# Patient Record
Sex: FEMALE | Race: BLACK OR AFRICAN AMERICAN
[De-identification: names, ages, dates, MRNs, and addresses within clinical notes are randomized per-mention and may not be internally consistent; named-entity substitution may affect disease eponyms.]

---

## 2021-02-25 ENCOUNTER — APPOINTMENT (OUTPATIENT)
Dept: NEUROLOGY | Facility: CLINIC | Age: 76
End: 2021-02-25

## 2023-01-11 ENCOUNTER — INPATIENT (INPATIENT)
Facility: HOSPITAL | Age: 78
LOS: 5 days | Discharge: NURSING, REHAB OR LONG-TERM CARE | DRG: 41 | End: 2023-01-17
Attending: PSYCHIATRY & NEUROLOGY | Admitting: PSYCHIATRY & NEUROLOGY
Payer: MEDICARE

## 2023-01-11 VITALS
OXYGEN SATURATION: 96 % | RESPIRATION RATE: 20 BRPM | WEIGHT: 134.04 LBS | SYSTOLIC BLOOD PRESSURE: 146 MMHG | TEMPERATURE: 99 F | HEIGHT: 61 IN | DIASTOLIC BLOOD PRESSURE: 84 MMHG | HEART RATE: 75 BPM

## 2023-01-11 DIAGNOSIS — I63.9 CEREBRAL INFARCTION, UNSPECIFIED: ICD-10-CM

## 2023-01-11 LAB
ALBUMIN SERPL ELPH-MCNC: 4 G/DL — SIGNIFICANT CHANGE UP (ref 3.3–5)
ALP SERPL-CCNC: 83 U/L — SIGNIFICANT CHANGE UP (ref 40–120)
ALT FLD-CCNC: 36 U/L — SIGNIFICANT CHANGE UP (ref 10–45)
ANION GAP SERPL CALC-SCNC: 12 MMOL/L — SIGNIFICANT CHANGE UP (ref 5–17)
APPEARANCE UR: CLEAR — SIGNIFICANT CHANGE UP
AST SERPL-CCNC: 30 U/L — SIGNIFICANT CHANGE UP (ref 10–40)
BASOPHILS # BLD AUTO: 0.03 K/UL — SIGNIFICANT CHANGE UP (ref 0–0.2)
BASOPHILS NFR BLD AUTO: 0.5 % — SIGNIFICANT CHANGE UP (ref 0–2)
BILIRUB SERPL-MCNC: 0.5 MG/DL — SIGNIFICANT CHANGE UP (ref 0.2–1.2)
BILIRUB UR-MCNC: NEGATIVE — SIGNIFICANT CHANGE UP
BUN SERPL-MCNC: 11 MG/DL — SIGNIFICANT CHANGE UP (ref 7–23)
CALCIUM SERPL-MCNC: 10 MG/DL — SIGNIFICANT CHANGE UP (ref 8.4–10.5)
CHLORIDE SERPL-SCNC: 99 MMOL/L — SIGNIFICANT CHANGE UP (ref 96–108)
CO2 SERPL-SCNC: 27 MMOL/L — SIGNIFICANT CHANGE UP (ref 22–31)
COLOR SPEC: SIGNIFICANT CHANGE UP
CREAT SERPL-MCNC: 1.03 MG/DL — SIGNIFICANT CHANGE UP (ref 0.5–1.3)
DIFF PNL FLD: NEGATIVE — SIGNIFICANT CHANGE UP
EGFR: 56 ML/MIN/1.73M2 — LOW
EOSINOPHIL # BLD AUTO: 0.07 K/UL — SIGNIFICANT CHANGE UP (ref 0–0.5)
EOSINOPHIL NFR BLD AUTO: 1.2 % — SIGNIFICANT CHANGE UP (ref 0–6)
FLUAV AG NPH QL: SIGNIFICANT CHANGE UP
FLUBV AG NPH QL: SIGNIFICANT CHANGE UP
GAS PNL BLDV: SIGNIFICANT CHANGE UP
GLUCOSE SERPL-MCNC: 108 MG/DL — HIGH (ref 70–99)
GLUCOSE UR QL: NEGATIVE — SIGNIFICANT CHANGE UP
HCT VFR BLD CALC: 45.6 % — HIGH (ref 34.5–45)
HGB BLD-MCNC: 14.7 G/DL — SIGNIFICANT CHANGE UP (ref 11.5–15.5)
IMM GRANULOCYTES NFR BLD AUTO: 0.2 % — SIGNIFICANT CHANGE UP (ref 0–0.9)
KETONES UR-MCNC: NEGATIVE — SIGNIFICANT CHANGE UP
LEUKOCYTE ESTERASE UR-ACNC: ABNORMAL
LYMPHOCYTES # BLD AUTO: 2.23 K/UL — SIGNIFICANT CHANGE UP (ref 1–3.3)
LYMPHOCYTES # BLD AUTO: 39.3 % — SIGNIFICANT CHANGE UP (ref 13–44)
MCHC RBC-ENTMCNC: 28.8 PG — SIGNIFICANT CHANGE UP (ref 27–34)
MCHC RBC-ENTMCNC: 32.2 GM/DL — SIGNIFICANT CHANGE UP (ref 32–36)
MCV RBC AUTO: 89.4 FL — SIGNIFICANT CHANGE UP (ref 80–100)
MONOCYTES # BLD AUTO: 0.52 K/UL — SIGNIFICANT CHANGE UP (ref 0–0.9)
MONOCYTES NFR BLD AUTO: 9.2 % — SIGNIFICANT CHANGE UP (ref 2–14)
NEUTROPHILS # BLD AUTO: 2.82 K/UL — SIGNIFICANT CHANGE UP (ref 1.8–7.4)
NEUTROPHILS NFR BLD AUTO: 49.6 % — SIGNIFICANT CHANGE UP (ref 43–77)
NITRITE UR-MCNC: NEGATIVE — SIGNIFICANT CHANGE UP
NRBC # BLD: 0 /100 WBCS — SIGNIFICANT CHANGE UP (ref 0–0)
PH UR: 6 — SIGNIFICANT CHANGE UP (ref 5–8)
PLATELET # BLD AUTO: 278 K/UL — SIGNIFICANT CHANGE UP (ref 150–400)
POTASSIUM SERPL-MCNC: 4.1 MMOL/L — SIGNIFICANT CHANGE UP (ref 3.5–5.3)
POTASSIUM SERPL-SCNC: 4.1 MMOL/L — SIGNIFICANT CHANGE UP (ref 3.5–5.3)
PROT SERPL-MCNC: 8.2 G/DL — SIGNIFICANT CHANGE UP (ref 6–8.3)
PROT UR-MCNC: SIGNIFICANT CHANGE UP
RBC # BLD: 5.1 M/UL — SIGNIFICANT CHANGE UP (ref 3.8–5.2)
RBC # FLD: 11.9 % — SIGNIFICANT CHANGE UP (ref 10.3–14.5)
RSV RNA NPH QL NAA+NON-PROBE: SIGNIFICANT CHANGE UP
SARS-COV-2 RNA SPEC QL NAA+PROBE: SIGNIFICANT CHANGE UP
SODIUM SERPL-SCNC: 138 MMOL/L — SIGNIFICANT CHANGE UP (ref 135–145)
SP GR SPEC: >1.05 (ref 1.01–1.02)
TROPONIN T, HIGH SENSITIVITY RESULT: 14 NG/L — SIGNIFICANT CHANGE UP (ref 0–51)
UROBILINOGEN FLD QL: NEGATIVE — SIGNIFICANT CHANGE UP
WBC # BLD: 5.68 K/UL — SIGNIFICANT CHANGE UP (ref 3.8–10.5)
WBC # FLD AUTO: 5.68 K/UL — SIGNIFICANT CHANGE UP (ref 3.8–10.5)

## 2023-01-11 PROCEDURE — 70498 CT ANGIOGRAPHY NECK: CPT | Mod: 26,MA

## 2023-01-11 PROCEDURE — 99285 EMERGENCY DEPT VISIT HI MDM: CPT | Mod: GC

## 2023-01-11 PROCEDURE — 71045 X-RAY EXAM CHEST 1 VIEW: CPT | Mod: 26

## 2023-01-11 PROCEDURE — 70496 CT ANGIOGRAPHY HEAD: CPT | Mod: 26,MA

## 2023-01-11 RX ORDER — ENOXAPARIN SODIUM 100 MG/ML
40 INJECTION SUBCUTANEOUS EVERY 24 HOURS
Refills: 0 | Status: DISCONTINUED | OUTPATIENT
Start: 2023-01-11 | End: 2023-01-15

## 2023-01-11 RX ORDER — CLOPIDOGREL BISULFATE 75 MG/1
75 TABLET, FILM COATED ORAL ONCE
Refills: 0 | Status: COMPLETED | OUTPATIENT
Start: 2023-01-12 | End: 2023-01-12

## 2023-01-11 RX ORDER — ASPIRIN/CALCIUM CARB/MAGNESIUM 324 MG
81 TABLET ORAL DAILY
Refills: 0 | Status: DISCONTINUED | OUTPATIENT
Start: 2023-01-11 | End: 2023-01-17

## 2023-01-11 RX ORDER — CLOPIDOGREL BISULFATE 75 MG/1
300 TABLET, FILM COATED ORAL ONCE
Refills: 0 | Status: COMPLETED | OUTPATIENT
Start: 2023-01-11 | End: 2023-01-11

## 2023-01-11 RX ORDER — ATORVASTATIN CALCIUM 80 MG/1
80 TABLET, FILM COATED ORAL AT BEDTIME
Refills: 0 | Status: DISCONTINUED | OUTPATIENT
Start: 2023-01-11 | End: 2023-01-17

## 2023-01-11 NOTE — ED PROVIDER NOTE - NS ED ROS FT
CONSTITUTIONAL: + generalized weakness  EYES/ENT: No visual changes;  No vertigo or throat pain   NECK: No pain or stiffness  RESPIRATORY: No cough, wheezing, hemoptysis; No shortness of breath  CARDIOVASCULAR: No chest pain or palpitations  GASTROINTESTINAL: No abdominal or epigastric pain. No nausea, vomiting, or hematemesis; No diarrhea or constipation. No melena or hematochezia.  GENITOURINARY: No dysuria, frequency or hematuria  NEUROLOGICAL: + confusion  SKIN: No itching, burning, rashes, or lesions   All other review of systems is negative unless indicated above.    ROS obtained from daughter secondary to patient's clinical status.

## 2023-01-11 NOTE — ED PROVIDER NOTE - PROGRESS NOTE DETAILS
DO Tiana: patient re-assessed and neuro exam remains the same.  results of the CTs discussed with the patient, patient's granddaughter, radiologist, and neurologist.  neurology team to assess the patient at bedside.

## 2023-01-11 NOTE — H&P ADULT - ASSESSMENT
Patient BRANDON BUCK is a 77y (15-Mj-1945) woman with HTN presenting with expressive aphasia since 1/8. Daughter at bedside reports finding the patient in her bedroom. She was trying to tell her daughter that she had trouble speaking. She new what she wanted to say,  but cound't find the words to express herself. The daughter reports she had a similar episode in the past and was found to have a UTI. She was treated with antibiotics and symptoms resolved. She took her to urgent care since she thought this was a UTI and they prescribed her antibiotics. Patient did not improve with antibiotics, which prompted them to come to the ED. Patient is noncompliant with blood pressure medication. Denies weakness, numbness, headache. At baseline, patient occasionally requires assistance to ambulate, but refuses to use walker. She requires some assistance with ADLs. On exam, patient has expressive aphasia, otherwise nonfocal. CTH left parietal brain acute infarct. and CTA stenosis versus occlusion involving a L distal M2.     Impression: Expressive > Receptive aphasia due to L distal M2 occlusion vs stenosis. Mechanism Intracranial ALEX    Plan:    Imaging/Labs  [] MRI brain w/o contrast to look at the extent and distribution of the stroke  [] MR NOVA  [] TTE  [] HbA1C and Lipid Panel.    Meds  Treat Per [facility]  [] Plavix load 300 mg followed by 75 mg for 3 months  [] Aspirin 81MG PO daily (ASA 300MG SD daily if patient fails dysphagia screen)  [] Atorvastatin 80MG QHS, titrate to LDL<70  [] DVT prophylaxis - Lovenox 40MG SC daily    Other  [] Telemonitoring; Neurochecks and vital signs per unit protocol, Q2H  [] Permissive HTN up to 220/120 mmHg for first 24 hours after symptom onset followed by gradual normotension.   [] BG goal <180, avoid hypoglycemia  [] NPO until clears dysphagia screen, otherwise swallow evaluation  [] Fall, aspiration precautions  [] PT/OT eval  [] Stroke education provided     Case discussed with Dr. Ann stroke fellow undersupervision of Dr. Kapoor Attending.

## 2023-01-11 NOTE — ED PROVIDER NOTE - OBJECTIVE STATEMENT
No difficulties Patient is a 77 year old female with past medical history significant for HTN presents to the Emergency Department with chief complaint of confusion.  Daughter states the patient lives with her and is usually AAOx3 and can complete all ADLs without assistance.  Patient developed word-finding difficulty and aphasia 5 days prior to presentation.  Daughter states symptoms developed into a generalized lethargy, which prompted visit to an urgent care 2 days prior and patient was placed on Macrobid for presumed urinary tract infection.  Daughter states symptoms have continued to progress and patient is now AAOx0 and is weak.  Daughter denies fever, vomiting, cough, or other physical complaints.  No sick contacts or recent travel.

## 2023-01-11 NOTE — ED PROVIDER NOTE - RAPID ASSESSMENT
3 days confusion / had no uti but did start abx 2 days    ** due to hospital overcrowding, pt seen in waiting room for MD triage by TUNDE navarro - comprehensive history and physical is not performed by me due to time constraints and patient was fully dressed due to  space constraints - patient to be sent to main ED for full history / physcial and medical evaluation - all orders placed by me to be followed by MD in main ED**

## 2023-01-11 NOTE — ED PROVIDER NOTE - PHYSICAL EXAMINATION
PHYSICAL EXAM:  GENERAL: NAD, lying in bed comfortably  HEAD:  Atraumatic, Normocephalic  EYES: EOMI, PERRLA, conjunctiva and sclera clear  ENT: No erythema/pallor/petechiae/lesions; TMs clear b/l  NECK: Supple, No JVD  LUNG: CTA b/l; no r/r/w  HEART: RRR, +S1/S2; No m/r/g  ABDOMEN: soft, NT/ND; BS audible   EXTREMITIES:  2+ Peripheral Pulses, brisk cap refill. No clubbing, cyanosis, or edema  NERVOUS SYSTEM:  AAOx0, speech mumbled. No sensory/motor deficits; cranial nerves 2-12 grossly intact bilaterally, negative pronator drift, cerebellar signs intact - finger-to-nose, no meningismus    MSK: FROM all 4 extremities, full and equal strength  SKIN: No rashes or lesions PHYSICAL EXAM:  GENERAL: NAD, lying in bed comfortably  HEAD:  Atraumatic, Normocephalic  EYES: EOMI, PERRLA, conjunctiva and sclera clear  ENT: No erythema/pallor/petechiae/lesions; TMs clear b/l  NECK: Supple, No JVD  LUNG: CTA b/l; no r/r/w  HEART: RRR, +S1/S2; No m/r/g  ABDOMEN: soft, NT/ND; BS audible   EXTREMITIES:  2+ Peripheral Pulses, brisk cap refill. No clubbing, cyanosis, or edema  NERVOUS SYSTEM:  AAOx0, speech mumbled; + aphasia; + word finding difficulty; No sensory deficits; gait exam deferred secondary to unsteady gait; cranial nerves 2-12 grossly intact bilaterally, negative pronator drift, cerebellar signs intact - finger-to-nose, no meningismus    MSK: FROM all 4 extremities, full and equal strength  SKIN: No rashes or lesions

## 2023-01-11 NOTE — ED ADULT NURSE NOTE - OBJECTIVE STATEMENT
76 y/o F PMHx HTN c/c AMS that started over the weekend as per daughter and got progressively worse. Pt went to  and was prescribed antibiotics for suspected UTI. Daughter states the patient lives with her and is usually AAOx3 and can complete all ADLs without assistance.  Patient developed word-finding difficulty and aphasia 5 days prior to presentation.  Daughter reported generalized lethargy, and continued to progress and patient is now AAOx0 and is weak. Placed on cardiac monitor.

## 2023-01-11 NOTE — H&P ADULT - HISTORY OF PRESENT ILLNESS
HPI: Patient BRANDON BUCK is a 77y (15-Mj-1945) woman with HTN presenting with       Review of Systems:    All other review of systems is negative unless indicated above.    Allergies:      PMHx/PSHx/Family Hx: As above, otherwise see below       Social Hx:  No current use of tobacco, alcohol, or illicit drugs  Lives with ***    Medications:  MEDICATIONS  (STANDING):    MEDICATIONS  (PRN):      Vitals:  T(C): 36.9 (01-11-23 @ 16:37), Max: 37.2 (01-11-23 @ 14:09)  HR: 82 (01-11-23 @ 16:37) (75 - 86)  BP: 138/72 (01-11-23 @ 16:37) (138/72 - 184/92)  RR: 17 (01-11-23 @ 16:37) (17 - 20)  SpO2: 94% (01-11-23 @ 16:37) (94% - 98%)    Physical Examination: INCOMPLETE  General - NAD  Cardiovascular - Peripheral pulses palpable, no edema  Eyes - Fundoscopy with flat, sharp optic discs and no hemorrhage or exudates; Fundoscopy not well visualized; Fundoscopy not performed due to safety precautions in the setting of the COVID-19 pandemic    Neurologic Exam:  Mental status - Awake, Alert, Oriented to person, place, and time. Speech fluent, repetition and naming intact. Follows simple and complex commands. Attention/concentration, recent and remote memory (including registration and recall), and fund of knowledge intact    Cranial nerves - PERRLA, VFF, EOMI, face sensation (V1-V3) intact b/l, facial strength intact without asymmetry b/l, hearing intact b/l, palate with symmetric elevation, trapezius OR sternocleidomastiod 5/5 strength b/l, tongue midline on protrusion with full lateral movement    Motor - Normal bulk and tone throughout. No pronator drift.  Strength testing            Deltoid      Biceps      Triceps     Wrist Extension    Wrist Flexion     Interossei         R            5                 5               5                     5                              5                        5                 5  L             5                 5               5                     5                              5                        5                 5              Hip Flexion    Hip Extension    Knee Flexion    Knee Extension    Dorsiflexion    Plantar Flexion  R              5                           5                       5                           5                            5                          5  L              5                           5                        5                           5                            5                          5    Sensation - Light touch/temperature OR pain/vibration intact throughout    DTR's -             Biceps      Triceps     Brachioradialis      Patellar    Ankle    Toes/plantar response  R             2+             2+                  2+                       2+            2+                 Down  L              2+             2+                 2+                        2+           2+                 Down    Coordination - Finger to Nose intact b/l. No tremors appreciated    Gait and station - Normal casual gait. Romberg (-)    Labs:                        14.7   5.68  )-----------( 278      ( 11 Jan 2023 14:36 )             45.6     01-11    138  |  99  |  11  ----------------------------<  108<H>  4.1   |  27  |  1.03    Ca    10.0      11 Jan 2023 14:36    TPro  8.2  /  Alb  4.0  /  TBili  0.5  /  DBili  x   /  AST  30  /  ALT  36  /  AlkPhos  83  01-11    CAPILLARY BLOOD GLUCOSE        LIVER FUNCTIONS - ( 11 Jan 2023 14:36 )  Alb: 4.0 g/dL / Pro: 8.2 g/dL / ALK PHOS: 83 U/L / ALT: 36 U/L / AST: 30 U/L / GGT: x               CSF:                  Radiology:  CT Head No Cont:  (11 Jan 2023 16:46)      Patient BRANDON BUCK is a 77y (15-Mj-1945) woman with HTN presenting with expressive aphasia since 1/8. Daughter at bedside reports finding the patient in her bedroom. She was trying to tell her daughter that she had trouble speaking. She new what she wanted to say,  but cound't find the words to express herself. The daughter reports she had a similar episode in the past and was found to have a UTI. She was treated with antibiotics and symptoms resolved. She took her to urgent care since she thought this was a UTI and they prescribed her antibiotics. Patient did not improve with antibiotics, which prompted them to come to the ED. Patient is noncompliant with blood pressure medication. Denies weakness, numbness, headache. At baseline, patient occasionally requires assistance to ambulate, but refuses to use walker. She requires some assistance with ADLs.     NIHSS 4  mRS 2    Review of Systems:    All other review of systems is negative unless indicated above.    Allergies:      PMHx/PSHx/Family Hx: As above, otherwise see below       Social Hx:  No current use of tobacco, alcohol, or illicit drugs      Medications:  MEDICATIONS  (STANDING):    MEDICATIONS  (PRN):      Vitals:  T(C): 36.9 (01-11-23 @ 16:37), Max: 37.2 (01-11-23 @ 14:09)  HR: 82 (01-11-23 @ 16:37) (75 - 86)  BP: 138/72 (01-11-23 @ 16:37) (138/72 - 184/92)  RR: 17 (01-11-23 @ 16:37) (17 - 20)  SpO2: 94% (01-11-23 @ 16:37) (94% - 98%)    Physical Examination:   General - NAD    Neurologic Exam:  Mental status - Awake, Alert, Oriented to person, place, and time. Speech fluent, repetition and naming intact. Follows simple and complex commands. Attention/concentration, recent and remote memory (including registration and recall), and fund of knowledge intact    Cranial nerves - PERRLA, VFF, EOMI, face sensation (V1-V3) intact b/l, facial strength intact without asymmetry b/l, hearing intact b/l, palate with symmetric elevation, trapezius OR sternocleidomastiod 5/5 strength b/l, tongue midline on protrusion with full lateral movement    Motor - Normal bulk and tone throughout. No pronator drift.  Strength testing            Deltoid      Biceps      Triceps     Wrist Extension    Wrist Flexion     Interossei         R            5                 5               5                     5                              5                        5                 5  L             5                 5               5                     5                              5                        5                 5              Hip Flexion    Hip Extension    Knee Flexion    Knee Extension    Dorsiflexion    Plantar Flexion  R              5                           5                       5                           5                            5                          5  L              5                           5                        5                           5                            5                          5    Sensation - Light touch/temperature OR pain/vibration intact throughout    DTR's -             Biceps      Triceps     Brachioradialis      Patellar    Ankle    Toes/plantar response  R             2+             2+                  2+                       2+            2+                 Down  L              2+             2+                 2+                        2+           2+                 Down    Coordination - Finger to Nose intact b/l. No tremors appreciated    Gait and station - Normal casual gait. Romberg (-)    Labs:                        14.7   5.68  )-----------( 278      ( 11 Jan 2023 14:36 )             45.6     01-11    138  |  99  |  11  ----------------------------<  108<H>  4.1   |  27  |  1.03    Ca    10.0      11 Jan 2023 14:36    TPro  8.2  /  Alb  4.0  /  TBili  0.5  /  DBili  x   /  AST  30  /  ALT  36  /  AlkPhos  83  01-11    CAPILLARY BLOOD GLUCOSE        LIVER FUNCTIONS - ( 11 Jan 2023 14:36 )  Alb: 4.0 g/dL / Pro: 8.2 g/dL / ALK PHOS: 83 U/L / ALT: 36 U/L / AST: 30 U/L / GGT: x               CSF:                  Radiology:  CT Head No Cont:  (11 Jan 2023 16:46)      Patient BRANDON BUCK is a 77y (15-Mj-1945) woman with HTN presenting with expressive aphasia since 1/8. Daughter at bedside reports finding the patient in her bedroom. She was trying to tell her daughter that she had trouble speaking. She new what she wanted to say,  but cound't find the words to express herself. The daughter reports she had a similar episode in the past and was found to have a UTI. She was treated with antibiotics and symptoms resolved. She took her to urgent care since she thought this was a UTI and they prescribed her antibiotics. Patient did not improve with antibiotics, which prompted them to come to the ED. Patient is noncompliant with blood pressure medication. Denies weakness, numbness, headache. At baseline, patient occasionally requires assistance to ambulate, but refuses to use walker. She requires some assistance with ADLs.     NIHSS 4  mRS 2

## 2023-01-11 NOTE — ED ADULT NURSE REASSESSMENT NOTE - NS ED NURSE REASSESS COMMENT FT1
Received patient from Vilma JULIAN, patient at AxOx0 mental status, able to make needs known, NAD, VSS, patient agreeable to plan of care, pending dispo. Pt knows first name, not sure of last name. Pt able to follow commands and move all 4 extremities independently. Pt able to ambulate with little assistance.

## 2023-01-11 NOTE — H&P ADULT - NSHPLABSRESULTS_GEN_ALL_CORE
Labs:                        14.7   5.68  )-----------( 278      ( 11 Jan 2023 14:36 )             45.6     01-11    138  |  99  |  11  ----------------------------<  108<H>  4.1   |  27  |  1.03    Ca    10.0      11 Jan 2023 14:36    TPro  8.2  /  Alb  4.0  /  TBili  0.5  /  DBili  x   /  AST  30  /  ALT  36  /  AlkPhos  83  01-11    CAPILLARY BLOOD GLUCOSE        LIVER FUNCTIONS - ( 11 Jan 2023 14:36 )  Alb: 4.0 g/dL / Pro: 8.2 g/dL / ALK PHOS: 83 U/L / ALT: 36 U/L / AST: 30 U/L / GGT: x                 < from: CT Angio Neck w/ IV Cont (01.11.23 @ 16:47) >    CT BRAIN WITHOUT CONTRAST:  1. Subtle decreased attenuation involving the left parietal brain   parenchyma which may represent an acute to subacute ischemic event. No   acute intracranial hemorrhage  2. Wedge-shaped defect in a right frontal location, compatible with an   old infarct.      CTA COW:  1. Hypoplastic right A1 segment. Bilateral A2 segment appear to receive   vascular flow from the left A1 segment.  2. Short segment high-grade stenosis of the distal aspect of the   intracranial right vertebral artery.  3. Short segment stenosis within the midportion of the basilar artery.  4. Fetal origins of the bilateral posterior cerebral arteries.  5. Question stenosis versus occlusion involving a distal M2 branch of the   left middle cerebral artery.    CTA NECK: Patent bilateral common carotid arteries and bilateral interanl   carotid arteries. No hemodynamically significant stenosis at the  ICA   origins based on NASCET criteria.  Bilateral vertebral arteries are patent without flow limiting stenosis.   Mild dominance of the left vertebral artery.

## 2023-01-11 NOTE — ED ADULT TRIAGE NOTE - ADDITIONAL SAFETY/BANDS...
Telephone Encounter by Jennifer Mcfarland RN at 01/20/17 04:29 PM     Author:  Jennifer Mcfarland RN Service:  (none) Author Type:  Registered Nurse     Filed:  01/20/17 04:30 PM Encounter Date:  1/19/2017 Status:  Signed     :  Jennifer Mcfarland RN (Registered Nurse)            Please advise on UTI screen, culture pending.[AH1.1M]       Revision History        User Key Date/Time User Provider Type Action    > AH1.1 01/20/17 04:30 PM Jennifer Mcfarland RN Registered Nurse Sign    M - Manual             Additional Safety/Bands:

## 2023-01-11 NOTE — H&P ADULT - NSHPPHYSICALEXAM_GEN_ALL_CORE
Physical Examination:   General - NAD    Neurologic Exam:  Mental status - Awake, Alert, Oriented to person, place, time. Speech dysfluent, mildly dysarthric and hypophonic, impaired ability to repeat and naming intact. Follows simple and complex commands. Attention/concentration, recent and remote memory (including registration and recall), and fund of knowledge intact    Cranial nerves - PERRL, BTT b/l, EOMI, face sensation (V1-V3) intact b/l, facial strength without asymmetry b/l, hearing intact b/l, palate with symmetric elevation, trapezius  5/5 strength b/l, tongue midline on protrusion with full lateral movement    Motor - Normal bulk and tone throughout. No pronator drift.  Strength testing  All extremities 5/5     Sensation - Light touch intact throughout    Coordination - Finger to Nose intact b/l. No tremors appreciated    Gait and station -Deferred due to fall safety risk

## 2023-01-11 NOTE — ED PROVIDER NOTE - CLINICAL SUMMARY MEDICAL DECISION MAKING FREE TEXT BOX
Patient is a 77 year old female with past medical history significant for HTN presents to the Emergency Department with chief complaint of confusion.  Patient evaluated for neurological and metabolic etiology of symptoms.  Patient received EKG, plain films, CT scans, and labs. Patient is a 77 year old female with past medical history significant for HTN presents to the Emergency Department with chief complaint of confusion.  Patient evaluated for neurological and metabolic etiology of symptoms.  Patient received EKG, plain films, CT scans, and labs.  CT significant for acute vs. subacute stroke.  Results discussed with radiology, then neurology immediately consulted.  Results discussed with patient and patient's family.  All questions answered and need for admission discussed.

## 2023-01-11 NOTE — ED PROVIDER NOTE - ATTENDING CONTRIBUTION TO CARE
attending Mackenzie: 77yF h/o HTN p/w confusion. As per daughter at bedside, pt is baseline A&Ox3 and can complete all ADLs without assistance.  Now with 5 days of word-finding difficulty and aphasia assoc with generalized weakness. Seen at urgent care 2 days prior and patient was placed on Macrobid for presumed urinary tract infection.  Now with persistent symptoms. No reported trauma. Examination as above. Concern for stroke vs infectious/metabolic process. Will obtain labs, EKG, place on tele, labs, UA/Ucx, CTH and CTA head/neck, anticipate admission

## 2023-01-12 ENCOUNTER — APPOINTMENT (OUTPATIENT)
Dept: INTERNAL MEDICINE | Facility: CLINIC | Age: 78
End: 2023-01-12

## 2023-01-12 DIAGNOSIS — I10 ESSENTIAL (PRIMARY) HYPERTENSION: ICD-10-CM

## 2023-01-12 DIAGNOSIS — I63.9 CEREBRAL INFARCTION, UNSPECIFIED: ICD-10-CM

## 2023-01-12 DIAGNOSIS — E78.5 HYPERLIPIDEMIA, UNSPECIFIED: ICD-10-CM

## 2023-01-12 DIAGNOSIS — R00.2 PALPITATIONS: ICD-10-CM

## 2023-01-12 LAB
A1C WITH ESTIMATED AVERAGE GLUCOSE RESULT: 5.6 % — SIGNIFICANT CHANGE UP (ref 4–5.6)
ANION GAP SERPL CALC-SCNC: 13 MMOL/L — SIGNIFICANT CHANGE UP (ref 5–17)
BUN SERPL-MCNC: 12 MG/DL — SIGNIFICANT CHANGE UP (ref 7–23)
CALCIUM SERPL-MCNC: 9.8 MG/DL — SIGNIFICANT CHANGE UP (ref 8.4–10.5)
CHLORIDE SERPL-SCNC: 99 MMOL/L — SIGNIFICANT CHANGE UP (ref 96–108)
CHOLEST SERPL-MCNC: 184 MG/DL — SIGNIFICANT CHANGE UP
CO2 SERPL-SCNC: 24 MMOL/L — SIGNIFICANT CHANGE UP (ref 22–31)
CREAT SERPL-MCNC: 1.05 MG/DL — SIGNIFICANT CHANGE UP (ref 0.5–1.3)
EGFR: 55 ML/MIN/1.73M2 — LOW
ESTIMATED AVERAGE GLUCOSE: 114 MG/DL — SIGNIFICANT CHANGE UP (ref 68–114)
GLUCOSE SERPL-MCNC: 113 MG/DL — HIGH (ref 70–99)
HCT VFR BLD CALC: 44.8 % — SIGNIFICANT CHANGE UP (ref 34.5–45)
HCV AB S/CO SERPL IA: 0.09 S/CO — SIGNIFICANT CHANGE UP (ref 0–0.99)
HCV AB SERPL-IMP: SIGNIFICANT CHANGE UP
HDLC SERPL-MCNC: 36 MG/DL — LOW
HGB BLD-MCNC: 14.7 G/DL — SIGNIFICANT CHANGE UP (ref 11.5–15.5)
LIPID PNL WITH DIRECT LDL SERPL: 133 MG/DL — HIGH
MAGNESIUM SERPL-MCNC: 2.2 MG/DL — SIGNIFICANT CHANGE UP (ref 1.6–2.6)
MCHC RBC-ENTMCNC: 29.1 PG — SIGNIFICANT CHANGE UP (ref 27–34)
MCHC RBC-ENTMCNC: 32.8 GM/DL — SIGNIFICANT CHANGE UP (ref 32–36)
MCV RBC AUTO: 88.7 FL — SIGNIFICANT CHANGE UP (ref 80–100)
NON HDL CHOLESTEROL: 149 MG/DL — HIGH
NRBC # BLD: 0 /100 WBCS — SIGNIFICANT CHANGE UP (ref 0–0)
PHOSPHATE SERPL-MCNC: 4.6 MG/DL — HIGH (ref 2.5–4.5)
PLATELET # BLD AUTO: 267 K/UL — SIGNIFICANT CHANGE UP (ref 150–400)
POTASSIUM SERPL-MCNC: 4.1 MMOL/L — SIGNIFICANT CHANGE UP (ref 3.5–5.3)
POTASSIUM SERPL-SCNC: 4.1 MMOL/L — SIGNIFICANT CHANGE UP (ref 3.5–5.3)
RBC # BLD: 5.05 M/UL — SIGNIFICANT CHANGE UP (ref 3.8–5.2)
RBC # FLD: 12.1 % — SIGNIFICANT CHANGE UP (ref 10.3–14.5)
SODIUM SERPL-SCNC: 136 MMOL/L — SIGNIFICANT CHANGE UP (ref 135–145)
TRIGL SERPL-MCNC: 78 MG/DL — SIGNIFICANT CHANGE UP
WBC # BLD: 5.44 K/UL — SIGNIFICANT CHANGE UP (ref 3.8–10.5)
WBC # FLD AUTO: 5.44 K/UL — SIGNIFICANT CHANGE UP (ref 3.8–10.5)

## 2023-01-12 PROCEDURE — 70551 MRI BRAIN STEM W/O DYE: CPT | Mod: 26

## 2023-01-12 PROCEDURE — 93306 TTE W/DOPPLER COMPLETE: CPT | Mod: 26

## 2023-01-12 PROCEDURE — 99223 1ST HOSP IP/OBS HIGH 75: CPT

## 2023-01-12 PROCEDURE — 70544 MR ANGIOGRAPHY HEAD W/O DYE: CPT | Mod: 26,59

## 2023-01-12 RX ORDER — SODIUM CHLORIDE 9 MG/ML
1000 INJECTION INTRAMUSCULAR; INTRAVENOUS; SUBCUTANEOUS
Refills: 0 | Status: DISCONTINUED | OUTPATIENT
Start: 2023-01-12 | End: 2023-01-12

## 2023-01-12 RX ORDER — POLYETHYLENE GLYCOL 3350 17 G/17G
17 POWDER, FOR SOLUTION ORAL DAILY
Refills: 0 | Status: DISCONTINUED | OUTPATIENT
Start: 2023-01-12 | End: 2023-01-17

## 2023-01-12 RX ORDER — SENNA PLUS 8.6 MG/1
2 TABLET ORAL AT BEDTIME
Refills: 0 | Status: DISCONTINUED | OUTPATIENT
Start: 2023-01-12 | End: 2023-01-17

## 2023-01-12 RX ADMIN — POLYETHYLENE GLYCOL 3350 17 GRAM(S): 17 POWDER, FOR SOLUTION ORAL at 21:38

## 2023-01-12 RX ADMIN — ENOXAPARIN SODIUM 40 MILLIGRAM(S): 100 INJECTION SUBCUTANEOUS at 13:06

## 2023-01-12 RX ADMIN — ATORVASTATIN CALCIUM 80 MILLIGRAM(S): 80 TABLET, FILM COATED ORAL at 00:24

## 2023-01-12 RX ADMIN — Medication 81 MILLIGRAM(S): at 13:04

## 2023-01-12 RX ADMIN — ATORVASTATIN CALCIUM 80 MILLIGRAM(S): 80 TABLET, FILM COATED ORAL at 21:38

## 2023-01-12 RX ADMIN — SENNA PLUS 2 TABLET(S): 8.6 TABLET ORAL at 21:38

## 2023-01-12 RX ADMIN — CLOPIDOGREL BISULFATE 300 MILLIGRAM(S): 75 TABLET, FILM COATED ORAL at 00:24

## 2023-01-12 RX ADMIN — CLOPIDOGREL BISULFATE 75 MILLIGRAM(S): 75 TABLET, FILM COATED ORAL at 13:04

## 2023-01-12 RX ADMIN — Medication 5 MILLIGRAM(S): at 21:38

## 2023-01-12 NOTE — PATIENT PROFILE ADULT - FALL HARM RISK - HARM RISK INTERVENTIONS
Assistance with ambulation/Assistance OOB with selected safe patient handling equipment/Communicate Risk of Fall with Harm to all staff/Discuss with provider need for PT consult/Monitor gait and stability/Provide patient with walking aids - walker, cane, crutches/Reinforce activity limits and safety measures with patient and family/Sit up slowly, dangle for a short time, stand at bedside before walking/Tailored Fall Risk Interventions/Use of alarms - bed, chair and/or voice tab/Visual Cue: Yellow wristband and red socks/Bed in lowest position, wheels locked, appropriate side rails in place/Call bell, personal items and telephone in reach/Instruct patient to call for assistance before getting out of bed or chair/Non-slip footwear when patient is out of bed/[place] to call system/Physically safe environment - no spills, clutter or unnecessary equipment/Purposeful Proactive Rounding/Room/bathroom lighting operational, light cord in reach

## 2023-01-12 NOTE — SPEECH LANGUAGE PATHOLOGY EVALUATION - COMMENTS
Cardiology ENT Heme/Onc Gastroenterology Mental status - Awake, Alert, Oriented to person, place, time. Speech dysfluent, mildly dysarthric and hypophonic, impaired ability to repeat and naming intact. Follows simple and complex commands. Attention/concentration, recent and remote memory (including registration and recall), and fund of knowledge intact  On exam, patient has expressive aphasia, otherwise nonfocal. CTH left parietal brain acute infarct. and CTA stenosis versus occlusion involving a L distal M2.     Impression: Expressive > Receptive aphasia due to L distal M2 occlusion vs stenosis. Mechanism Intracranial ALEX    Swallow history: Pt passed dysphagia screen 1/11 @6656. Pt failed repeat screen on 1/12 @03:07 2/2 wet gurgly voice and/or cough Cognitive-linguistic skills difficult to assess i/s/o language deficits Did not assess

## 2023-01-12 NOTE — CONSULT NOTE ADULT - ASSESSMENT
77y (15-Mj-1945) woman with HTN presenting with expressive aphasia since 1/8. Daughter at bedside reports finding the patient in her bedroom. She was trying to tell her daughter that she had trouble speaking. She new what she wanted to say,  but cound't find the words to express herself. The daughter reports she had a similar episode in the past and was found to have a UTI. She was treated with antibiotics and symptoms resolved. She took her to urgent care since she thought this was a UTI and they prescribed her antibiotics. Patient did not improve with antibiotics, which prompted them to come to the ED. Patient is noncompliant with blood pressure medication. Denies weakness, numbness, headache. At baseline, patient occasionally requires assistance to ambulate, but refuses to use walker. She requires some assistance with ADLs. On exam, patient has expressive aphasia, otherwise nonfocal. CTH left parietal brain acute infarct. and CTA stenosis versus occlusion involving a L distal M2.     Impression: Expressive > Receptive aphasia due to L distal M2 occlusion vs stenosis

## 2023-01-12 NOTE — OCCUPATIONAL THERAPY INITIAL EVALUATION ADULT - LIVES WITH, PROFILE
Pt lives with family. 3 EMILY. to enter private house. Pt has no adaptive equipment. Pt has a walk-in shower.

## 2023-01-12 NOTE — SWALLOW BEDSIDE ASSESSMENT ADULT - SLP GENERAL OBSERVATIONS
Pt encountered in bed, awake/alert, on room air. +Nonfluent aphasia. Able to follow simple directives. Vocal quality WFL.

## 2023-01-12 NOTE — SWALLOW BEDSIDE ASSESSMENT ADULT - SLP PERTINENT HISTORY OF CURRENT PROBLEM
77y (15-Mj-1945) woman with HTN presenting with expressive aphasia since 1/8. Daughter at bedside reports finding the patient in her bedroom. She was trying to tell her daughter that she had trouble speaking. She new what she wanted to say,  but cound't find the words to express herself. The daughter reports she had a similar episode in the past and was found to have a UTI. She was treated with antibiotics and symptoms resolved. She took her to urgent care since she thought this was a UTI and they prescribed her antibiotics. Patient did not improve with antibiotics, which prompted them to come to the ED. Patient is noncompliant with blood pressure medication. Denies weakness, numbness, headache. At baseline, patient occasionally requires assistance to ambulate, but refuses to use walker. She requires some assistance with ADLs.

## 2023-01-12 NOTE — SPEECH LANGUAGE PATHOLOGY EVALUATION - SLP ORIENTATION
Oriented to self; grossly to place, though cannot name specific hospital; month/year when given choices. Also appears oriented to situation, when asked what brought her to the hospital pt states, "I wasn't saying anything" consistent with admittance story.

## 2023-01-12 NOTE — OCCUPATIONAL THERAPY INITIAL EVALUATION ADULT - PHYSICAL ASSIST/NONPHYSICAL ASSIST: SIT/STAND, REHAB EVAL
verbal cues/nonverbal cues (demo/gestures)/1 person assist Hand placement during transfer/verbal cues/nonverbal cues (demo/gestures)/1 person assist

## 2023-01-12 NOTE — SWALLOW BEDSIDE ASSESSMENT ADULT - SWALLOW EVAL: DIAGNOSIS
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 Pt presents with a fully functional oropharyngeal swallow, with adequate oral prep/ transfer and palpable pharyngeal swallow trigger which is judged to be timely. No overt signs of laryngeal penetration/aspiration observed. Pt denies difficulty chewing and/or swallowing.

## 2023-01-12 NOTE — OCCUPATIONAL THERAPY INITIAL EVALUATION ADULT - PHYSICAL ASSIST/NONPHYSICAL ASSIST:DRESS LOWER BODY, OT EVAL
supervision/verbal cues/nonverbal cues (demo/gestures)/1 person assist verbal cues/nonverbal cues (demo/gestures)/1 person assist

## 2023-01-12 NOTE — PHYSICAL THERAPY INITIAL EVALUATION ADULT - PLANNED THERAPY INTERVENTIONS, PT EVAL
GOALS: Pt will negotiate 6 steps with handrail & step to pattern with supervision in 4wks/bed mobility training/gait training/transfer training

## 2023-01-12 NOTE — OCCUPATIONAL THERAPY INITIAL EVALUATION ADULT - PERTINENT HX OF CURRENT PROBLEM, REHAB EVAL
Pt is a 77y woman with HTN presenting with expressive aphasia since 1/8. Daughter at bedside reports finding the patient in her bedroom. She was trying to tell her daughter that she had trouble speaking. She new what she wanted to say,  but couldn't find the words to express herself. The daughter reports she had a similar episode in the past and was found to have a UTI. She was treated with antibiotics and symptoms resolved. She took her to urgent care since she thought this was a UTI and they prescribed her antibiotics. Patient did not improve with antibiotics, which prompted them to come to the ED. Patient is noncompliant with blood pressure medication. Denies weakness, numbness, headache. At baseline, patient occasionally requires assistance to ambulate, but refuses to use walker. She requires some assistance with ADLs. Pt is a 77y woman with HTN presenting with expressive aphasia since 1/8. Daughter at bedside reports finding the patient in her bedroom. She was trying to tell her daughter that she had trouble speaking. She new what she wanted to say,  but couldn't find the words to express herself. The daughter reports she had a similar episode in the past and was found to have a UTI. She was treated with antibiotics and symptoms resolved. She took her to urgent care since she thought this was a UTI and they prescribed her antibiotics. Patient did not improve with antibiotics, which prompted them to come to the ED. Patient is noncompliant with blood pressure medication. Denies weakness, numbness, headache. At baseline, patient occasionally requires assistance to ambulate, but refuses to use walker. She requires some assistance with ADLs.    CT BRAIN WITHOUT CONTRAST:1. Subtle decreased attenuation involving the left parietal brain   parenchyma which may represent an acute to subacute ischemic event. No   acute intracranial hemorrhage 2. Wedge-shaped defect in a right frontal location, compatible with an   old infarct. CTA COW:1. Hypoplastic right A1 segment. Bilateral A2 segment appear to receive   vascular flow from the left A1 segment.2. Short segment high-grade stenosis of the distal aspect of the intracranial right vertebral artery.3. Short segment stenosis within the midportion of the basilar artery.4. Fetal origins of the bilateral posterior cerebral arteries.5. Question stenosis versus occlusion involving a distal M2 branch of the left middle cerebral artery.CTA NECK: Patent bilateral common carotid arteries and bilateral interanl carotid arteries. No hemodynamically significant stenosis at the  ICA origins based on NASCET criteria.Bilateral vertebral arteries are patent without flow limiting stenosis. Mild dominance of the left vertebral artery.

## 2023-01-12 NOTE — OCCUPATIONAL THERAPY INITIAL EVALUATION ADULT - NSOTDISCHREC_GEN_A_CORE
Acute Inpatient Rehab If pt goes home, OT rec home OT and assist with all ADLs and mobility/Acute Inpatient Rehab

## 2023-01-12 NOTE — OCCUPATIONAL THERAPY INITIAL EVALUATION ADULT - NS ASR FOLLOW COMMAND OT EVAL
75% of the time with increased time to respond and repetition of directions/75% of the time/able to follow single-step instructions

## 2023-01-12 NOTE — SWALLOW BEDSIDE ASSESSMENT ADULT - SWALLOW EVAL: REHAB POTENTIAL
INR at goal. Medications and chart reviewed. No changes noted to necessitate adjustment of warfarin or follow-up plan. See calendar.       
My chart message from 4/25 was not read by patient; called regarding missed 5/2 INR; patient states, he is currently out of town at the moment & did not test with meter. Also reports going to outpatient lab in [place]. He declined to give location information to request hard copy; states finding it very insulting if I do not take his word (INR 1.97) on 5/2.  
Note reviewed. Our clinic needs record of lab results to ensure we are dosing him appropriately. Chart routed to [facility] team.    5/6 - received no further communication regarding what lab he went to in [place] to request INR records. Will ask that he test with meter today.    
fair, will monitor progress closely

## 2023-01-12 NOTE — PHYSICAL THERAPY INITIAL EVALUATION ADULT - GAIT DEVIATIONS NOTED, PT EVAL
decreased harley/increased time in double stance/decreased velocity of limb motion/decreased step length/decreased stride length/decreased weight-shifting ability

## 2023-01-12 NOTE — PHYSICAL THERAPY INITIAL EVALUATION ADULT - PERTINENT HX OF CURRENT PROBLEM, REHAB EVAL
Pt is 77F admitted 1/11/23 PMHx HTN presenting with expressive aphasia since 1/8. Daughter at bedside reports finding the patient in her bedroom. She was trying to tell her daughter that she had trouble speaking. The daughter reports she had a similar episode in the past and was found to have a UTI. She was treated with antibiotics and symptoms resolved. She took her to urgent care since she thought this was a UTI and they prescribed her antibiotics. Patient did not improve with antibiotics, which prompted them to come to the ED. Patient is noncompliant with blood pressure medication.     CT BRAIN WITHOUT CONTRAST:1. Subtle decreased attenuation involving the left parietal brain parenchyma which may represent an acute to subacute ischemic event. No acute intracranial hemorrhage2. Wedge-shaped defect in a right frontal location, compatible with an old infarct.CTA COW:1. Hypoplastic right A1 segment. Bilateral A2 segment appear to receive vascular flow from the left A1 segment.2. Short segment high-grade stenosis of the distal aspect of theintracranial right vertebral artery.3. Short segment stenosis within the midportion of the basilar artery.4. Fetal origins of the bilateral posterior cerebral arteries.5. Question stenosis versus occlusion involving a distal M2 branch of the left middle cerebral artery.CTA NECK: Patent bilateral common carotid arteries and bilateral interanl carotid arteries. No hemodynamically significant stenosis at the  ICA origins based on NASCET criteria.Bilateral vertebral arteries are patent without flow limiting stenosis. Mild dominance of the left vertebral artery.If the patient has persistent symptoms, consideration could be given to brain MRI brain and/or MRA if clinically warranted and if there are no   MRI contraindications.

## 2023-01-12 NOTE — PHYSICAL THERAPY INITIAL EVALUATION ADULT - GENERAL OBSERVATIONS, REHAB EVAL
received semisupine in bed, A&OX3, following commands, pleasant & eager to participate, a/w left MCA, p/w expressive aphasia, word finding difficulties, requiring increased time for processing.

## 2023-01-12 NOTE — PATIENT PROFILE ADULT - FUNCTIONAL ASSESSMENT - DAILY ACTIVITY 1.
General surgeon at bedside assessing patient at this time. Wife remains at bedside. Patient alert and appropriate. Patient continues to wait for bed for admission.  Discussing need for NG tube at this time, patient verbalizing understanding of need for this 4 = No assist / stand by assistance

## 2023-01-12 NOTE — CONSULT NOTE ADULT - PROBLEM SELECTOR RECOMMENDATION 9
MRI brain w/o contrast to look at the extent and distribution of the stroke  DAPT as ordered for now   Check TTE and EKG   Monitor on Tele   PT eval   Orders per Stroke team

## 2023-01-12 NOTE — PHYSICAL THERAPY INITIAL EVALUATION ADULT - GAIT PATTERN USED, PT EVAL
Subjective:     Patient ID: Martha Hinson is a 64 y o  female  [facility] Progress Notes      Specialized Services Documentation  Therapist must complete separate progress note for each specific clinical activity in which the individual participated during the day  Group Psychotherapy ([id number])   This group was facilitated virtually in a private office using [facility] and [facility] Teams  Martha Hinson consented to the use of tele-video modality of treatment  Martha Hinson actively participated in psychoeducation group this morning which focused on part two of sleep hygiene  Group shared their quality and quantity of sleep last night and discussed current barriers contributing to decreased quality of sleep  This writer shared various statistics pertaining to sleep and sleep disorders  A step by step nightmare protocol was reviewed and patients were encouraged to begin keeping a sleep diary  Martha plans to continue taking her bedtime medications  in effort to maintain her quality of sleep  Good progress toward goal observed  Continue psychoeducation group to increase awareness of good sleeping habits to promote wellness         Tx Plan Objective: 1 3 Therapist:  Michael Johnson RN swing-to gait

## 2023-01-12 NOTE — SWALLOW BEDSIDE ASSESSMENT ADULT - ASPIRATION PRECAUTIONS
Monitor for s/s aspiration/laryngeal penetration. If noted:  D/C p.o. intake, provide non-oral nutrition/hydration/meds, and contact this service @ [phone]

## 2023-01-12 NOTE — OCCUPATIONAL THERAPY INITIAL EVALUATION ADULT - GENERAL OBSERVATIONS, REHAB EVAL
Pt received laying in chair, +IV line, +tele, +pulse ox Pt received sitting in chair, +IV line, +tele, +chair alarm

## 2023-01-12 NOTE — SPEECH LANGUAGE PATHOLOGY EVALUATION - SLP DIAGNOSIS
Pt presents with a nonfluent aphasia, with expressive deficits > receptive deficits. Pt presents with a nonfluent aphasia, with expressive deficits > receptive deficits. Pt able to follow 1-step directives, identify pictures in a field of 6, and answer yes/no questions pertaining to personal/ biographical information; increased difficulty seen with more complex directives and less salient yes/no questions. Expressive output limited to simple phrases/ sentences, with word-finding/ naming difficulties. Occasional perseverations noted. Repetition is intact.

## 2023-01-12 NOTE — SWALLOW BEDSIDE ASSESSMENT ADULT - COMMENTS
Mental status - Awake, Alert, Oriented to person, place, time. Speech dysfluent, mildly dysarthric and hypophonic, impaired ability to repeat and naming intact. Follows simple and complex commands. Attention/concentration, recent and remote memory (including registration and recall), and fund of knowledge intact  On exam, patient has expressive aphasia, otherwise nonfocal. CTH left parietal brain acute infarct. and CTA stenosis versus occlusion involving a L distal M2.     Impression: Expressive > Receptive aphasia due to L distal M2 occlusion vs stenosis. Mechanism Intracranial ALEX    Swallow history: Pt passed dysphagia screen 1/11 @1106. Pt failed repeat screen on 1/12 @03:07 2/2 wet gurgly voice and/or cough

## 2023-01-13 PROCEDURE — 99222 1ST HOSP IP/OBS MODERATE 55: CPT

## 2023-01-13 PROCEDURE — 99232 SBSQ HOSP IP/OBS MODERATE 35: CPT | Mod: FS

## 2023-01-13 RX ORDER — CLOPIDOGREL BISULFATE 75 MG/1
75 TABLET, FILM COATED ORAL DAILY
Refills: 0 | Status: DISCONTINUED | OUTPATIENT
Start: 2023-01-13 | End: 2023-01-15

## 2023-01-13 RX ADMIN — POLYETHYLENE GLYCOL 3350 17 GRAM(S): 17 POWDER, FOR SOLUTION ORAL at 13:54

## 2023-01-13 RX ADMIN — CLOPIDOGREL BISULFATE 75 MILLIGRAM(S): 75 TABLET, FILM COATED ORAL at 13:54

## 2023-01-13 RX ADMIN — Medication 81 MILLIGRAM(S): at 13:54

## 2023-01-13 RX ADMIN — ATORVASTATIN CALCIUM 80 MILLIGRAM(S): 80 TABLET, FILM COATED ORAL at 21:35

## 2023-01-13 RX ADMIN — ENOXAPARIN SODIUM 40 MILLIGRAM(S): 100 INJECTION SUBCUTANEOUS at 13:54

## 2023-01-13 NOTE — PROGRESS NOTE ADULT - SUBJECTIVE AND OBJECTIVE BOX
THE PATIENT WAS SEEN AND EXAMINED BY ME WITH THE HOUSESTAFF AND STROKE TEAM DURING MORNING ROUNDS.   HPI:   Patient BRANDON BUCK is a 77y (15-Mj-1945) woman with HTN presenting with expressive aphasia since 1/8. Daughter at bedside reports finding the patient in her bedroom. She was trying to tell her daughter that she had trouble speaking. She new what she wanted to say,  but cound't find the words to express herself. The daughter reports she had a similar episode in the past and was found to have a UTI. She was treated with antibiotics and symptoms resolved. She took her to urgent care since she thought this was a UTI and they prescribed her antibiotics. Patient did not improve with antibiotics, which prompted them to come to the ED. Patient is noncompliant with blood pressure medication. Denies weakness, numbness, headache. At baseline, patient occasionally requires assistance to ambulate, but refuses to use walker. She requires some assistance with ADLs.     NIHSS 4  mRS 2      SUBJECTIVE: No events overnight.  No new neurologic complaints.      aspirin enteric coated 81 milliGRAM(s) Oral daily  atorvastatin 80 milliGRAM(s) Oral at bedtime  bisacodyl 5 milliGRAM(s) Oral every 12 hours PRN  enoxaparin Injectable 40 milliGRAM(s) SubCutaneous every 24 hours  polyethylene glycol 3350 17 Gram(s) Oral daily  senna 2 Tablet(s) Oral at bedtime      PHYSICAL EXAM:   Vital Signs Last 24 Hrs  T(C): 36.8 (13 Jan 2023 04:52), Max: 37.1 (12 Jan 2023 13:18)  T(F): 98.2 (13 Jan 2023 04:52), Max: 98.8 (12 Jan 2023 13:18)  HR: 67 (13 Jan 2023 04:52) (67 - 84)  BP: 151/91 (13 Jan 2023 04:52) (108/73 - 160/71)  RR: 18 (13 Jan 2023 04:52) (18 - 18)  SpO2: 98% (13 Jan 2023 04:52) (94% - 98%)    Parameters below as of 13 Jan 2023 04:52  Patient On (Oxygen Delivery Method): nasal cannula        General: No acute distress  HEENT: EOM intact, visual fields full  Abdomen: Soft, nontender, nondistended   Extremities: No edema    NEUROLOGICAL EXAM:  Mental status: Awake, alert, oriented x3, expressive aphasia  Cranial Nerves: No facial asymmetry, no nystagmus, no dysarthria,  tongue midline  Motor exam: Normal tone, no drift, 5/5 RUE, 5/5 RLE, 5/5 LUE, 5/5 LLE, normal fine finger movements.  Sensation: Intact to light touch   Coordination/ Gait: No dysmetria, SALBADOR intact and symmetric bilaterally    LABS:                        14.7   5.44  )-----------( 267      ( 12 Jan 2023 05:39 )             44.8    01-12    136  |  99  |  12  ----------------------------<  113<H>  4.1   |  24  |  1.05    Ca    9.8      12 Jan 2023 05:38  Phos  4.6     01-12  Mg     2.2     01-12    TPro  8.2  /  Alb  4.0  /  TBili  0.5  /  DBili  x   /  AST  30  /  ALT  36  /  AlkPhos  83  01-11        IMAGING: Reviewed by me.   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   Significant stenosis of the basilar artery. The posterior communicating   arteries help supply the posterior circulation    01/11/23:  CT BRAIN WITHOUT CONTRAST:  1. Subtle decreased attenuation involving the left parietal brain   parenchyma which may represent an acute to subacute ischemic event. No   acute intracranial hemorrhage  2. Wedge-shaped defect in a right frontal location, compatible with an   old infarct.      CTA COW:  1. Hypoplastic right A1 segment. Bilateral A2 segment appear to receive   vascular flow from the left A1 segment.  2. Short segment high-grade stenosis of the distal aspect of the   intracranial right vertebral artery.  3. Short segment stenosis within the midportion of the basilar artery.  4. Fetal origins of the bilateral posterior cerebral arteries.  5. Question stenosis versus occlusion involving a distal M2 branch of the   left middle cerebral artery.    CTA NECK: Patent bilateral common carotid arteries and bilateral interanl   carotid arteries. No hemodynamically significant stenosis at the  ICA   origins based on NASCET criteria.  Bilateral vertebral arteries are patent without flow limiting stenosis.   Mild dominance of the left vertebral artery.   THE PATIENT WAS SEEN AND EXAMINED BY ME WITH THE HOUSESTAFF AND STROKE TEAM DURING MORNING ROUNDS.   HPI:  77y (15-Mj-1945) woman with HTN presenting with expressive aphasia since 1/8. Daughter at bedside reports finding the patient in her bedroom. She was trying to tell her daughter that she had trouble speaking. She new what she wanted to say,  but cound't find the words to express herself. The daughter reports she had a similar episode in the past and was found to have a UTI. She was treated with antibiotics and symptoms resolved. She took her to urgent care since she thought this was a UTI and they prescribed her antibiotics. Patient did not improve with antibiotics, which prompted them to come to the ED. Patient is noncompliant with blood pressure medication. Denies weakness, numbness, headache. At baseline, patient occasionally requires assistance to ambulate, but refuses to use walker. She requires some assistance with ADLs.     NIHSS 4  mRS 2    SUBJECTIVE: No events overnight.  No new neurologic complaints.      aspirin enteric coated 81 milliGRAM(s) Oral daily  atorvastatin 80 milliGRAM(s) Oral at bedtime  bisacodyl 5 milliGRAM(s) Oral every 12 hours PRN  enoxaparin Injectable 40 milliGRAM(s) SubCutaneous every 24 hours  polyethylene glycol 3350 17 Gram(s) Oral daily  senna 2 Tablet(s) Oral at bedtime    PHYSICAL EXAM:   Vital Signs Last 24 Hrs  T(C): 36.8 (13 Jan 2023 04:52), Max: 37.1 (12 Jan 2023 13:18)  T(F): 98.2 (13 Jan 2023 04:52), Max: 98.8 (12 Jan 2023 13:18)  HR: 67 (13 Jan 2023 04:52) (67 - 84)  BP: 151/91 (13 Jan 2023 04:52) (108/73 - 160/71)  RR: 18 (13 Jan 2023 04:52) (18 - 18)  SpO2: 98% (13 Jan 2023 04:52) (94% - 98%)    Parameters below as of 13 Jan 2023 04:52  Patient On (Oxygen Delivery Method): nasal cannula    General: No acute distress  HEENT: EOM intact, visual fields full  Abdomen: Soft, nontender, nondistended   Extremities: No edema    NEUROLOGICAL EXAM:  Mental status: Awake, alert, oriented x3, expressive aphasia  Cranial Nerves: No facial asymmetry, no nystagmus, no dysarthria,  tongue midline  Motor exam: Normal tone, no drift, 5/5 RUE, 5/5 RLE, 5/5 LUE, 5/5 LLE, normal fine finger movements.  Sensation: Intact to light touch   Coordination/ Gait: No dysmetria, SALBADOR intact and symmetric bilaterally    LABS:                        14.7   5.44  )-----------( 267      ( 12 Jan 2023 05:39 )             44.8    01-12    136  |  99  |  12  ----------------------------<  113<H>  4.1   |  24  |  1.05    Ca    9.8      12 Jan 2023 05:38  Phos  4.6     01-12  Mg     2.2     01-12    TPro  8.2  /  Alb  4.0  /  TBili  0.5  /  DBili  x   /  AST  30  /  ALT  36  /  AlkPhos  83  01-11      IMAGING: Reviewed by me.   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   Significant stenosis of the basilar artery. The posterior communicating   arteries help supply the posterior circulation    01/11/23:  CT BRAIN WITHOUT CONTRAST:  1. Subtle decreased attenuation involving the left parietal brain   parenchyma which may represent an acute to subacute ischemic event. No   acute intracranial hemorrhage  2. Wedge-shaped defect in a right frontal location, compatible with an   old infarct.    CTA COW:  1. Hypoplastic right A1 segment. Bilateral A2 segment appear to receive   vascular flow from the left A1 segment.  2. Short segment high-grade stenosis of the distal aspect of the   intracranial right vertebral artery.  3. Short segment stenosis within the midportion of the basilar artery.  4. Fetal origins of the bilateral posterior cerebral arteries.  5. Question stenosis versus occlusion involving a distal M2 branch of the   left middle cerebral artery.    CTA NECK: Patent bilateral common carotid arteries and bilateral internal   carotid arteries. No hemodynamically significant stenosis at the  ICA   origins based on NASCET criteria.  Bilateral vertebral arteries are patent without flow limiting stenosis.   Mild dominance of the left vertebral artery.

## 2023-01-13 NOTE — CONSULT NOTE ADULT - REASON FOR ADMISSION
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

## 2023-01-13 NOTE — CONSULT NOTE ADULT - SUBJECTIVE AND OBJECTIVE BOX
EP Attending  HISTORY OF PRESENT ILLNESS: HPI:   Patient BRANDON BUCK is a 77y (15-Mj-1945) woman with HTN presenting with expressive aphasia since 1/8. Daughter at bedside reports finding the patient in her bedroom. She was trying to tell her daughter that she had trouble speaking. She new what she wanted to say,  but cound't find the words to express herself. The daughter reports she had a similar episode in the past and was found to have a UTI. She was treated with antibiotics and symptoms resolved. She took her to urgent care since she thought this was a UTI and they prescribed her antibiotics. Patient did not improve with antibiotics, which prompted them to come to the ED. Patient is noncompliant with blood pressure medication. Denies weakness, numbness, headache. At baseline, patient occasionally requires assistance to ambulate, but refuses to use walker. She requires some assistance with ADLs.   NIHSS 4  mRS 2    Ms Buck is a very pleasant 76yo woman who presented late after stroke symptoms os expressive aphasia (can still nod her head yes/no, and indicate she understands what is going on).  Her daughter is present at bedside to assist with history-gathering.  Does not take her BP medication well at home, but otherwise has no prior history of MI, stroke, palpitations, fainting or lightheadedness.  A 10 pt ROS is otherwise negative.  She presented out of frustration that symptoms were not improving after being treated for other conditions.    EP called to discuss long term heart rhythm monitoring after stroke to look for atrial fibrillation.  Causes of embolic strokes, rationale for loop recorder monitoring, and risks + benefits discussed at length with family present both at bedside and by telephone.      PAST MEDICAL & SURGICAL HISTORY:  HTN uncontrolled      MEDICATIONS  (STANDING):  aspirin enteric coated 81 milliGRAM(s) Oral daily  atorvastatin 80 milliGRAM(s) Oral at bedtime  clopidogrel Tablet 75 milliGRAM(s) Oral daily  enoxaparin Injectable 40 milliGRAM(s) SubCutaneous every 24 hours  polyethylene glycol 3350 17 Gram(s) Oral daily  senna 2 Tablet(s) Oral at bedtime      Allergies    No Known Allergies    Intolerances    FAMILY HISTORY:    Non-contributary for premature coronary disease or sudden cardiac death    SOCIAL HISTORY:    [ x] Non-smoker  [ ] Smoker  [ ] Alcohol    PHYSICAL EXAM:  T(C): 36.8 (01-13-23 @ 13:25), Max: 37.1 (01-13-23 @ 00:43)  HR: 62 (01-13-23 @ 13:25) (62 - 84)  BP: 124/77 (01-13-23 @ 13:25) (124/77 - 160/71)  RR: 18 (01-13-23 @ 13:25) (18 - 18)  SpO2: 98% (01-13-23 @ 13:25) (95% - 99%)  Wt(kg): --    Appearance: Normal appearing adult woman in no acute distress 	  HEENT:   Normal oral mucosa, PERRL, EOMI	  Lymphatic: No lymphadenopathy , no edema  Cardiovascular: Normal S1 S2, No JVD, No murmurs , Peripheral pulses palpable 2+ bilaterally  Respiratory: Lungs clear to auscultation, normal effort 	  Gastrointestinal:  Soft, Non-tender, + BS	  Skin: No rashes, No ecchymoses, No cyanosis, warm to touch  Musculoskeletal: Normal range of motion, normal strength  Psychiatry:  Mood is "thumbs up" and affect seems flat.  Dense expressive aphasia, but able to motion with her hands and head that she understands our discussion.      TELEMETRY: NSR	    ECG: No AFib 	  Echo: < from: TTE with Doppler (w/Cont) (01.12.23 @ 07:43) >  Dimensions:    Normal Values:  LA:     3.0    2.0 - 4.0 cm  Ao:     2.9    2.0 - 3.8 cm  SEPTUM: 1.5    0.6 - 1.2 cm  PWT:    1.0    0.6 - 1.1 cm  LVIDd:  2.8    3.0 - 5.6 cm  LVIDs:  1.5    1.8 - 4.0 cm  Derived variables:  LVMI: 67 g/m2  RWT: 0.71  Fractional short: 46 %  EF (Joshi Rule): 70 %Doppler Peak Velocity (m/sec):  AoV=2.6  ------------------------------------------------------------------------  Observations:  Mitral Valve: Mitral annular calcification, otherwise  normal mitral valve. No mitral regurgitation seen.  Aortic Valve/Aorta: Calcified trileaflet aortic valve with  decreased opening. However, aortic valve is not well  visualized. Peak transaortic valve gradient equals 27 mm  Hg, mean transaortic valve gradient equals 16 mm Hg,  estimated aortic valve area equals 1.9 sqcm (by continuity  equation), aortic valve velocity time integral equals 44  cm, consistent with mild aortic stenosis.  peak velocity: 2.6 m/s  peak gradient 27 mmHg  LVOT VTI: 24 cm  AV VTI: 44 cm  DVI: 0.4 No aortic valve regurgitation seen. Peak left  ventricular outflow tract gradient equals 5 mm Hg, mean  gradient is equal to 3 mm Hg, LVOT velocity time integral  equals 24 cm.  Aortic Root: 2.9 cm.  Ascending Aorta: 3 cm.  LVOT diameter: 1.9 cm.  Left Atrium: Normal left atrium.  LA volume index = 17  cc/m2.  Left Ventricle: Hyperdynamic left ventricular systolic  function. Endocardial visualization enhanced with  intravenous injection of Ultrasonic Enhancing Agent  (Definity). LVEF calculated using biplane Joshi's method  is 70%. No left ventricular thrombus. There is basal septal  hypertriphy. Normal diastolic function.  Right Heart: Normal right atrium. The right ventricle is  not well visualized; grossly normal right ventricular  systolic function. Normal tricuspid valve. No tricuspid  regurgitation. Normal pulmonic valve.  Pericardium/Pleura: No pericardial effusion seen.  Hemodynamic: IVC is not well visualized. Unable to estimate  RVSP.    < end of copied text >    < from: MR Head No Cont (01.12.23 @ 16:58) >  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   Significant stenosis of the basilar artery. The posterior communicating   arteries help supply the posterior circulation. Noninvasive flow MR   angiography as above.    < end of copied text >  	  	  LABS:	 	                          14.7   5.44  )-----------( 267      ( 12 Jan 2023 05:39 )             44.8     01-12    136  |  99  |  12  ----------------------------<  113<H>  4.1   |  24  |  1.05    Ca    9.8      12 Jan 2023 05:38  Phos  4.6     01-12  Mg     2.2     01-12    TPro  8.2  /  Alb  4.0  /  TBili  0.5  /  DBili  x   /  AST  30  /  ALT  36  /  AlkPhos  83  01-11      ASSESSMENT/PLAN:  Ms Buck is a very pleasant 77y Female here with late presentation of stroke.  Continue aspirin, clopidogrel, and atorvastatin for secondary stroke prevention.  She has a history of HTN, but not diabetes or hyperlipidemia.  She has MRI evidence of old strokes in another vascular territory.  Her echocardiogram is reassuring, normal EF, no patent foramen ovale.  Normal head and neck vasculature otherwise.  Neurology note suggests that her infarct may be embolic due to the size/location, and contributory arrhythmias should be ruled out.  She is referred for a loop recorder specifically for surveillance for paroxysmal atrial fibrillation after an embolic stroke of unknown source.  A repeat stroke from AFib has a high likelihood of being disabling or fatal, and identifying this arrhythmia would change medical management by allowing us to start oral anticoagulation (which she is willing to take).  There is less than 1% risk of infection for this bedside minor surgery.  She prefers this pathway over an event monitor, which would require daily wear for ~30 days at a time.  We will return to bedside later to proceed with implant, see separate operative report.  Afterwards OK for discharge immediately.  Wound check with Dr Kaur.    Phillip Rodriguez M.D.  Cardiac Electrophysiology    office [phone]  pager [phone]
HPI:   Patient BRANDON BUCK is a 77y (15-Mj-1945) woman with HTN presenting with expressive aphasia since 1/8. Daughter at bedside reports finding the patient in her bedroom. She was trying to tell her daughter that she had trouble speaking. She new what she wanted to say,  but cound't find the words to express herself. The daughter reports she had a similar episode in the past and was found to have a UTI. She was treated with antibiotics and symptoms resolved. She took her to urgent care since she thought this was a UTI and they prescribed her antibiotics. Patient did not improve with antibiotics, which prompted them to come to the ED. Patient is noncompliant with blood pressure medication. Denies weakness, numbness, headache. At baseline, patient occasionally requires assistance to ambulate, but refuses to use walker. She requires some assistance with ADLs.     Patient was admitted on 1/11, MRI with left parietal infarct, patient seen today, daughter at bedside.     REVIEW OF SYSTEMS  Constitutional - No fever, No weight loss, No fatigue  HEENT - No eye pain, No visual disturbances, No difficulty hearing, No tinnitus, No vertigo, No neck pain  Respiratory - No cough, No wheezing, No shortness of breath  Cardiovascular - No chest pain, No palpitations  Gastrointestinal - No abdominal pain, No nausea, No vomiting, No diarrhea, No constipation  Genitourinary - No dysuria, No frequency, No hematuria, No incontinence  Psychiatric - No depression, No anxiety    VITALS  T(C): 36.8 (01-13-23 @ 09:38), Max: 37.1 (01-12-23 @ 13:18)  HR: 76 (01-13-23 @ 09:38) (67 - 84)  BP: 134/77 (01-13-23 @ 09:38) (108/73 - 160/71)  RR: 18 (01-13-23 @ 09:38) (18 - 18)  SpO2: 99% (01-13-23 @ 09:38) (94% - 99%)  Wt(kg): --    PAST MEDICAL & SURGICAL HISTORY  see HPI     SOCIAL HISTORY  Smoking - Denied  EtOH - Denied   Drugs - Denied    FUNCTIONAL HISTORY  Lives with family, 2 steps to enter   Independent AMB and ADLs PTA     CURRENT FUNCTIONAL STATUS  1/12 SLP  non fluent aphasia, expressive >receptive   functional oropharyngeal swallow   regular/thin    1/12 PT  bed mobility CG/min assist  transfers min assist with RW  gait min assist x 20 feet with RW    1/12 OT  transfers min assist with RW     FAMILY HISTORY   no pertinent history in first degree relatives     RECENT LABS/IMAGING  CBC Full  -  ( 12 Jan 2023 05:39 )  WBC Count : 5.44 K/uL  RBC Count : 5.05 M/uL  Hemoglobin : 14.7 g/dL  Hematocrit : 44.8 %  Platelet Count - Automated : 267 K/uL  Mean Cell Volume : 88.7 fl  Mean Cell Hemoglobin : 29.1 pg  Mean Cell Hemoglobin Concentration : 32.8 gm/dL  Auto Neutrophil # : x  Auto Lymphocyte # : x  Auto Monocyte # : x  Auto Eosinophil # : x  Auto Basophil # : x  Auto Neutrophil % : x  Auto Lymphocyte % : x  Auto Monocyte % : x  Auto Eosinophil % : x  Auto Basophil % : x    01-12    136  |  99  |  12  ----------------------------<  113<H>  4.1   |  24  |  1.05    Ca    9.8      12 Jan 2023 05:38  Phos  4.6     01-12  Mg     2.2     01-12    TPro  8.2  /  Alb  4.0  /  TBili  0.5  /  DBili  x   /  AST  30  /  ALT  36  /  AlkPhos  83  01-11    Urinalysis Basic - ( 11 Jan 2023 21:14 )    Color: Light Yellow / Appearance: Clear / SG: >1.050 / pH: x  Gluc: x / Ketone: Negative  / Bili: Negative / Urobili: Negative   Blood: x / Protein: Trace / Nitrite: Negative   Leuk Esterase: Small / RBC: 1 /hpf / WBC 9 /HPF   Sq Epi: x / Non Sq Epi: 3 /hpf / Bacteria: Negative    < from: MR Head No Cont (01.12.23 @ 16:58) >    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   Significant stenosis of the basilar artery. The posterior communicating   arteries help supply the posterior circulation. Noninvasive flow MR   angiography as above.    < end of copied text >      ALLERGIES  No Known Allergies      MEDICATIONS   aspirin enteric coated 81 milliGRAM(s) Oral daily  atorvastatin 80 milliGRAM(s) Oral at bedtime  bisacodyl 5 milliGRAM(s) Oral every 12 hours PRN  clopidogrel Tablet 75 milliGRAM(s) Oral daily  enoxaparin Injectable 40 milliGRAM(s) SubCutaneous every 24 hours  polyethylene glycol 3350 17 Gram(s) Oral daily  senna 2 Tablet(s) Oral at bedtime      ----------------------------------------------------------------------------------------  PHYSICAL EXAM  Constitutional - NAD, Comfortable, in chair   Chest - Breathing comfortably, room air   Cardiovascular - S1S2   Abdomen - Soft   Extremities - No C/C/E, No calf tenderness   Neurologic Exam -      follows commands               Cognitive - Awake, Alert, AAO to self, place: hospital, month: january     Communication - +aphasia, hypophonic         Motor - 5/5     Sensory - Intact to LT     Psychiatric - Mood stable, Affect WNL  ----------------------------------------------------------------------------------------  ASSESSMENT/PLAN  77yFemale h/o HTN with functional deficits after CVA  MRI with left parietal infarct  Pain - Tylenol  DVT PPX - SCDs lovenox   Rehab - Will continue to follow for ongoing rehab needs and recommendations.   continue bedside therapy, PT/OT/SLP    Recommend ACUTE inpatient rehabilitation for the functional deficits consisting of 3 hours of therapy/day & 24 hour RN/daily PMR physician for comorbid medical management. Patient will be able to tolerate 3 hours a day.   
CHIEF COMPLAINT:    HISTORY OF PRESENT ILLNESS:  77y (15-Mj-1945) woman with HTN presenting with expressive aphasia since 1/8. Daughter at bedside reports finding the patient in her bedroom. She was trying to tell her daughter that she had trouble speaking. She new what she wanted to say,  but cound't find the words to express herself. The daughter reports she had a similar episode in the past and was found to have a UTI. She was treated with antibiotics and symptoms resolved. She took her to urgent care since she thought this was a UTI and they prescribed her antibiotics. Patient did not improve with antibiotics, which prompted them to come to the ED. Patient is noncompliant with blood pressure medication. Denies weakness, numbness, headache. At baseline, patient occasionally requires assistance to ambulate, but refuses to use walker. She requires some assistance with ADLs.     NIHSS 4  mRS 2  No previous cardiac evaluation   Occasional palpitations.       PAST MEDICAL & SURGICAL HISTORY:          MEDICATIONS:  aspirin enteric coated 81 milliGRAM(s) Oral daily  clopidogrel Tablet 75 milliGRAM(s) Oral Once  enoxaparin Injectable 40 milliGRAM(s) SubCutaneous every 24 hours            atorvastatin 80 milliGRAM(s) Oral at bedtime    sodium chloride 0.9%. 1000 milliLiter(s) IV Continuous <Continuous>      FAMILY HISTORY:      SOCIAL HISTORY:    [ ] Non-smoker  [ ] Smoker  [ ] Alcohol    Allergies    No Known Allergies    Intolerances    	    REVIEW OF SYSTEMS:  CONSTITUTIONAL: No fever, weight loss,+ fatigue  EYES: No eye pain, visual disturbances, or discharge  ENMT:  No difficulty hearing, tinnitus, vertigo; No sinus or throat pain  NECK: No pain or stiffness  RESPIRATORY: No cough, wheezing, chills or hemoptysis; No Shortness of Breath  CARDIOVASCULAR: No chest pain, palpitations, passing out, dizziness, or leg swelling  GASTROINTESTINAL: No abdominal or epigastric pain. No nausea, vomiting, or hematemesis; No diarrhea or constipation. No melena or hematochezia.  GENITOURINARY: No dysuria, frequency, hematuria, or incontinence  NEUROLOGICAL: No headaches, memory loss, loss of strength, numbness, or tremors  SKIN: No itching, burning, rashes, or lesions   LYMPH Nodes: No enlarged glands  ENDOCRINE: No heat or cold intolerance; No hair loss  MUSCULOSKELETAL: No joint pain or swelling; No muscle, back, or extremity pain  PSYCHIATRIC: No depression, anxiety, mood swings, or difficulty sleeping  HEME/LYMPH: No easy bruising, or bleeding gums  ALLERY AND IMMUNOLOGIC: No hives or eczema	    [ ] All others negative	  [ ] Unable to obtain    PHYSICAL EXAM:  T(C): 36.8 (01-12-23 @ 09:47), Max: 37.2 (01-11-23 @ 14:09)  HR: 76 (01-12-23 @ 09:47) (70 - 86)  BP: 126/77 (01-12-23 @ 09:47) (116/66 - 184/92)  RR: 18 (01-12-23 @ 09:47) (17 - 20)  SpO2: 96% (01-12-23 @ 09:47) (94% - 98%)  Wt(kg): --  I&O's Summary      Appearance: Normal	  HEENT:   Normal oral mucosa, PERRL, EOMI	  Lymphatic: No lymphadenopathy  Cardiovascular: Normal S1 S2, No JVD, + 2/6 systolic ejection murmurs, No edema  Respiratory: Lungs clear to auscultation	  Psychiatry: A & O x 3, Mood & affect appropriate  Gastrointestinal:  Soft, Non-tender, + BS	  Skin: No rashes, No ecchymoses, No cyanosis	  Extremities: Normal range of motion, No clubbing, cyanosis or edema  Vascular: Peripheral pulses palpable 2+ bilaterally  Neurologic Exam:  Mental status - Awake, Alert, Oriented to person, place, time. Speech dysfluent, mildly dysarthric and hypophonic, impaired ability to repeat and naming intact. Follows simple and complex commands. Attention/concentration, recent and remote memory (including registration and recall), and fund of knowledge intact    Cranial nerves - PERRL, BTT b/l, EOMI, face sensation (V1-V3) intact b/l, facial strength without asymmetry b/l, hearing intact b/l, palate with symmetric elevation, trapezius  5/5 strength b/l, tongue midline on protrusion with full lateral movement    Motor - Normal bulk and tone throughout. No pronator drift.  Strength testing  All extremities 5/5     Sensation - Light touch intact throughout    Coordination - Finger to Nose intact b/l. No tremors appreciated    Gait and station -Deferred due to fall safety risk  TELEMETRY: 	  SR   ECG:  	  RADIOLOGY:  < from: Xray Chest 1 View- PORTABLE-Urgent (01.11.23 @ 14:19) >    ACC: [id number] EXAM:  XR CHEST PORTABLE URGENT 1V                          PROCEDURE DATE:  01/11/2023          INTERPRETATION:  EXAMINATION: XR CHEST URGENT    CLINICAL INDICATION: Altered mental status    TECHNIQUE: Single frontal portable view ofthe chest from 1/11/2023 2:02   PM    COMPARISON: None.    FINDINGS:  The heart size is normal.  The lungs are clear.  There is no pneumothorax or pleural effusion.    IMPRESSION:  Clear lungs.    --- End of Report ---           MICHELLE DE PAZ MD; Resident Radiologist  This document has been electronically signed.  CORNELL CARRASCO MD; Attending Radiologist  This document has been electronically signed. Jan 11 2023  2:40PM    < end of copied text >  < from: CT Angio Neck w/ IV Cont (01.11.23 @ 16:47) >    ACC: [id number] EXAM:  CT ANGIO BRAIN (W)AW IC                        ACC: [id number] EXAM:  CT ANGIO NECK (W)AW IC                        ACC: [id number] EXAM:  CT BRAIN                          PROCEDURE DATE:  01/11/2023          INTERPRETATION:  CT HEAD, CT ANGIO NECK WITHOUT AND OR WITH IV CONTRAST,   CT ANGIO BRAIN WITHOUT AND OR WITH IV CONTRAST    CT BRAIN WITHOUT CONTRAST    INDICATIONS:  ams Word finding difficulties for 5 days.    TECHNIQUE:  Serial axial images were obtained from the skull base to the   vertex without the use of contrast.    COMPARISON EXAM: None.    FINDINGS:  Ventricles and sulci: Parenchymal volume loss is present which is   commensurate with patient age.  Intra-axial: Wedge-shaped defect in a right frontal location, compatible   with an old infarct. Subtle decreased attenuation involving the left   parietal brain parenchyma which may represent an acute to subacute   ischemic event. No acute intracranial hemorrhage.  Extra-axial: No extra-axial fluid collectionis identified. Deposition of   calcified plaque at the level of the bilateral carotid siphons.  Calvarium: Intact.    Bilateral optic globes are intact. Imaged paranasal sinuses, bilateral   mastoid air cells, middle ear cavities are clear.    IMPRESSION: (Please see below.)    CTA OF THE [place] AND NECK:    INDICATIONS: ams.  MCT  No additional clinical history was provided.    TECHNIQUE:    CONTRAST/COMPLICATIONS:  IV Contrast: NONE (accession [id number]), IV contrast documented in   unlinked concurrent exam (accession [id number]), Omnipaque 300 (accession   [id number])  70 cc administered   0 cc discarded  Complications: None reported at time of study completion    CTA [place]:  After the intravenous power injection of non-ionic contrast material,   serial thin sections were obtained through the intracranial circulation   on a multislice CT scanner.  Images were reformatted using a dedicated 3D   software package and viewed on a dedicated workstation in multiple planes.    CTA NECK:  After the intravenous power injection of non-ionic contrast material,   serial thin sections were obtained through the cervical circulation on a   multislice CT scanner.  Images were reformatted using a dedicated 3D   software package and viewed on a dedicated workstation in multiple planes.    COMPARISON EXAMINATION: None.    FINDINGS:      CTA NECK:      CAROTID STENOSIS REFERENCE: (NASCET = 100x1-(N/D)). N=greatest narrowing.   D=normal distal diameter - MILD = <50% stenosis. - MODERATE = 50-69%   stenosis. - SEVERE = 70-89% stenosis. - HAIRLINE/CRITICAL = 90-99%   stenosis. - OCCLUDED = 100% stenosis.    Imaging performed following administration of IV contrast. Reconstruction   performed in axial, coronal, and sagittal planes.    COMPARISON: None    FINDINGS:  The aortic arch and origins of the great vessels are within normal limits.    Right:  The right common carotid artery emanates from the brachiocephalic   artery. The right common carotid artery is normal in course and caliber   to the carotid bifurcation. Mild deposition of calcified plaque along the   proximal aspect of the right internal carotid artery without significant   stenosis based on NASCET criteria. The right internal carotid artery has   normal courseand caliber to the intracranial circulation.    The right vertebral artery emanates from the right subclavian artery. The   right vertebral artery is normal in course and caliber to the   intracranial circulation and vertebrobasilar confluence.    Left: The left common carotid artery emanates from the aortic arch. The   left common carotid artery is normal in course and caliber to the carotid   bifurcation. Origin of the left internal carotid artery is unremarkable   based on NASCET criteria. Theleft internal carotid artery follows an   asymmetric medial course at the level of the oral pharynx and is normal   in caliber to the intracranial circulation.    The left vertebral artery emanates from the left subclavian artery. Mild   dominance ofthe left vertebral artery. The left vertebral artery is   normal in course and caliber to the intracranial circulation and   vertebrobasilar confluence.      IMPRESSION: (Please see below)        CTA [place] WITH CONTRAST        COMPARISON EXAM: None    CTA COW:    Flow of contrast is appreciated within the bilateral petrous,   precavernous, cavernous, and supraclinoid portions of the bilateral   internal carotid arteries. Bilateral middle cerebral arteries are   visualized. Question stenosis versus occlusion involving a distal M2   branch of the left middle cerebral artery. Right A1 segment is   hypoplastic. Bilateral A2 segments of the anterior cerebral artery appear   to receive vascular flow from the left A1 segment..    Distal intracranial bilateral vertebral arteries are visualized. There is   short segment focus of stenosis involving the distal aspect of the   intracranial right vertebral artery, just proximal to the vertebrobasilar   confluence. In addition, there is a short segment focus of stenosis   involving the midportion of the basilar artery. Bilateral superior   cerebellar arteries emanate from the distal aspect of the basilar artery.   There are bilateral fetal origins of the posterior cerebral artery    No aneurysm about the [place]. Tiny aneurysms can be beyond the   resolution of CTA technique.    IMPRESSIONS:      CT BRAIN WITHOUT CONTRAST:  1. Subtle decreased attenuation involving the left parietal brain   parenchyma which may represent an acute to subacute ischemic event. No   acute intracranial hemorrhage  2. Wedge-shaped defect in a right frontal location, compatible with an   old infarct.      CTA COW:  1. Hypoplastic right A1 segment. Bilateral A2 segment appear to receive   vascular flow from the left A1 segment.  2. Short segment high-grade stenosis of the distal aspect of the   intracranial right vertebral artery.  3. Short segment stenosis within the midportion of the basilar artery.  4. Fetal origins of the bilateral posterior cerebral arteries.  5. Question stenosis versus occlusion involving a distal M2 branch of the   left middle cerebral artery.    CTA NECK: Patent bilateral common carotid arteries and bilateral interanl   carotid arteries. No hemodynamically significant stenosis at the  ICA   origins based on NASCET criteria.  Bilateral vertebral arteries are patent without flow limiting stenosis.   Mild dominance of the left vertebral artery.     If the patient has persistent symptoms, consideration could be given to   brain MRI brain and/or MRA if clinically warranted and if there are no   MRI contraindications.    Findings were communicated by Dr. Behr-Ventura to Dr. Jeyson tobar in   the emergency department at approximately 5:20 PM 1/11/2023..    --- End of Report ---            DAWN BEHR-VENTURA MD; Attending Radiologist  This document has been electronically signed. Jan 11 2023  5:23PM    < end of copied text >    OTHER: 	  	  LABS:	 	    CARDIAC MARKERS:                                  14.7   5.44  )-----------( 267      ( 12 Jan 2023 05:39 )             44.8     01-12    136  |  99  |  12  ----------------------------<  113<H>  4.1   |  24  |  1.05    Ca    9.8      12 Jan 2023 05:38  Phos  4.6     01-12  Mg     2.2     01-12    TPro  8.2  /  Alb  4.0  /  TBili  0.5  /  DBili  x   /  AST  30  /  ALT  36  /  AlkPhos  83  01-11    proBNP:   Lipid Profile:   HgA1c:   TSH:

## 2023-01-13 NOTE — PROGRESS NOTE ADULT - SUBJECTIVE AND OBJECTIVE BOX
Subjective: Patient seen and examined. No new events except as noted.   feels ok     REVIEW OF SYSTEMS:    CONSTITUTIONAL: + weakness, fevers or chills  EYES/ENT: No visual changes;  No vertigo or throat pain   NECK: No pain or stiffness  RESPIRATORY: No cough, wheezing, hemoptysis; No shortness of breath  CARDIOVASCULAR: No chest pain or palpitations  GASTROINTESTINAL: No abdominal or epigastric pain. No nausea, vomiting, or hematemesis; No diarrhea or constipation. No melena or hematochezia.  GENITOURINARY: No dysuria, frequency or hematuria  NEUROLOGICAL: No numbness or weakness  SKIN: No itching, burning, rashes, or lesions   All other review of systems is negative unless indicated above.    MEDICATIONS:  MEDICATIONS  (STANDING):  aspirin enteric coated 81 milliGRAM(s) Oral daily  atorvastatin 80 milliGRAM(s) Oral at bedtime  clopidogrel Tablet 75 milliGRAM(s) Oral daily  enoxaparin Injectable 40 milliGRAM(s) SubCutaneous every 24 hours  polyethylene glycol 3350 17 Gram(s) Oral daily  senna 2 Tablet(s) Oral at bedtime      PHYSICAL EXAM:  T(C): 36.8 (01-13-23 @ 09:38), Max: 37.1 (01-12-23 @ 13:18)  HR: 76 (01-13-23 @ 09:38) (67 - 84)  BP: 134/77 (01-13-23 @ 09:38) (108/73 - 160/71)  RR: 18 (01-13-23 @ 09:38) (18 - 18)  SpO2: 99% (01-13-23 @ 09:38) (94% - 99%)  Wt(kg): --  I&O's Summary    12 Jan 2023 07:01  -  13 Jan 2023 07:00  --------------------------------------------------------  IN: 340 mL / OUT: 0 mL / NET: 340 mL    13 Jan 2023 07:01  -  13 Jan 2023 11:22  --------------------------------------------------------  IN: 320 mL / OUT: 0 mL / NET: 320 mL          Appearance: Normal	  HEENT:   Normal oral mucosa, PERRL, EOMI	  Lymphatic: No lymphadenopathy  Cardiovascular: Normal S1 S2, No JVD, + 2/6 systolic ejection murmurs, No edema  Respiratory: Lungs clear to auscultation	  Psychiatry: A & O x 3, Mood & affect appropriate  Gastrointestinal:  Soft, Non-tender, + BS	  Skin: No rashes, No ecchymoses, No cyanosis	  Extremities: Normal range of motion, No clubbing, cyanosis or edema  Vascular: Peripheral pulses palpable 2+ bilaterally  Neurologic Exam:  Mental status - Awake, Alert, Oriented to person, place, time. Speech dysfluent, mildly dysarthric and hypophonic, impaired ability to repeat and naming intact. Follows simple and complex commands. Attention/concentration, recent and remote memory (including registration and recall), and fund of knowledge intact    Cranial nerves - PERRL, BTT b/l, EOMI, face sensation (V1-V3) intact b/l, facial strength without asymmetry b/l, hearing intact b/l, palate with symmetric elevation, trapezius  5/5 strength b/l, tongue midline on protrusion with full lateral movement    Motor - Normal bulk and tone throughout. No pronator drift.  Strength testing  All extremities 5/5     Sensation - Light touch intact throughout    Coordination - Finger to Nose intact b/l. No tremors appreciated    Gait and station -Deferred due to fall safety risk        LABS:    CARDIAC MARKERS:                                14.7   5.44  )-----------( 267      ( 12 Jan 2023 05:39 )             44.8     01-12    136  |  99  |  12  ----------------------------<  113<H>  4.1   |  24  |  1.05    Ca    9.8      12 Jan 2023 05:38  Phos  4.6     01-12  Mg     2.2     01-12    TPro  8.2  /  Alb  4.0  /  TBili  0.5  /  DBili  x   /  AST  30  /  ALT  36  /  AlkPhos  83  01-11            TELEMETRY: 	SR     ECG:  	  RADIOLOGY: mr< from: MR Angio Head No Cont (01.12.23 @ 16:58) >    ACC: [id number] EXAM:  MR BRAIN                        ACC: [id number] EXAM:  MR ANGIO BRAIN                          PROCEDURE DATE:  01/12/2023          INTERPRETATION:  CLINICAL INDICATION: Expressive aphasia      Magnetic resonance imaging of the brain was carried out with transaxial   SPGR, FLAIR, fast spin echo T2 weighted images, axial susceptibility   weighted series, diffusion weighted series and sagittal T1 weighted   series on a 1.5 Kimberly magnet.    Comparison is made with the prior CT/CTA of 1/11/2013.    There is an old right frontal cortical infarct. There is extensive small   vessel white matter ischemic change noted is infarct in the left parietal   cortex diffusion restriction. No acute hemorrhage is identified.        A 2 D and 3-D axial noncontrast MRA were performed on the cervical and   intracranial vessels, respectively. Intravascular flow quantification was   performed using gated 2D phase contrast MR, imaged perpendicular to the   vessel axis.  Images were post processed NOVA software and a NOVA flow   study report is available.    There is marked narrowing of the mid basilar artery. The left vertebral   artery is dominant. Bilateral posterior communicating arteries supply the   posterior cerebral arteries. Thereis a relatively hypoplastic right A1   and A2 segments.    Flow is as follows in milliliters per minute.      JAMIA 188, RMCA 128, RACA 6, RACA2 19    LICA 233, LMCA 86, LACA 65, LACA2 46    RVA 4, LVA 36, BA 39, BA distal 23, RPCA 47, RPCOM 48 anterior to   posterior, LPCA 58, LPCOM 55 anterior to posterior.    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   Significant stenosis of the basilar artery. The posterior communicating   arteries help supply the posterior circulation. Noninvasive flow MR   angiography as above.    --- End of Report ---            JENNY UNDERWOOD MD; Attending Radiologist  This document has been electronically signed. Jan 12 2023  5:36PM    < end of copied text >    DIAGNOSTIC TESTING:  [ ] Echocardiogram:  < from: TTE with Doppler (w/Cont) (01.12.23 @ 07:43) >    Patient name: BRANDON BUCK  YOB: 1945   Age: 77 (F)   MR#: [id number]  Study Date: 1/12/2023  Location: O/PSonographer: Brook Law RDCS  Study quality: Technically difficult  Referring Physician: YUMIKO CASTILLO MD  Blood Pressure: 116/66 mmHg  Height: 155 cm  Weight: 61 kg  BSA: 1.6 m2  Heart Rate: 75 mmHg  ------------------------------------------------------------------------  PROCEDURE: Transthoracic echocardiogram with 2-D, M-Mode  and complete spectral and color flow Doppler. Verbal  consent was obtained for injection of  Ultrasonic Enhancing  Agent following a discussion of risks and benefits.  Following intravenous injection of Ultrasonic Enhancing  Agent, harmonic imaging was performed.  INDICATION: Cerebral infarction, unspecified (I63.9)  ------------------------------------------------------------------------  Dimensions:    Normal Values:  LA:     3.0    2.0 - 4.0 cm  Ao:     2.9    2.0 - 3.8 cm  SEPTUM: 1.5    0.6 - 1.2 cm  PWT:    1.0    0.6 - 1.1 cm  LVIDd:  2.8    3.0 - 5.6 cm  LVIDs:  1.5    1.8 - 4.0 cm  Derived variables:  LVMI: 67 g/m2  RWT: 0.71  Fractional short: 46 %  EF (Joshi Rule): 70 %Doppler Peak Velocity (m/sec):  AoV=2.6  ------------------------------------------------------------------------  Observations:  Mitral Valve: Mitral annular calcification, otherwise  normal mitral valve. No mitral regurgitation seen.  Aortic Valve/Aorta: Calcified trileaflet aortic valve with  decreased opening. However, aortic valve is not well  visualized. Peak transaortic valve gradient equals 27 mm  Hg, mean transaortic valve gradient equals 16 mm Hg,  estimated aortic valve area equals 1.9 sqcm (by continuity  equation), aortic valve velocity time integral equals 44  cm, consistent with mild aortic stenosis.  peak velocity: 2.6 m/s  peak gradient 27 mmHg  LVOT VTI: 24 cm  AV VTI: 44 cm  DVI: 0.4 No aortic valve regurgitation seen. Peak left  ventricular outflow tract gradient equals 5 mm Hg, mean  gradient is equal to 3 mm Hg, LVOT velocity time integral  equals 24 cm.  Aortic Root: 2.9 cm.  Ascending Aorta: 3 cm.  LVOT diameter: 1.9 cm.  Left Atrium: Normal left atrium.  LA volume index = 17  cc/m2.  Left Ventricle: Hyperdynamic left ventricular systolic  function. Endocardial visualization enhanced with  intravenous injection of Ultrasonic Enhancing Agent  (Definity). LVEF calculated using biplane Joshi's method  is 70%. No left ventricular thrombus. There is basal septal  hypertriphy. Normal diastolic function.  Right Heart: Normal right atrium. The right ventricle is  not well visualized; grossly normal right ventricular  systolic function. Normal tricuspid valve. No tricuspid  regurgitation. Normal pulmonic valve.  Pericardium/Pleura: No pericardial effusion seen.  Hemodynamic: IVC is not well visualized. Unable to estimate  RVSP.  ------------------------------------------------------------------------  Conclusions:  1. Calcified trileaflet aortic valve with decreased  opening. However, aortic valve is not well visualized. Peak  transaortic valve gradient equals 27 mm Hg, mean  transaortic valve gradient equals 16 mm Hg, estimated  aortic valve area equals 1.9 sqcm (by continuity equation),  aortic valve velocity time integral equals 44 cm,  consistent with mild aortic stenosis.  peak velocity: 2.6 m/s  peak gradient 27 mmHg  LVOT VTI: 24 cm  AV VTI: 44 cm  DVI: 0.4  2. Normal left atrium.  LA volume index = 17 cc/m2. There  is basal septal hypertriphy.Hyperdynamic left ventricular  systolic function. Endocardial visualization enhanced with  intravenous injection of Ultrasonic Enhancing Agent  (Definity). LVEF calculated using biplane Joshi's method  is 70%.No left ventricular thrombus.Normal diastolic  function.  3. Normal right atrium.The rightventricle is not well  visualized; grossly normal right ventricular systolic  function.Normal tricuspid valve. No tricuspid  regurgitation.  4. No pericardial effusion seen.  *** No previous Echo exam.  ------------------------------------------------------------------------  Confirmed on  1/12/2023 - 18:12:41 by Lavonne Dominguez M.D.  ------------------------------------------------------------------------    < end of copied text >    [ ]  Catheterization:  [ ] Stress Test:    OTHER:

## 2023-01-13 NOTE — PROGRESS NOTE ADULT - ASSESSMENT
Patient BRANDON BUCK is a 77y  woman with HTN presenting with expressive aphasia since 1/8/23.    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 Physical therapy/OT: AR    ANTITHROMBOTIC THERAPY: ASA    PULMONARY: CXR clear, protecting airway, saturating well     CARDIOVASCULAR: check TTE, cardiac monitoring                              SBP goal:     GASTROINTESTINAL:  dysphagia screen- passed, tolerating diet      Diet: Regular    RENAL: BUN/Cr stable, good urine output      Na Goal: Greater than 135     Aguilar: No    HEMATOLOGY: H/H stable, Platelets normal      DVT ppx: Heparin s.c [] LMWH [x    ID: afebrile, no leukocytosis     OTHER: Plan of care discussed with patient at bedside    DISPOSITION: AR once stable and workup is complete      CORE MEASURES:        Admission NIHSS: 4     TPA: [] YES [x] NO      LDL/HDL: 133/36     Depression Screen: 0     Statin Therapy: Y     Dysphagia Screen: [X] PASS [] FAIL     Smoking [] YES [X] NO      Afib [] YES [X] NO     Stroke Education [X] YES [] NO    Obtain screening lower extremity venous ultrasound in patients who meet 1 or more of the following criteria as patient is high risk for DVT/PE on admission:   [] History of DVT/PE  []Hypercoagulable states (Factor V Leiden, Cancer, OCP, etc. )  []Prolonged immobility (hemiplegia/hemiparesis/post operative or any other extended immobilization)  [] Transferred from outside facility (Rehab or Long term care)  [] Age </= to 50.  Patient BRANDON BUCK is a 77y  woman with HTN presenting with expressive aphasia since 1/8/23.    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 Continue close monitoring for neurologic deterioration, permissive HTN to gradual normotension, : started on high dose statin, MRI Brain w/o, MRA Head w/o noted above. Physical therapy/OT: AR    ANTITHROMBOTIC THERAPY: ASA and plavix for 3 months per [facility]    PULMONARY: CXR clear (1/11), protecting airway, saturating well     CARDIOVASCULAR: TTE shows EF of 70%, mild AS, basal septal hypertrophy hyperdynamic LV. Continue with cardiac monitoring. Will need ILR placement to monitor long term for afib.                             SBP goal: Permissive HTN to gradual normotension     GASTROINTESTINAL:  dysphagia screen- passed, tolerating diet      Diet: Regular    RENAL: BUN/Cr stable, good urine output      Na Goal: Greater than 135     Aguilar: No    HEMATOLOGY: H/H stable, Platelets 267     DVT ppx: Heparin s.c [] LMWH [x]    ID: afebrile, no leukocytosis     OTHER: Plan of care discussed with patient at bedside.    DISPOSITION: AR once stable and workup is complete    CORE MEASURES:        Admission NIHSS: 4     TPA: [] YES [x] NO      LDL/HDL: 133/36     Depression Screen: 0     Statin Therapy: Y     Dysphagia Screen: [X] PASS [] FAIL     Smoking [] YES [X] NO      Afib [] YES [X] NO     Stroke Education [X] YES [] NO    Obtain screening lower extremity venous ultrasound in patients who meet 1 or more of the following criteria as patient is high risk for DVT/PE on admission:   [] History of DVT/PE  []Hypercoagulable states (Factor V Leiden, Cancer, OCP, etc. )  []Prolonged immobility (hemiplegia/hemiparesis/post operative or any other extended immobilization)  [] Transferred from outside facility (Rehab or Long term care)  [] Age </= to 50.

## 2023-01-14 LAB
-  AMIKACIN: SIGNIFICANT CHANGE UP
-  AZTREONAM: SIGNIFICANT CHANGE UP
-  CEFEPIME: SIGNIFICANT CHANGE UP
-  CEFTAZIDIME: SIGNIFICANT CHANGE UP
-  CIPROFLOXACIN: SIGNIFICANT CHANGE UP
-  GENTAMICIN: SIGNIFICANT CHANGE UP
-  IMIPENEM: SIGNIFICANT CHANGE UP
-  LEVOFLOXACIN: SIGNIFICANT CHANGE UP
-  MEROPENEM: SIGNIFICANT CHANGE UP
-  PIPERACILLIN/TAZOBACTAM: SIGNIFICANT CHANGE UP
-  TOBRAMYCIN: SIGNIFICANT CHANGE UP
CULTURE RESULTS: SIGNIFICANT CHANGE UP
METHOD TYPE: SIGNIFICANT CHANGE UP
ORGANISM # SPEC MICROSCOPIC CNT: SIGNIFICANT CHANGE UP
SPECIMEN SOURCE: SIGNIFICANT CHANGE UP

## 2023-01-14 RX ADMIN — ENOXAPARIN SODIUM 40 MILLIGRAM(S): 100 INJECTION SUBCUTANEOUS at 13:20

## 2023-01-14 RX ADMIN — CLOPIDOGREL BISULFATE 75 MILLIGRAM(S): 75 TABLET, FILM COATED ORAL at 11:17

## 2023-01-14 RX ADMIN — ATORVASTATIN CALCIUM 80 MILLIGRAM(S): 80 TABLET, FILM COATED ORAL at 21:19

## 2023-01-14 RX ADMIN — Medication 81 MILLIGRAM(S): at 11:17

## 2023-01-14 NOTE — PROGRESS NOTE ADULT - ASSESSMENT
Patient BRANDON BUCK is a 77y  woman with HTN presenting with expressive aphasia since 1/8/23.    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 Continue close monitoring for neurologic deterioration, permissive HTN to gradual normotension, : started on high dose statin, MRI Brain w/o, MRA Head w/o noted above. Physical therapy/OT: AR    ANTITHROMBOTIC THERAPY: ASA and plavix for 3 months per [facility]    PULMONARY: CXR clear (1/11), protecting airway, saturating well     CARDIOVASCULAR: TTE shows EF of 70%, mild AS, basal septal hypertrophy hyperdynamic LV. Continue with cardiac monitoring. Will need ILR placement to monitor long term for afib.                             SBP goal: Permissive HTN to gradual normotension     GASTROINTESTINAL:  dysphagia screen- passed, tolerating diet      Diet: Regular    RENAL: BUN/Cr stable, good urine output      Na Goal: Greater than 135     Aguilar: No    HEMATOLOGY: H/H stable, Platelets 267     DVT ppx: Heparin s.c [] LMWH [x]    ID: afebrile, no leukocytosis     OTHER: Plan of care discussed with patient at bedside.    DISPOSITION: AR once stable and workup is complete    CORE MEASURES:        Admission NIHSS: 4     TPA: [] YES [x] NO      LDL/HDL: 133/36     Depression Screen: 0     Statin Therapy: Y     Dysphagia Screen: [X] PASS [] FAIL     Smoking [] YES [X] NO      Afib [] YES [X] NO     Stroke Education [X] YES [] NO    Obtain screening lower extremity venous ultrasound in patients who meet 1 or more of the following criteria as patient is high risk for DVT/PE on admission:   [] History of DVT/PE  []Hypercoagulable states (Factor V Leiden, Cancer, OCP, etc. )  []Prolonged immobility (hemiplegia/hemiparesis/post operative or any other extended immobilization)  [] Transferred from outside facility (Rehab or Long term care)  [] Age </= to 50.

## 2023-01-14 NOTE — PROGRESS NOTE ADULT - SUBJECTIVE AND OBJECTIVE BOX
THE PATIENT WAS SEEN AND EXAMINED BY ME WITH THE HOUSESTAFF AND STROKE TEAM DURING MORNING ROUNDS.   HPI:  77y (15-Mj-1945) woman with HTN presenting with expressive aphasia since 1/8. Daughter at bedside reports finding the patient in her bedroom. She was trying to tell her daughter that she had trouble speaking. She new what she wanted to say,  but cound't find the words to express herself. The daughter reports she had a similar episode in the past and was found to have a UTI. She was treated with antibiotics and symptoms resolved. She took her to urgent care since she thought this was a UTI and they prescribed her antibiotics. Patient did not improve with antibiotics, which prompted them to come to the ED. Patient is noncompliant with blood pressure medication. Denies weakness, numbness, headache. At baseline, patient occasionally requires assistance to ambulate, but refuses to use walker. She requires some assistance with ADLs.     NIHSS 4  mRS 2    SUBJECTIVE: No events overnight.  No new neurologic complaints.      aspirin enteric coated 81 milliGRAM(s) Oral daily  atorvastatin 80 milliGRAM(s) Oral at bedtime  bisacodyl 5 milliGRAM(s) Oral every 12 hours PRN  enoxaparin Injectable 40 milliGRAM(s) SubCutaneous every 24 hours  polyethylene glycol 3350 17 Gram(s) Oral daily  senna 2 Tablet(s) Oral at bedtime    PHYSICAL EXAM:   Vital Signs Last 24 Hrs  Vital Signs Last 24 Hrs  T(C): 37.1 (14 Jan 2023 09:18), Max: 37.1 (14 Jan 2023 09:18)  T(F): 98.8 (14 Jan 2023 09:18), Max: 98.8 (14 Jan 2023 09:18)  HR: 74 (14 Jan 2023 09:18) (62 - 74)  BP: 151/82 (14 Jan 2023 09:18) (122/78 - 164/78)  RR: 18 (14 Jan 2023 09:18) (18 - 18)  SpO2: 96% (14 Jan 2023 09:18) (96% - 98%)    Parameters below as of 14 Jan 2023 09:18  Patient On (Oxygen Delivery Method): room air    General: No acute distress  HEENT: EOM intact, visual fields full  Abdomen: Soft, nontender, nondistended   Extremities: No edema    NEUROLOGICAL EXAM:  Mental status: Awake, alert, oriented x3, expressive aphasia  Cranial Nerves: No facial asymmetry, no nystagmus, no dysarthria,  tongue midline  Motor exam: Normal tone, no drift, 5/5 RUE, 5/5 RLE, 5/5 LUE, 5/5 LLE, normal fine finger movements.  Sensation: Intact to light touch   Coordination/ Gait: No dysmetria, SAL[place]OR intact and symmetric bilaterally    LABS:                        14.7   5.44  )-----------( 267      ( 12 Jan 2023 05:39 )             44.8    01-12    136  |  99  |  12  ----------------------------<  113<H>  4.1   |  24  |  1.05    Ca    9.8      12 Jan 2023 05:38  Phos  4.6     01-12  Mg     2.2     01-12    TPro  8.2  /  Alb  4.0  /  TBili  0.5  /  DBili  x   /  AST  30  /  ALT  36  /  AlkPhos  83  01-11      IMAGING: Reviewed by me.   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   Significant stenosis of the basilar artery. The posterior communicating   arteries help supply the posterior circulation    01/11/23:  CT BRAIN WITHOUT CONTRAST:  1. Subtle decreased attenuation involving the left parietal brain   parenchyma which may represent an acute to subacute ischemic event. No   acute intracranial hemorrhage  2. Wedge-shaped defect in a right frontal location, compatible with an   old infarct.    CTA COW:  1. Hypoplastic right A1 segment. Bilateral A2 segment appear to receive   vascular flow from the left A1 segment.  2. Short segment high-grade stenosis of the distal aspect of the   intracranial right vertebral artery.  3. Short segment stenosis within the midportion of the basilar artery.  4. Fetal origins of the bilateral posterior cerebral arteries.  5. Question stenosis versus occlusion involving a distal M2 branch of the   left middle cerebral artery.    CTA NECK: Patent bilateral common carotid arteries and bilateral internal   carotid arteries. No hemodynamically significant stenosis at the  ICA   origins based on NASCET criteria.  Bilateral vertebral arteries are patent without flow limiting stenosis.   Mild dominance of the left vertebral artery.

## 2023-01-14 NOTE — PROGRESS NOTE ADULT - SUBJECTIVE AND OBJECTIVE BOX
pt seen and examined, no complaints, ROS - .          aspirin enteric coated 81 milliGRAM(s) Oral daily  atorvastatin 80 milliGRAM(s) Oral at bedtime  bisacodyl 5 milliGRAM(s) Oral every 12 hours PRN  clopidogrel Tablet 75 milliGRAM(s) Oral daily  enoxaparin Injectable 40 milliGRAM(s) SubCutaneous every 24 hours  polyethylene glycol 3350 17 Gram(s) Oral daily  senna 2 Tablet(s) Oral at bedtime          Hemoglobin: 14.7 g/dL (01-12 @ 05:39)  Hemoglobin: 14.7 g/dL (01-11 @ 14:36)            Creatinine Trend: 1.05<--, 1.03<--    COAGS:           T(C): 36.6 (01-14-23 @ 05:00), Max: 36.8 (01-13-23 @ 09:38)  HR: 66 (01-14-23 @ 05:00) (62 - 76)  BP: 162/84 (01-14-23 @ 05:00) (122/78 - 164/78)  RR: 18 (01-14-23 @ 05:00) (18 - 18)  SpO2: 96% (01-14-23 @ 05:00) (96% - 99%)  Wt(kg): --    I&O's Summary    13 Jan 2023 07:01  -  14 Jan 2023 07:00  --------------------------------------------------------  IN: 320 mL / OUT: 0 mL / NET: 320 mL        Appearance: Normal appearing adult woman in no acute distress 	  HEENT:   Normal oral mucosa, PERRL, EOMI	  Lymphatic: No lymphadenopathy , no edema  Cardiovascular: Normal S1 S2, No JVD, No murmurs , Peripheral pulses palpable 2+ bilaterally  Respiratory: Lungs clear to auscultation, normal effort 	  Gastrointestinal:  Soft, Non-tender, + BS	  Skin: No rashes, No ecchymoses, No cyanosis, warm to touch  Musculoskeletal: Normal range of motion, normal strength  Psychiatry:  Mood is "thumbs up" and affect seems flat.  Dense expressive aphasia, but able to motion with her hands and head that she understands our discussion.      TELEMETRY: NSR	      Echo: < from: TTE with Doppler (w/Cont) (01.12.23 @ 07:43) >  Dimensions:    Normal Values:  LA:     3.0    2.0 - 4.0 cm  Ao:     2.9    2.0 - 3.8 cm  SEPTUM: 1.5    0.6 - 1.2 cm  PWT:    1.0    0.6 - 1.1 cm  LVIDd:  2.8    3.0 - 5.6 cm  LVIDs:  1.5    1.8 - 4.0 cm  Derived variables:  LVMI: 67 g/m2  RWT: 0.71  Fractional short: 46 %  EF (Joshi Rule): 70 %Doppler Peak Velocity (m/sec):  AoV=2.6  ------------------------------------------------------------------------  Observations:  Mitral Valve: Mitral annular calcification, otherwise  normal mitral valve. No mitral regurgitation seen.  Aortic Valve/Aorta: Calcified trileaflet aortic valve with  decreased opening. However, aortic valve is not well  visualized. Peak transaortic valve gradient equals 27 mm  Hg, mean transaortic valve gradient equals 16 mm Hg,  estimated aortic valve area equals 1.9 sqcm (by continuity  equation), aortic valve velocity time integral equals 44  cm, consistent with mild aortic stenosis.  peak velocity: 2.6 m/s  peak gradient 27 mmHg  LVOT VTI: 24 cm  AV VTI: 44 cm  DVI: 0.4 No aortic valve regurgitation seen. Peak left  ventricular outflow tract gradient equals 5 mm Hg, mean  gradient is equal to 3 mm Hg, LVOT velocity time integral  equals 24 cm.  Aortic Root: 2.9 cm.  Ascending Aorta: 3 cm.  LVOT diameter: 1.9 cm.  Left Atrium: Normal left atrium.  LA volume index = 17  cc/m2.  Left Ventricle: Hyperdynamic left ventricular systolic  function. Endocardial visualization enhanced with  intravenous injection of Ultrasonic Enhancing Agent  (Definity). LVEF calculated using biplane Joshi's method  is 70%. No left ventricular thrombus. There is basal septal  hypertriphy. Normal diastolic function.  Right Heart: Normal right atrium. The right ventricle is  not well visualized; grossly normal right ventricular  systolic function. Normal tricuspid valve. No tricuspid  regurgitation. Normal pulmonic valve.  Pericardium/Pleura: No pericardial effusion seen.  Hemodynamic: IVC is not well visualized. Unable to estimate  RVSP.    < end of copied text >    < from: MR Head No Cont (01.12.23 @ 16:58) >  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   Significant stenosis of the basilar artery. The posterior communicating   arteries help supply the posterior circulation. Noninvasive flow MR   angiography as above.    < end of copied text >  	  	  LABS:	 	                          14.7   5.44  )-----------( 267      ( 12 Jan 2023 05:39 )             44.8     01-12    136  |  99  |  12  ----------------------------<  113<H>  4.1   |  24  |  1.05    Ca    9.8      12 Jan 2023 05:38  Phos  4.6     01-12  Mg     2.2     01-12    TPro  8.2  /  Alb  4.0  /  TBili  0.5  /  DBili  x   /  AST  30  /  ALT  36  /  AlkPhos  83  01-11      ASSESSMENT/PLAN:  Ms Ding is a very pleasant 77y Female here with late presentation of stroke.  Continue aspirin, clopidogrel, and atorvastatin for secondary stroke prevention.  She has a history of HTN, but not diabetes or hyperlipidemia.  She has MRI evidence of old strokes in another vascular territory.  Her echocardiogram is reassuring, normal EF, no patent foramen ovale.  Normal head and neck vasculature otherwise.  Neurology note suggests that her infarct may be embolic due to the size/location, and contributory arrhythmias should be ruled out.  She is referred for a loop recorder specifically for surveillance for paroxysmal atrial fibrillation after an embolic stroke of unknown source.  A repeat stroke from AFib has a high likelihood of being disabling or fatal, and identifying this arrhythmia would change medical management by allowing us to start oral anticoagulation (which she is willing to take).  There is less than 1% risk of infection for this bedside minor surgery.  She prefers this pathway over an event monitor, which would require daily wear for ~30 days at a time.  We will return to bedside later to proceed with implant, see separate operative report.  Afterwards OK for discharge immediately.  Wound check with Dr Kaur.     1/14/2023  pt seen and examined, no complaints, ROS - .          aspirin enteric coated 81 milliGRAM(s) Oral daily  atorvastatin 80 milliGRAM(s) Oral at bedtime  bisacodyl 5 milliGRAM(s) Oral every 12 hours PRN  clopidogrel Tablet 75 milliGRAM(s) Oral daily  enoxaparin Injectable 40 milliGRAM(s) SubCutaneous every 24 hours  polyethylene glycol 3350 17 Gram(s) Oral daily  senna 2 Tablet(s) Oral at bedtime          Hemoglobin: 14.7 g/dL (01-12 @ 05:39)  Hemoglobin: 14.7 g/dL (01-11 @ 14:36)            Creatinine Trend: 1.05<--, 1.03<--    COAGS:           T(C): 36.6 (01-14-23 @ 05:00), Max: 36.8 (01-13-23 @ 09:38)  HR: 66 (01-14-23 @ 05:00) (62 - 76)  BP: 162/84 (01-14-23 @ 05:00) (122/78 - 164/78)  RR: 18 (01-14-23 @ 05:00) (18 - 18)  SpO2: 96% (01-14-23 @ 05:00) (96% - 99%)  Wt(kg): --    I&O's Summary    13 Jan 2023 07:01  -  14 Jan 2023 07:00  --------------------------------------------------------  IN: 320 mL / OUT: 0 mL / NET: 320 mL        Appearance: Normal appearing adult woman in no acute distress 	  HEENT:   Normal oral mucosa, PERRL, EOMI	  Lymphatic: No lymphadenopathy , no edema  Cardiovascular: Normal S1 S2, No JVD, No murmurs , Peripheral pulses palpable 2+ bilaterally  Respiratory: Lungs clear to auscultation, normal effort 	  Gastrointestinal:  Soft, Non-tender, + BS	  Skin: No rashes, No ecchymoses, No cyanosis, warm to touch  Musculoskeletal: Normal range of motion, normal strength  Psychiatry:  Mood is "thumbs up" and affect seems flat.  Dense expressive aphasia, but able to motion with her hands and head that she understands our discussion.      TELEMETRY: NSR	      Echo: < from: TTE with Doppler (w/Cont) (01.12.23 @ 07:43) >  Dimensions:    Normal Values:  LA:     3.0    2.0 - 4.0 cm  Ao:     2.9    2.0 - 3.8 cm  SEPTUM: 1.5    0.6 - 1.2 cm  PWT:    1.0    0.6 - 1.1 cm  LVIDd:  2.8    3.0 - 5.6 cm  LVIDs:  1.5    1.8 - 4.0 cm  Derived variables:  LVMI: 67 g/m2  RWT: 0.71  Fractional short: 46 %  EF (Joshi Rule): 70 %Doppler Peak Velocity (m/sec):  AoV=2.6  ------------------------------------------------------------------------  Observations:  Mitral Valve: Mitral annular calcification, otherwise  normal mitral valve. No mitral regurgitation seen.  Aortic Valve/Aorta: Calcified trileaflet aortic valve with  decreased opening. However, aortic valve is not well  visualized. Peak transaortic valve gradient equals 27 mm  Hg, mean transaortic valve gradient equals 16 mm Hg,  estimated aortic valve area equals 1.9 sqcm (by continuity  equation), aortic valve velocity time integral equals 44  cm, consistent with mild aortic stenosis.  peak velocity: 2.6 m/s  peak gradient 27 mmHg  LVOT VTI: 24 cm  AV VTI: 44 cm  DVI: 0.4 No aortic valve regurgitation seen. Peak left  ventricular outflow tract gradient equals 5 mm Hg, mean  gradient is equal to 3 mm Hg, LVOT velocity time integral  equals 24 cm.  Aortic Root: 2.9 cm.  Ascending Aorta: 3 cm.  LVOT diameter: 1.9 cm.  Left Atrium: Normal left atrium.  LA volume index = 17  cc/m2.  Left Ventricle: Hyperdynamic left ventricular systolic  function. Endocardial visualization enhanced with  intravenous injection of Ultrasonic Enhancing Agent  (Definity). LVEF calculated using biplane Joshi's method  is 70%. No left ventricular thrombus. There is basal septal  hypertriphy. Normal diastolic function.  Right Heart: Normal right atrium. The right ventricle is  not well visualized; grossly normal right ventricular  systolic function. Normal tricuspid valve. No tricuspid  regurgitation. Normal pulmonic valve.  Pericardium/Pleura: No pericardial effusion seen.  Hemodynamic: IVC is not well visualized. Unable to estimate  RVSP.    < end of copied text >    < from: MR Head No Cont (01.12.23 @ 16:58) >  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   Significant stenosis of the basilar artery. The posterior communicating   arteries help supply the posterior circulation. Noninvasive flow MR   angiography as above.    < end of copied text >  	  	  LABS:	 	                          14.7   5.44  )-----------( 267      ( 12 Jan 2023 05:39 )             44.8     01-12    136  |  99  |  12  ----------------------------<  113<H>  4.1   |  24  |  1.05    Ca    9.8      12 Jan 2023 05:38  Phos  4.6     01-12  Mg     2.2     01-12    TPro  8.2  /  Alb  4.0  /  TBili  0.5  /  DBili  x   /  AST  30  /  ALT  36  /  AlkPhos  83  01-11      ASSESSMENT/PLAN:  Ms Ding is a very pleasant 77y Female here with late presentation of stroke.  Continue aspirin, clopidogrel, and atorvastatin for secondary stroke prevention.  She has a history of HTN, but not diabetes or hyperlipidemia.  She has MRI evidence of old strokes in another vascular territory.  Her echocardiogram is reassuring, normal EF, no patent foramen ovale.  Normal head and neck vasculature otherwise.  Neurology note suggests that her infarct may be embolic due to the size/location, and contributory arrhythmias should be ruled out.  She is referred for a loop recorder specifically for surveillance for paroxysmal atrial fibrillation after an embolic stroke of unknown source.  A repeat stroke from AFib has a high likelihood of being disabling or fatal, and identifying this arrhythmia would change medical management by allowing us to start oral anticoagulation (which she is willing to take).  There is less than 1% risk of infection for this bedside minor surgery.  She prefers this pathway over an event monitor, which would require daily wear for ~30 days at a time.  We will return to bedside later to proceed with implant, see separate operative report.  Afterwards OK for discharge immediately.  Wound check with Dr Kaur.

## 2023-01-14 NOTE — PROGRESS NOTE ADULT - SUBJECTIVE AND OBJECTIVE BOX
Subjective: Patient seen and examined. No new events except as noted.     REVIEW OF SYSTEMS:    CONSTITUTIONAL: + weakness, fevers or chills  EYES/ENT: No visual changes;  No vertigo or throat pain   NECK: No pain or stiffness  RESPIRATORY: No cough, wheezing, hemoptysis; No shortness of breath  CARDIOVASCULAR: No chest pain or palpitations  GASTROINTESTINAL: No abdominal or epigastric pain. No nausea, vomiting, or hematemesis; No diarrhea or constipation. No melena or hematochezia.  GENITOURINARY: No dysuria, frequency or hematuria  NEUROLOGICAL: No numbness or weakness  SKIN: No itching, burning, rashes, or lesions   All other review of systems is negative unless indicated above.    MEDICATIONS:  MEDICATIONS  (STANDING):  aspirin enteric coated 81 milliGRAM(s) Oral daily  atorvastatin 80 milliGRAM(s) Oral at bedtime  clopidogrel Tablet 75 milliGRAM(s) Oral daily  enoxaparin Injectable 40 milliGRAM(s) SubCutaneous every 24 hours  polyethylene glycol 3350 17 Gram(s) Oral daily  senna 2 Tablet(s) Oral at bedtime      PHYSICAL EXAM:  T(C): 37.1 (01-14-23 @ 09:18), Max: 37.1 (01-14-23 @ 09:18)  HR: 74 (01-14-23 @ 09:18) (62 - 74)  BP: 151/82 (01-14-23 @ 09:18) (122/78 - 164/78)  RR: 18 (01-14-23 @ 09:18) (18 - 18)  SpO2: 96% (01-14-23 @ 09:18) (96% - 98%)  Wt(kg): --  I&O's Summary    13 Jan 2023 07:01  -  14 Jan 2023 07:00  --------------------------------------------------------  IN: 320 mL / OUT: 0 mL / NET: 320 mL    Appearance: Normal	  HEENT: Normal oral mucosa, PERRL, EOMI	  Lymphatic: No lymphadenopathy , no edema  Cardiovascular: Normal S1 S2, No JVD, No murmurs , Peripheral pulses palpable 2+ bilaterally  Respiratory: Lungs clear to auscultation, normal effort 	  Gastrointestinal: Soft, Non-tender, + BS	  Skin: No rashes, No ecchymoses, No cyanosis, warm to touch  NEUROLOGICAL EXAM:  Mental status: Awake, alert, oriented x3, expressive aphasia  Cranial Nerves: No facial asymmetry, no nystagmus, no dysarthria,  tongue midline  Motor exam: Normal tone, no drift, 5/5 RUE, 5/5 RLE, 5/5 LUE, 5/5 LLE, normal fine finger movements.  Sensation: Intact to light touch   Coordination/ Gait: No dysmetria, SALBADOR intact and symmetric bilaterally  Ext: No edema    LABS:    CARDIAC MARKERS:    proBNP:   Lipid Profile:   HgA1c:   TSH:     TELEMETRY: SR 65-75	    ECG:  	  RADIOLOGY:   DIAGNOSTIC TESTING:  [ ] Echocardiogram:  [ ]  Catheterization:  [ ] Stress Test:    OTHER:

## 2023-01-15 LAB
HCT VFR BLD CALC: 42.9 % — SIGNIFICANT CHANGE UP (ref 34.5–45)
HGB BLD-MCNC: 14 G/DL — SIGNIFICANT CHANGE UP (ref 11.5–15.5)
INR BLD: 1.1 RATIO — SIGNIFICANT CHANGE UP (ref 0.88–1.16)
MCHC RBC-ENTMCNC: 29.1 PG — SIGNIFICANT CHANGE UP (ref 27–34)
MCHC RBC-ENTMCNC: 32.6 GM/DL — SIGNIFICANT CHANGE UP (ref 32–36)
MCV RBC AUTO: 89.2 FL — SIGNIFICANT CHANGE UP (ref 80–100)
NRBC # BLD: 0 /100 WBCS — SIGNIFICANT CHANGE UP (ref 0–0)
PA ADP PRP-ACNC: 102 PRU — LOW (ref 194–417)
PLATELET # BLD AUTO: 253 K/UL — SIGNIFICANT CHANGE UP (ref 150–400)
PROTHROM AB SERPL-ACNC: 12.8 SEC — SIGNIFICANT CHANGE UP (ref 10.5–13.4)
RBC # BLD: 4.81 M/UL — SIGNIFICANT CHANGE UP (ref 3.8–5.2)
RBC # FLD: 12.1 % — SIGNIFICANT CHANGE UP (ref 10.3–14.5)
WBC # BLD: 6.69 K/UL — SIGNIFICANT CHANGE UP (ref 3.8–10.5)
WBC # FLD AUTO: 6.69 K/UL — SIGNIFICANT CHANGE UP (ref 3.8–10.5)

## 2023-01-15 PROCEDURE — 93970 EXTREMITY STUDY: CPT | Mod: 26

## 2023-01-15 RX ORDER — ONDANSETRON 8 MG/1
4 TABLET, FILM COATED ORAL ONCE
Refills: 0 | Status: COMPLETED | OUTPATIENT
Start: 2023-01-15 | End: 2023-01-15

## 2023-01-15 RX ADMIN — ONDANSETRON 4 MILLIGRAM(S): 8 TABLET, FILM COATED ORAL at 05:19

## 2023-01-15 RX ADMIN — ATORVASTATIN CALCIUM 80 MILLIGRAM(S): 80 TABLET, FILM COATED ORAL at 21:02

## 2023-01-15 RX ADMIN — Medication 81 MILLIGRAM(S): at 17:20

## 2023-01-15 NOTE — PROGRESS NOTE ADULT - SUBJECTIVE AND OBJECTIVE BOX
THE PATIENT WAS SEEN AND EXAMINED BY ME WITH THE HOUSESTAFF AND STROKE TEAM DURING MORNING ROUNDS.   HPI:  77y ([id number]-1945) woman with HTN presenting with expressive aphasia since 1/8. Daughter at bedside reports finding the patient in her bedroom. She was trying to tell her daughter that she had trouble speaking. She new what she wanted to say,  but cound't find the words to express herself. The daughter reports she had a similar episode in the past and was found to have a UTI. She was treated with antibiotics and symptoms resolved. She took her to urgent care since she thought this was a UTI and they prescribed her antibiotics. Patient did not improve with antibiotics, which prompted them to come to the ED. Patient is noncompliant with blood pressure medication. Denies weakness, numbness, headache. At baseline, patient occasionally requires assistance to ambulate, but refuses to use walker. She requires some assistance with ADLs.     NIHSS 4  mRS 2    SUBJECTIVE: No events overnight.  No new neurologic complaints.      aspirin enteric coated 81 milliGRAM(s) Oral daily  atorvastatin 80 milliGRAM(s) Oral at bedtime  bisacodyl 5 milliGRAM(s) Oral every 12 hours PRN  enoxaparin Injectable 40 milliGRAM(s) SubCutaneous every 24 hours  polyethylene glycol 3350 17 Gram(s) Oral daily  senna 2 Tablet(s) Oral at bedtime    PHYSICAL EXAM:   Vital Signs Last 24 Hrs  T(C): 36.3 (15 Kiran 2023 05:00), Max: 37.1 (14 Jan 2023 09:18)  T(F): 97.3 (15 Kiran 2023 05:00), Max: 98.8 (14 Jan 2023 09:18)  HR: 80 (15 Kiran 2023 05:00) (73 - 98)  BP: 136/82 (15 Kiran 2023 05:00) (136/82 - 159/73)  RR: 18 (15 Kiran 2023 05:00) (18 - 18)  SpO2: 94% (15 Kiran 2023 05:00) (94% - 97%)    Parameters below as of 14 Jan 2023 09:18  Patient On (Oxygen Delivery Method): room air    General: No acute distress  HEENT: EOM intact, visual fields full  Abdomen: Soft, nontender, nondistended   Extremities: No edema    NEUROLOGICAL EXAM:  Mental status: Awake, alert, oriented x3, expressive aphasia  Cranial Nerves: No facial asymmetry, no nystagmus, no dysarthria,  tongue midline  Motor exam: Normal tone, no drift, 5/5 RUE, 5/5 RLE, 5/5 LUE, 5/5 LLE, normal fine finger movements.  Sensation: Intact to light touch   Coordination/ Gait: No dysmetria, SALBADOR intact and symmetric bilaterally    LABS:                        14.7   5.44  )-----------( 267      ( 12 Jan 2023 05:39 )             44.8    01-12    136  |  99  |  12  ----------------------------<  113<H>  4.1   |  24  |  1.05    Ca    9.8      12 Jan 2023 05:38  Phos  4.6     01-12  Mg     2.2     01-12    TPro  8.2  /  Alb  4.0  /  TBili  0.5  /  DBili  x   /  AST  30  /  ALT  36  /  AlkPhos  83  01-11      IMAGING: Reviewed by me.   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   Significant stenosis of the basilar artery. The posterior communicating   arteries help supply the posterior circulation    01/11/23:  CT BRAIN WITHOUT CONTRAST:  1. Subtle decreased attenuation involving the left parietal brain   parenchyma which may represent an acute to subacute ischemic event. No   acute intracranial hemorrhage  2. Wedge-shaped defect in a right frontal location, compatible with an   old infarct.    CTA COW:  1. Hypoplastic right A1 segment. Bilateral A2 segment appear to receive   vascular flow from the left A1 segment.  2. Short segment high-grade stenosis of the distal aspect of the   intracranial right vertebral artery.  3. Short segment stenosis within the midportion of the basilar artery.  4. Fetal origins of the bilateral posterior cerebral arteries.  5. Question stenosis versus occlusion involving a distal M2 branch of the   left middle cerebral artery.    CTA NECK: Patent bilateral common carotid arteries and bilateral internal   carotid arteries. No hemodynamically significant stenosis at the  ICA   origins based on NASCET criteria.  Bilateral vertebral arteries are patent without flow limiting stenosis.   Mild dominance of the left vertebral artery.         THE PATIENT WAS SEEN AND EXAMINED BY ME WITH THE HOUSESTAFF AND STROKE TEAM DURING MORNING ROUNDS.   HPI:  77y (15-Mj-1945) woman with HTN presenting with expressive aphasia since 1/8. Daughter at bedside reports finding the patient in her bedroom. She was trying to tell her daughter that she had trouble speaking. She new what she wanted to say,  but cound't find the words to express herself. The daughter reports she had a similar episode in the past and was found to have a UTI. She was treated with antibiotics and symptoms resolved. She took her to urgent care since she thought this was a UTI and they prescribed her antibiotics. Patient did not improve with antibiotics, which prompted them to come to the ED. Patient is noncompliant with blood pressure medication. Denies weakness, numbness, headache. At baseline, patient occasionally requires assistance to ambulate, but refuses to use walker. She requires some assistance with ADLs.     NIHSS 4  mRS 2    SUBJECTIVE: nauseous overnight Zofran x1.       aspirin enteric coated 81 milliGRAM(s) Oral daily  atorvastatin 80 milliGRAM(s) Oral at bedtime  bisacodyl 5 milliGRAM(s) Oral every 12 hours PRN  enoxaparin Injectable 40 milliGRAM(s) SubCutaneous every 24 hours  polyethylene glycol 3350 17 Gram(s) Oral daily  senna 2 Tablet(s) Oral at bedtime    PHYSICAL EXAM:   Vital Signs Last 24 Hrs  T(C): 36.7 (15 Kiran 2023 10:26), Max: 37.1 (14 Jan 2023 14:14)  T(F): 98 (15 Kiran 2023 10:26), Max: 98.7 (14 Jan 2023 14:14)  HR: 80 (15 Kiran 2023 10:26) (73 - 98)  BP: 123/75 (15 Kiran 2023 10:26) (123/75 - 159/73)  RR: 18 (15 Kiran 2023 10:26) (18 - 18)  SpO2: 95% (15 Kiran 2023 10:26) (94% - 97%)    Parameters below as of 15 Kiran 2023 10:26  Patient On (Oxygen Delivery Method): room air    General: No acute distress  HEENT: EOM intact, visual fields full  Abdomen: Soft, nontender, nondistended   Extremities: No edema    NEUROLOGICAL EXAM:  Mental status:  Eyes open, awake, alert, oriented x3, expressive aphasia. She is able to follow one and two step commands. She was able to show two fingers and touch her right thumb to her ear.   Cranial Nerves: No facial asymmetry, no nystagmus, no dysarthria,  tongue midline  Motor exam: Normal tone, no drift, 5/5 RUE, 5/5 RLE, 5/5 LUE, 5/5 LLE, normal fine finger movements.  Sensation: Intact to light touch   Coordination/ Gait: No dysmetria, SALBADOR intact and symmetric bilaterally    LABS:                        14.7   5.44  )-----------( 267      ( 12 Jan 2023 05:39 )             44.8    01-12    136  |  99  |  12  ----------------------------<  113<H>  4.1   |  24  |  1.05    Ca    9.8      12 Jan 2023 05:38  Phos  4.6     01-12  Mg     2.2     01-12    TPro  8.2  /  Alb  4.0  /  TBili  0.5  /  DBili  x   /  AST  30  /  ALT  36  /  AlkPhos  83  01-11      IMAGING: Reviewed by me.   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   Significant stenosis of the basilar artery. The posterior communicating   arteries help supply the posterior circulation    01/11/23:  CT BRAIN WITHOUT CONTRAST:  1. Subtle decreased attenuation involving the left parietal brain   parenchyma which may represent an acute to subacute ischemic event. No   acute intracranial hemorrhage  2. Wedge-shaped defect in a right frontal location, compatible with an   old infarct.    CTA COW:  1. Hypoplastic right A1 segment. Bilateral A2 segment appear to receive   vascular flow from the left A1 segment.  2. Short segment high-grade stenosis of the distal aspect of the   intracranial right vertebral artery.  3. Short segment stenosis within the midportion of the basilar artery.  4. Fetal origins of the bilateral posterior cerebral arteries.  5. Question stenosis versus occlusion involving a distal M2 branch of the   left middle cerebral artery.    CTA NECK: Patent bilateral common carotid arteries and bilateral internal   carotid arteries. No hemodynamically significant stenosis at the  ICA   origins based on NASCET criteria.  Bilateral vertebral arteries are patent without flow limiting stenosis.   Mild dominance of the left vertebral artery.         THE PATIENT WAS SEEN AND EXAMINED BY ME WITH THE HOUSESTAFF AND STROKE TEAM DURING MORNING ROUNDS.   HPI:  77y (15-Mj-1945) woman with HTN presenting with expressive aphasia since 1/8. Daughter at bedside reports finding the patient in her bedroom. She was trying to tell her daughter that she had trouble speaking. She new what she wanted to say,  but cound't find the words to express herself. The daughter reports she had a similar episode in the past and was found to have a UTI. She was treated with antibiotics and symptoms resolved. She took her to urgent care since she thought this was a UTI and they prescribed her antibiotics. Patient did not improve with antibiotics, which prompted them to come to the ED. Patient is noncompliant with blood pressure medication. Denies weakness, numbness, headache. At baseline, patient occasionally requires assistance to ambulate, but refuses to use walker. She requires some assistance with ADLs.     NIHSS 4  mRS 2    SUBJECTIVE: nauseous overnight Zofran x1.       aspirin enteric coated 81 milliGRAM(s) Oral daily  atorvastatin 80 milliGRAM(s) Oral at bedtime  bisacodyl 5 milliGRAM(s) Oral every 12 hours PRN  enoxaparin Injectable 40 milliGRAM(s) SubCutaneous every 24 hours  polyethylene glycol 3350 17 Gram(s) Oral daily  senna 2 Tablet(s) Oral at bedtime    PHYSICAL EXAM:   Vital Signs Last 24 Hrs  T(C): 36.7 (15 Kiran 2023 10:26), Max: 37.1 (14 Jan 2023 14:14)  T(F): 98 (15 Kiran 2023 10:26), Max: 98.7 (14 Jan 2023 14:14)  HR: 80 (15 Kiran 2023 10:26) (73 - 98)  BP: 123/75 (15 Kiran 2023 10:26) (123/75 - 159/73)  RR: 18 (15 Kiran 2023 10:26) (18 - 18)  SpO2: 95% (15 Kiran 2023 10:26) (94% - 97%)    Parameters below as of 15 Kiran 2023 10:26  Patient On (Oxygen Delivery Method): room air    General: No acute distress  HEENT: EOM intact, visual fields full  Abdomen: Soft, nontender, nondistended   Extremities: No edema    NEUROLOGICAL EXAM:  Mental status:  Eyes open, awake, alert, oriented x3, expressive aphasia. She is able to follow one and two step commands. She was able to show two fingers and touch her right thumb to her ear.   Cranial Nerves: No facial asymmetry, no nystagmus, no dysarthria,  tongue midline  Motor exam: Normal tone, no drift, 5/5 RUE, 5/5 RLE, 5/5 LUE, 5/5 LLE, normal fine finger movements.  Sensation: Intact to light touch   Coordination/ Gait: No dysmetria, SALBADOR intact and symmetric bilaterally      LABS:  cret                        14.0   6.69  )-----------( 253      ( 15 Kiran 2023 14:15 )             42.9           PT/INR - ( 15 Kiran 2023 14:15 )   PT: 12.8 sec;   INR: 1.10 ratio      IMAGING: Reviewed by me.     Duplex of lower extremities 1/15/23  IMPRESSION:  No evidence of deep venous thrombosis in either lower extremity.    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   Significant stenosis of the basilar artery. The posterior communicating   arteries help supply the posterior circulation    01/11/23:  CT BRAIN WITHOUT CONTRAST:  1. Subtle decreased attenuation involving the left parietal brain   parenchyma which may represent an acute to subacute ischemic event. No   acute intracranial hemorrhage  2. Wedge-shaped defect in a right frontal location, compatible with an   old infarct.    CTA COW:  1. Hypoplastic right A1 segment. Bilateral A2 segment appear to receive   vascular flow from the left A1 segment.  2. Short segment high-grade stenosis of the distal aspect of the   intracranial right vertebral artery.  3. Short segment stenosis within the midportion of the basilar artery.  4. Fetal origins of the bilateral posterior cerebral arteries.  5. Question stenosis versus occlusion involving a distal M2 branch of the   left middle cerebral artery.    CTA NECK: Patent bilateral common carotid arteries and bilateral internal   carotid arteries. No hemodynamically significant stenosis at the  ICA   origins based on NASCET criteria.  Bilateral vertebral arteries are patent without flow limiting stenosis.   Mild dominance of the left vertebral artery.

## 2023-01-15 NOTE — PROGRESS NOTE ADULT - SUBJECTIVE AND OBJECTIVE BOX
Subjective: Patient seen and examined. No new events except as noted.     REVIEW OF SYSTEMS:    CONSTITUTIONAL: +weakness, fevers or chills  EYES/ENT: No visual changes;  No vertigo or throat pain   NECK: No pain or stiffness  RESPIRATORY: No cough, wheezing, hemoptysis; No shortness of breath  CARDIOVASCULAR: No chest pain or palpitations  GASTROINTESTINAL: No abdominal or epigastric pain. No nausea, vomiting, or hematemesis; No diarrhea or constipation. No melena or hematochezia.  GENITOURINARY: No dysuria, frequency or hematuria  NEUROLOGICAL: No numbness or weakness  SKIN: No itching, burning, rashes, or lesions   All other review of systems is negative unless indicated above.    MEDICATIONS:  MEDICATIONS  (STANDING):  aspirin enteric coated 81 milliGRAM(s) Oral daily  atorvastatin 80 milliGRAM(s) Oral at bedtime  clopidogrel Tablet 75 milliGRAM(s) Oral daily  enoxaparin Injectable 40 milliGRAM(s) SubCutaneous every 24 hours  polyethylene glycol 3350 17 Gram(s) Oral daily  senna 2 Tablet(s) Oral at bedtime      PHYSICAL EXAM:  T(C): 36.3 (01-15-23 @ 05:00), Max: 37.1 (01-14-23 @ 14:14)  HR: 80 (01-15-23 @ 05:00) (73 - 98)  BP: 136/82 (01-15-23 @ 05:00) (136/82 - 159/73)  RR: 18 (01-15-23 @ 05:00) (18 - 18)  SpO2: 94% (01-15-23 @ 05:00) (94% - 97%)  Wt(kg): --  I&O's Summary    14 Jan 2023 07:01  -  15 Kiran 2023 07:00  --------------------------------------------------------  IN: 900 mL / OUT: 0 mL / NET: 900 mL          Appearance: Normal	  HEENT: Normal oral mucosa, PERRL, EOMI	  Lymphatic: No lymphadenopathy , no edema  Cardiovascular: Normal S1 S2, No JVD, No murmurs , Peripheral pulses palpable 2+ bilaterally  Respiratory: Lungs clear to auscultation, normal effort 	  Gastrointestinal: Soft, Non-tender, + BS	  Skin: No rashes, No ecchymoses, No cyanosis, warm to touch  NEUROLOGICAL EXAM:  Mental status: Awake, alert, oriented x3, expressive aphasia  Cranial Nerves: No facial asymmetry, no nystagmus, no dysarthria,  tongue midline  Motor exam: Normal tone, no drift, 5/5 RUE, 5/5 RLE, 5/5 LUE, 5/5 LLE, normal fine finger movements.  Sensation: Intact to light touch   Coordination/ Gait: No dysmetria, SALBADOR intact and symmetric bilaterally  Ext: No edema      LABS:    CARDIAC MARKERS:                  proBNP:   Lipid Profile:   HgA1c:   TSH:             TELEMETRY: 	SR    ECG:  	  RADIOLOGY:   DIAGNOSTIC TESTING:  [ ] Echocardiogram:  [ ]  Catheterization:  [ ] Stress Test:    OTHER:

## 2023-01-15 NOTE — CHART NOTE - NSCHARTNOTEFT_GEN_A_CORE
Notified of a large hematoma located on the patients RLQ. As per daughter she noticed a small hematoma yesterday that has significantly increased in size today. As per daughter, the nurse's were giving Lovenox in that area. H/H 14/42.9. Lovenox and Plavix on hold. Pending a duplex of the lower extremities. Site is marked to monitor for extension. Nurse made aware not to give injections in that area and to rotate sites of injections for future use. Notified of a large hematoma located on the patients RLQ. As per daughter she noticed a small hematoma yesterday that has significantly increased in size today. As per daughter, the nurse's were giving Lovenox in that area. H/H 14/42.9. Lovenox and Plavix on hold. Pending a duplex of the lower extremities. Site is marked to monitor for extension.  Left chest wall also noted to have mild ecchymosis where loop recorder was placed. No signs of active bleeding. Nurse made aware not to give injections in that area and to rotate sites of injections for future use.

## 2023-01-15 NOTE — PROGRESS NOTE ADULT - ASSESSMENT
Patient BRANDON BUCK is a 77y  woman with HTN presenting with expressive aphasia since 1/8/23.    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 Continue close monitoring for neurologic deterioration, permissive HTN to gradual normotension, : started on high dose statin, MRI Brain w/o, MRA Head w/o noted above. Physical therapy/OT: AR    ANTITHROMBOTIC THERAPY: ASA and plavix for 3 months per [facility]    PULMONARY: CXR clear (1/11), protecting airway, saturating well     CARDIOVASCULAR: TTE shows EF of 70%, mild AS, basal septal hypertrophy hyperdynamic LV. Continue with cardiac monitoring. Will need ILR placement to monitor long term for afib.                             SBP goal: Permissive HTN to gradual normotension     GASTROINTESTINAL:  dysphagia screen- passed, tolerating diet      Diet: Regular    RENAL: BUN/Cr stable, good urine output      Na Goal: Greater than 135     Aguilar: No    HEMATOLOGY: H/H stable, Platelets 267     DVT ppx: lovenox     ID: afebrile, no leukocytosis     OTHER: Plan of care discussed with patient at bedside.    DISPOSITION: AR once stable and workup is complete    CORE MEASURES:        Admission NIHSS: 4     TPA: [] YES [x] NO      LDL/HDL: 133/36     Depression Screen: 0     Statin Therapy: Yes      Dysphagia Screen: [X] PASS     Smoking  [X] NO      Afib  [X] NO     Stroke Education [X] YES    Obtain screening lower extremity venous ultrasound in patients who meet 1 or more of the following criteria as patient is high risk for DVT/PE on admission:   [] History of DVT/PE  []Hypercoagulable states (Factor V Leiden, Cancer, OCP, etc. )  []Prolonged immobility (hemiplegia/hemiparesis/post operative or any other extended immobilization)  [] Transferred from outside facility (Rehab or Long term care)  [] Age </= to 50.   Patient BRANDON BUCK is a 77y  woman with HTN presenting with expressive aphasia since 1/8/23.    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 Continue close monitoring for neurologic deterioration, permissive HTN to gradual normotension, : continues on a  high dose statin, MRI Brain w/o, MRA Head w/o noted above. Physical therapy/OT: AR    ANTITHROMBOTIC THERAPY: ASA and plavix for 3 months per [facility].     PULMONARY: CXR clear (1/11), protecting airway, saturating well     CARDIOVASCULAR: TTE shows EF of 70%, mild AS, basal septal hypertrophy hyperdynamic LV. Continue with cardiac monitoring. Will need ILR placement to monitor long term for afib.                             SBP goal: Permissive HTN to gradual normotension     GASTROINTESTINAL:  dysphagia screen- passed, tolerating diet      Diet: Regular    RENAL: BUN/Cr stable, good urine output      Na Goal: Greater than 135     Aguilar: No    HEMATOLOGY: H/H stable, Platelets 267     DVT ppx: lovenox     ID: afebrile, no leukocytosis     OTHER: Plan of care discussed with patient at bedside.    DISPOSITION: AR once stable and workup is complete    CORE MEASURES:        Admission NIHSS: 4     TPA: [] YES [x] NO      LDL/HDL: 133/36     Depression Screen: 0     Statin Therapy: Yes      Dysphagia Screen: [X] PASS     Smoking  [X] NO      Afib  [X] NO     Stroke Education [X] YES    Obtain screening lower extremity venous ultrasound in patients who meet 1 or more of the following criteria as patient is high risk for DVT/PE on admission:   [] History of DVT/PE  []Hypercoagulable states (Factor V Leiden, Cancer, OCP, etc. )  []Prolonged immobility (hemiplegia/hemiparesis/post operative or any other extended immobilization)  [] Transferred from outside facility (Rehab or Long term care)  [] Age </= to 50.   Patient BRANDON BUCK is a 77y  woman with HTN presenting with expressive aphasia since 1/8/23.    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 Continue close monitoring for neurologic deterioration, permissive HTN to gradual normotension, : continues on a  high dose statin, MRI Brain w/o, MRA Head w/o noted above. Physical therapy/OT: AR    ANTITHROMBOTIC THERAPY: ASA and plavix for 3 months per [facility].     PULMONARY: CXR clear (1/11), protecting airway, saturating well     CARDIOVASCULAR: TTE shows EF of 70%, mild AS, basal septal hypertrophy hyperdynamic LV. Continue with cardiac monitoring. Will need ILR placement to monitor long term for afib.                             SBP goal: Permissive HTN to gradual normotension. Loop recorder placed on 1/14/23?    GASTROINTESTINAL:  dysphagia screen- passed, tolerating diet      Diet: Regular    RENAL: BUN/Cr stable, good urine output      Na Goal: Greater than 135     Aguilar: No    HEMATOLOGY: H/H stable, Platelets 267     DVT ppx: lovenox     ID: afebrile, no leukocytosis     OTHER: Plan of care discussed with patient at bedside.    DISPOSITION: AR as per PT recss     CORE MEASURES:        Admission NIHSS: 4     TPA: [] YES [x] NO      LDL/HDL: 133/36     Depression Screen: 0     Statin Therapy: Yes      Dysphagia Screen:  PASS     Smoking NO      Afib  NO     Stroke Education  YES    Obtain screening lower extremity venous ultrasound in patients who meet 1 or more of the following criteria as patient is high risk for DVT/PE on admission:   [] History of DVT/PE  []Hypercoagulable states (Factor V Leiden, Cancer, OCP, etc. )  []Prolonged immobility (hemiplegia/hemiparesis/post operative or any other extended immobilization)  [] Transferred from outside facility (Rehab or Long term care)  [] Age </= to 50.   Patient BRANDON BUCK is a 77y  woman with HTN presenting with expressive aphasia since 1/8/23.    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 On 1/15 patient noted to have a large hematoma on her RLQ abdomen. Lovenox and Plavix on hold. Duplex of the lower extremities did not reveal thrombosis. Continue close monitoring for neurologic deterioration, permissive HTN to gradual normotension, : continues on a  high dose statin, MRI Brain w/o, MRA Head w/o noted above. Physical therapy/OT: AR.     ANTITHROMBOTIC THERAPY:  continue ASA discontinue plavix temporarily     PULMONARY: CXR clear (1/11), protecting airway, saturating well     CARDIOVASCULAR: TTE shows EF of 70%, mild AS, basal septal hypertrophy hyperdynamic LV. Continue with cardiac monitoring. Will need ILR placement to monitor long term for afib.                             SBP goal: Permissive HTN to gradual normotension. Loop recorder placed on 1/14/23    GASTROINTESTINAL:  dysphagia screen- passed, tolerating diet      Diet: Regular    RENAL: BUN/Cr stable, good urine output      Na Goal: Greater than 135     Aguilar: No    HEMATOLOGY: H/H 14/42.9, Platelets 267     DVT ppx: lovenox d/c on 1/15 for concern of hematoma     ID: afebrile, no leukocytosis     OTHER: Plan of care discussed with patient at bedside.    DISPOSITION: AR as per PT recss     CORE MEASURES:        Admission NIHSS: 4     TPA: [x] NO      LDL/HDL: 133/36     Depression Screen: 0     Statin Therapy: Yes      Dysphagia Screen:  PASS     Smoking NO      Afib  NO     Stroke Education  YES    Obtain screening lower extremity venous ultrasound in patients who meet 1 or more of the following criteria as patient is high risk for DVT/PE on admission:   [] History of DVT/PE  []Hypercoagulable states (Factor V Leiden, Cancer, OCP, etc. )  []Prolonged immobility (hemiplegia/hemiparesis/post operative or any other extended immobilization)  [] Transferred from outside facility (Rehab or Long term care)  [] Age </= to 50.

## 2023-01-16 LAB
SARS-COV-2 RNA SPEC QL NAA+PROBE: SIGNIFICANT CHANGE UP

## 2023-01-16 RX ORDER — CLOPIDOGREL BISULFATE 75 MG/1
75 TABLET, FILM COATED ORAL DAILY
Refills: 0 | Status: DISCONTINUED | OUTPATIENT
Start: 2023-01-16 | End: 2023-01-17

## 2023-01-16 RX ADMIN — CLOPIDOGREL BISULFATE 75 MILLIGRAM(S): 75 TABLET, FILM COATED ORAL at 17:21

## 2023-01-16 RX ADMIN — Medication 81 MILLIGRAM(S): at 11:07

## 2023-01-16 RX ADMIN — ATORVASTATIN CALCIUM 80 MILLIGRAM(S): 80 TABLET, FILM COATED ORAL at 21:28

## 2023-01-16 NOTE — PROGRESS NOTE ADULT - ASSESSMENT
Patient BRANDON BUCK is a 77y  woman with HTN presenting with expressive aphasia since 1/8/23.    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 On 1/15 patient noted to have a large hematoma on her RLQ abdomen. Lovenox and Plavix on hold. Duplex of the lower extremities did not reveal thrombosis. Continue close monitoring for neurologic deterioration, permissive HTN to gradual normotension, : continues on a  high dose statin, MRI Brain w/o, MRA Head w/o noted above. Physical therapy/OT: AR.     ANTITHROMBOTIC THERAPY:  continue ASA, plavix on hold temporarily     PULMONARY: CXR clear (1/11), protecting airway, saturating well     CARDIOVASCULAR: TTE shows EF of 70%, mild AS, basal septal hypertrophy hyperdynamic LV. Continue with cardiac monitoring. S/P ILR placement.                             SBP goal: Permissive HTN to gradual normotension. Loop recorder placed on 1/14/23    GASTROINTESTINAL:  dysphagia screen- passed, tolerating diet      Diet: Regular    RENAL: BUN/Cr stable, good urine output      Na Goal: Greater than 135     Aguilar: No    HEMATOLOGY: H/H 14/42.9, Platelets 253     DVT ppx: lovenox d/c on 1/15 for concern of hematoma     ID: afebrile, no leukocytosis     OTHER: Plan of care discussed with patient at bedside.    DISPOSITION: AR as per PT recss     CORE MEASURES:        Admission NIHSS: 4     TPA: [x] NO      LDL/HDL: 133/36     Depression Screen: 0     Statin Therapy: Yes      Dysphagia Screen:  PASS     Smoking NO      Afib  NO     Stroke Education  YES    Obtain screening lower extremity venous ultrasound in patients who meet 1 or more of the following criteria as patient is high risk for DVT/PE on admission:   [] History of DVT/PE  []Hypercoagulable states (Factor V Leiden, Cancer, OCP, etc. )  []Prolonged immobility (hemiplegia/hemiparesis/post operative or any other extended immobilization)  [] Transferred from outside facility (Rehab or Long term care)  [] Age </= to 50. Patient BRANDON BUCK is a 77y  woman with HTN presenting with expressive aphasia since 1/8/23.    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 On 1/15 patient noted to have a large hematoma on her RLQ abdomen. Lovenox and Plavix on hold. Duplex of the lower extremities did not reveal thrombosis. Continue close monitoring for neurologic deterioration, permissive HTN to gradual normotension, : continues on a  high dose statin, MRI Brain w/o, MRA Head w/o noted above. Physical therapy/OT: AR.     ANTITHROMBOTIC THERAPY:  continue ASA, plavix on hold temporarily     PULMONARY: CXR clear (1/11), protecting airway, saturating well     CARDIOVASCULAR: TTE shows EF of 70%, mild AS, basal septal hypertrophy hyperdynamic LV. Continue with cardiac monitoring. S/P ILR placement.                             SBP goal: Permissive HTN to gradual normotension. Loop recorder placed on 1/14/23.     GASTROINTESTINAL:  dysphagia screen- passed, tolerating diet      Diet: Regular    RENAL: BUN/Cr stable, good urine output      Na Goal: Greater than 135     Aguilar: No    HEMATOLOGY: H/H 14/42.9, Platelets 253     DVT ppx: lovenox d/c on 1/15 for concern of hematoma, continue with venodynes     ID: afebrile, no leukocytosis     OTHER: Plan of care discussed with patient and grandaughter at bedside.    DISPOSITION: AR once bed available    CORE MEASURES:        Admission NIHSS: 4     TPA: [x] NO      LDL/HDL: 133/36     Depression Screen: 0     Statin Therapy: Yes      Dysphagia Screen:  PASS     Smoking NO      Afib  NO     Stroke Education  YES    Obtain screening lower extremity venous ultrasound in patients who meet 1 or more of the following criteria as patient is high risk for DVT/PE on admission:   [] History of DVT/PE  []Hypercoagulable states (Factor V Leiden, Cancer, OCP, etc. )  []Prolonged immobility (hemiplegia/hemiparesis/post operative or any other extended immobilization)  [] Transferred from outside facility (Rehab or Long term care)  [] Age </= to 50. Patient BRANDON BUCK is a 77y  woman with HTN presenting with expressive aphasia since 1/8/23.    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 On 1/15 patient noted to have a large hematoma on her RLQ abdomen. Lovenox and Plavix on hold. Duplex of the lower extremities did not reveal thrombosis. Continue close monitoring for neurologic deterioration, permissive HTN to gradual normotension, : continues on a  high dose statin, MRI Brain w/o, MRA Head w/o noted above. Physical therapy/OT: AR.     ANTITHROMBOTIC THERAPY:  continue ASA and Plavix for 3 months per [facility]    PULMONARY: CXR clear (1/11), protecting airway, saturating well     CARDIOVASCULAR: TTE shows EF of 70%, mild AS, basal septal hypertrophy hyperdynamic LV. Continue with cardiac monitoring. S/P ILR placement.                             SBP goal: Permissive HTN to gradual normotension. Loop recorder placed on 1/14/23.     GASTROINTESTINAL:  dysphagia screen- passed, tolerating diet      Diet: Regular    RENAL: BUN/Cr stable, good urine output      Na Goal: Greater than 135     Aguilar: No    HEMATOLOGY: H/H 14/42.9, Platelets 253     DVT ppx: lovenox d/c on 1/15 for concern of hematoma, continue with venodynes     ID: afebrile, no leukocytosis     OTHER: Plan of care discussed with patient and granddaughter at bedside.    DISPOSITION: AR once bed available    CORE MEASURES:        Admission NIHSS: 4     TPA: [x] NO      LDL/HDL: 133/36     Depression Screen: 0     Statin Therapy: Yes      Dysphagia Screen:  PASS     Smoking NO      Afib  NO     Stroke Education  YES    Obtain screening lower extremity venous ultrasound in patients who meet 1 or more of the following criteria as patient is high risk for DVT/PE on admission:   [] History of DVT/PE  []Hypercoagulable states (Factor V Leiden, Cancer, OCP, etc. )  []Prolonged immobility (hemiplegia/hemiparesis/post operative or any other extended immobilization)  [] Transferred from outside facility (Rehab or Long term care)  [] Age </= to 50. Patient BRANDON BUCK is a 77y  woman with HTN presenting with expressive aphasia since 1/8/23.    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 On 1/15 patient noted to have a large hematoma on her RLQ abdomen no change since yesterday will resume Plavix.  Duplex of the lower extremities did not reveal thrombosis. Continue close monitoring for neurologic deterioration, permissive HTN to gradual normotension, : continues on a  high dose statin, MRI Brain w/o, MRA Head w/o noted above. Physical therapy/OT: AR.     ANTITHROMBOTIC THERAPY:  continue ASA and Plavix for 3 months per [facility] [place]    PULMONARY: CXR clear (1/11), protecting airway, saturating well     CARDIOVASCULAR: TTE shows EF of 70%, mild AS, basal septal hypertrophy hyperdynamic LV. Continue with cardiac monitoring. S/P ILR placement.                             SBP goal: Permissive HTN to gradual normotension. Loop recorder placed on 1/14/23.     GASTROINTESTINAL:  dysphagia screen- passed, tolerating diet      Diet: Regular    RENAL: BUN/Cr stable, good urine output      Na Goal: Greater than 135     Aguilar: No    HEMATOLOGY: H/H 14/42.9, Platelets 253     DVT ppx: lovenox d/c on 1/15 for concern of hematoma, continue with venodynes also patient ambulatory and advice to exercise legs    ID: afebrile, no leukocytosis     OTHER: Plan of care discussed with patient and granddaughter at bedside.    DISPOSITION: AR once bed available    CORE MEASURES:        Admission NIHSS: 4     TPA: [x] NO      LDL/HDL: 133/36     Depression Screen: 0     Statin Therapy: Yes      Dysphagia Screen:  PASS     Smoking NO      Afib  NO     Stroke Education  YES    Obtain screening lower extremity venous ultrasound in patients who meet 1 or more of the following criteria as patient is high risk for DVT/PE on admission:   [] History of DVT/PE  []Hypercoagulable states (Factor V Leiden, Cancer, OCP, etc. )  []Prolonged immobility (hemiplegia/hemiparesis/post operative or any other extended immobilization)  [] Transferred from outside facility (Rehab or Long term care)  [] Age </= to 50.

## 2023-01-16 NOTE — PROGRESS NOTE ADULT - SUBJECTIVE AND OBJECTIVE BOX
THE PATIENT WAS SEEN AND EXAMINED BY ME WITH THE HOUSESTAFF AND STROKE TEAM DURING MORNING ROUNDS.   HPI:  77y ([id number]-1945) woman with HTN presenting with expressive aphasia since 1/8. Daughter at bedside reports finding the patient in her bedroom. She was trying to tell her daughter that she had trouble speaking. She new what she wanted to say,  but cound't find the words to express herself. The daughter reports she had a similar episode in the past and was found to have a UTI. She was treated with antibiotics and symptoms resolved. She took her to urgent care since she thought this was a UTI and they prescribed her antibiotics. Patient did not improve with antibiotics, which prompted them to come to the ED. Patient is noncompliant with blood pressure medication. Denies weakness, numbness, headache. At baseline, patient occasionally requires assistance to ambulate, but refuses to use walker. She requires some assistance with ADLs.     NIHSS 4  mRS 2      SUBJECTIVE: No events overnight.  No new neurologic complaints.      aspirin enteric coated 81 milliGRAM(s) Oral daily  atorvastatin 80 milliGRAM(s) Oral at bedtime  bisacodyl 5 milliGRAM(s) Oral every 12 hours PRN  polyethylene glycol 3350 17 Gram(s) Oral daily  senna 2 Tablet(s) Oral at bedtime      PHYSICAL EXAM:   Vital Signs Last 24 Hrs  T(C): 36.5 (16 Jan 2023 05:00), Max: 37.1 (15 Kiran 2023 14:15)  T(F): 97.7 (16 Jan 2023 05:00), Max: 98.7 (15 Kiran 2023 14:15)  HR: 70 (16 Jan 2023 05:00) (70 - 80)  BP: 128/70 (16 Jan 2023 05:00) (123/75 - 155/75)  RR: 18 (16 Jan 2023 05:00) (17 - 18)  SpO2: 96% (16 Jan 2023 05:00) (95% - 98%)    Parameters below as of 16 Jan 2023 05:00  Patient On (Oxygen Delivery Method): room air        General: No acute distress  HEENT: EOM intact, visual fields full  Abdomen: Soft, nontender, nondistended   Extremities: No edema    NEUROLOGICAL EXAM:  Mental status:  Eyes open, awake, alert, oriented x3, expressive aphasia. She is able to follow one and two step commands. She was able to show two fingers and touch her right thumb to her ear.   Cranial Nerves: No facial asymmetry, no nystagmus, no dysarthria,  tongue midline  Motor exam: Normal tone, no drift, 5/5 RUE, 5/5 RLE, 5/5 LUE, 5/5 LLE, normal fine finger movements.  Sensation: Intact to light touch   Coordination/ Gait: No dysmetria, SALBADOR intact and symmetric bilaterally    LABS:                        14.0   6.69  )-----------( 253      ( 15 Kiran 2023 14:15 )             42.9        PT/INR - ( 15 Kiran 2023 14:15 )   PT: 12.8 sec;   INR: 1.10 ratio               IMAGING: Reviewed by me.   Duplex of lower extremities 1/15/23  IMPRESSION:  No evidence of deep venous thrombosis in either lower extremity.    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   Significant stenosis of the basilar artery. The posterior communicating   arteries help supply the posterior circulation    01/11/23:  CT BRAIN WITHOUT CONTRAST:  1. Subtle decreased attenuation involving the left parietal brain   parenchyma which may represent an acute to subacute ischemic event. No   acute intracranial hemorrhage  2. Wedge-shaped defect in a right frontal location, compatible with an   old infarct.    CTA COW:  1. Hypoplastic right A1 segment. Bilateral A2 segment appear to receive   vascular flow from the left A1 segment.  2. Short segment high-grade stenosis of the distal aspect of the   intracranial right vertebral artery.  3. Short segment stenosis within the midportion of the basilar artery.  4. Fetal origins of the bilateral posterior cerebral arteries.  5. Question stenosis versus occlusion involving a distal M2 branch of the   left middle cerebral artery.    CTA NECK: Patent bilateral common carotid arteries and bilateral internal   carotid arteries. No hemodynamically significant stenosis at the  ICA   origins based on NASCET criteria.  Bilateral vertebral arteries are patent without flow limiting stenosis.   Mild dominance of the left vertebral artery.   THE PATIENT WAS SEEN AND EXAMINED BY ME WITH THE HOUSESTAFF AND STROKE TEAM DURING MORNING ROUNDS.   HPI:  77y (15-Mj-1945) woman with HTN presenting with expressive aphasia since 1/8. Daughter at bedside reports finding the patient in her bedroom. She was trying to tell her daughter that she had trouble speaking. She new what she wanted to say,  but cound't find the words to express herself. The daughter reports she had a similar episode in the past and was found to have a UTI. She was treated with antibiotics and symptoms resolved. She took her to urgent care since she thought this was a UTI and they prescribed her antibiotics. Patient did not improve with antibiotics, which prompted them to come to the ED. Patient is noncompliant with blood pressure medication. Denies weakness, numbness, headache. At baseline, patient occasionally requires assistance to ambulate, but refuses to use walker. She requires some assistance with ADLs.     NIHSS 4  mRS 2    SUBJECTIVE: No events overnight.  No new neurologic complaints.      aspirin enteric coated 81 milliGRAM(s) Oral daily  atorvastatin 80 milliGRAM(s) Oral at bedtime  bisacodyl 5 milliGRAM(s) Oral every 12 hours PRN  polyethylene glycol 3350 17 Gram(s) Oral daily  senna 2 Tablet(s) Oral at bedtime      PHYSICAL EXAM:   Vital Signs Last 24 Hrs  T(C): 36.5 (16 Jan 2023 05:00), Max: 37.1 (15 Kiran 2023 14:15)  T(F): 97.7 (16 Jan 2023 05:00), Max: 98.7 (15 Kiran 2023 14:15)  HR: 70 (16 Jan 2023 05:00) (70 - 80)  BP: 128/70 (16 Jan 2023 05:00) (123/75 - 155/75)  RR: 18 (16 Jan 2023 05:00) (17 - 18)  SpO2: 96% (16 Jan 2023 05:00) (95% - 98%)    Parameters below as of 16 Jan 2023 05:00  Patient On (Oxygen Delivery Method): room air        General: No acute distress  HEENT: EOM intact, visual fields full  Abdomen: Soft, nontender, nondistended   Extremities: No edema    NEUROLOGICAL EXAM:  Mental status:  Eyes open, awake, alert, oriented x3, expressive aphasia. She is able to follow one and two step commands. She was able to show two fingers and touch her right thumb to her ear.   Cranial Nerves: No facial asymmetry, no nystagmus, no dysarthria,  tongue midline  Motor exam: Normal tone, no drift, 5/5 RUE, 5/5 RLE, 5/5 LUE, 5/5 LLE, normal fine finger movements.  Sensation: Intact to light touch   Coordination/ Gait: No dysmetria, SALBADOR intact and symmetric bilaterally    LABS:                        14.0   6.69  )-----------( 253      ( 15 Kiran 2023 14:15 )             42.9        PT/INR - ( 15 Kiran 2023 14:15 )   PT: 12.8 sec;   INR: 1.10 ratio               IMAGING: Reviewed by me.   Duplex of lower extremities 1/15/23  IMPRESSION:  No evidence of deep venous thrombosis in either lower extremity.    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   Significant stenosis of the basilar artery. The posterior communicating   arteries help supply the posterior circulation    01/11/23:  CT BRAIN WITHOUT CONTRAST:  1. Subtle decreased attenuation involving the left parietal brain   parenchyma which may represent an acute to subacute ischemic event. No   acute intracranial hemorrhage  2. Wedge-shaped defect in a right frontal location, compatible with an   old infarct.    CTA COW:  1. Hypoplastic right A1 segment. Bilateral A2 segment appear to receive   vascular flow from the left A1 segment.  2. Short segment high-grade stenosis of the distal aspect of the   intracranial right vertebral artery.  3. Short segment stenosis within the midportion of the basilar artery.  4. Fetal origins of the bilateral posterior cerebral arteries.  5. Question stenosis versus occlusion involving a distal M2 branch of the   left middle cerebral artery.    CTA NECK: Patent bilateral common carotid arteries and bilateral internal   carotid arteries. No hemodynamically significant stenosis at the  ICA   origins based on NASCET criteria.  Bilateral vertebral arteries are patent without flow limiting stenosis.   Mild dominance of the left vertebral artery.

## 2023-01-16 NOTE — PROGRESS NOTE ADULT - SUBJECTIVE AND OBJECTIVE BOX
Subjective: Patient seen and examined. No new events except as noted.   OOB at bedside.  Daughter at bedside.    REVIEW OF SYSTEMS:    CONSTITUTIONAL: + weakness, fevers or chills  EYES/ENT: No visual changes;  No vertigo or throat pain   NECK: No pain or stiffness  RESPIRATORY: No cough, wheezing, hemoptysis; No shortness of breath  CARDIOVASCULAR: No chest pain or palpitations  GASTROINTESTINAL: No abdominal or epigastric pain. No nausea, vomiting, or hematemesis; No diarrhea or constipation. No melena or hematochezia.  GENITOURINARY: No dysuria, frequency or hematuria  NEUROLOGICAL: No numbness or weakness  SKIN: No itching, burning, rashes, or lesions   All other review of systems is negative unless indicated above.    MEDICATIONS:  MEDICATIONS  (STANDING):  aspirin enteric coated 81 milliGRAM(s) Oral daily  atorvastatin 80 milliGRAM(s) Oral at bedtime  clopidogrel Tablet 75 milliGRAM(s) Oral daily  enoxaparin Injectable 40 milliGRAM(s) SubCutaneous every 24 hours  polyethylene glycol 3350 17 Gram(s) Oral daily  senna 2 Tablet(s) Oral at bedtime    PHYSICAL EXAM:  Vital Signs Last 24 Hrs  T(C): 36.3 (16 Jan 2023 08:59), Max: 37.1 (15 Kiran 2023 14:15)  T(F): 97.3 (16 Jan 2023 08:59), Max: 98.7 (15 Kiran 2023 14:15)  HR: 78 (16 Jan 2023 08:59) (70 - 80)  BP: 111/66 (16 Jan 2023 08:59) (111/66 - 155/75)  BP(mean): --  RR: 18 (16 Jan 2023 08:59) (17 - 18)  SpO2: 98% (16 Jan 2023 08:59) (95% - 98%)    Parameters below as of 16 Jan 2023 08:59  Patient On (Oxygen Delivery Method): room air    I&O's Summary    Appearance: Normal	  HEENT: Normal oral mucosa, PERRL, EOMI	  Lymphatic: No lymphadenopathy , no edema  Cardiovascular: Normal S1 S2, No JVD, No murmurs , Peripheral pulses palpable 2+ bilaterally  Respiratory: Lungs clear to auscultation, normal effort 	  Gastrointestinal: Soft, Non-tender, + BS	  Skin: No rashes, No ecchymoses, No cyanosis, warm to touch  NEUROLOGICAL EXAM:  Mental status: Awake, alert, oriented x3, expressive aphasia  Cranial Nerves: No facial asymmetry, no nystagmus, no dysarthria,  tongue midline  Motor exam: Normal tone, no drift, 5/5 RUE, 5/5 RLE, 5/5 LUE, 5/5 LLE, normal fine finger movements.  Sensation: Intact to light touch   Coordination/ Gait: No dysmetria, SALBADOR intact and symmetric bilaterally  Ext: No edema    LABS:    CARDIAC MARKERS:                        14.0   6.69  )-----------( 253      ( 15 Kiran 2023 14:15 )             42.9     proBNP:   Lipid Profile:   HgA1c:   TSH:     TELEMETRY: SR 65-90  ECG:  	  RADIOLOGY:   DIAGNOSTIC TESTING:  [ ] Echocardiogram:  [ ]  Catheterization:  [ ] Stress Test:    OTHER:

## 2023-01-17 ENCOUNTER — TRANSCRIPTION ENCOUNTER (OUTPATIENT)
Age: 78
End: 2023-01-17

## 2023-01-17 ENCOUNTER — INPATIENT (INPATIENT)
Facility: HOSPITAL | Age: 78
LOS: 13 days | Discharge: HOME HEALTH | DRG: 950 | End: 2023-01-31
Attending: PHYSICAL MEDICINE & REHABILITATION | Admitting: PHYSICAL MEDICINE & REHABILITATION
Payer: MEDICARE

## 2023-01-17 VITALS
TEMPERATURE: 99 F | SYSTOLIC BLOOD PRESSURE: 187 MMHG | RESPIRATION RATE: 14 BRPM | DIASTOLIC BLOOD PRESSURE: 93 MMHG | HEART RATE: 88 BPM | HEIGHT: 60 IN | OXYGEN SATURATION: 97 % | WEIGHT: 151.9 LBS

## 2023-01-17 VITALS
SYSTOLIC BLOOD PRESSURE: 145 MMHG | RESPIRATION RATE: 18 BRPM | OXYGEN SATURATION: 98 % | HEART RATE: 76 BPM | DIASTOLIC BLOOD PRESSURE: 76 MMHG | TEMPERATURE: 98 F

## 2023-01-17 DIAGNOSIS — I63.9 CEREBRAL INFARCTION, UNSPECIFIED: ICD-10-CM

## 2023-01-17 LAB
ANION GAP SERPL CALC-SCNC: 10 MMOL/L — SIGNIFICANT CHANGE UP (ref 5–17)
BUN SERPL-MCNC: 17 MG/DL — SIGNIFICANT CHANGE UP (ref 7–23)
CALCIUM SERPL-MCNC: 9.2 MG/DL — SIGNIFICANT CHANGE UP (ref 8.4–10.5)
CHLORIDE SERPL-SCNC: 105 MMOL/L — SIGNIFICANT CHANGE UP (ref 96–108)
CO2 SERPL-SCNC: 25 MMOL/L — SIGNIFICANT CHANGE UP (ref 22–31)
CREAT SERPL-MCNC: 0.98 MG/DL — SIGNIFICANT CHANGE UP (ref 0.5–1.3)
EGFR: 59 ML/MIN/1.73M2 — LOW
GLUCOSE SERPL-MCNC: 93 MG/DL — SIGNIFICANT CHANGE UP (ref 70–99)
HCT VFR BLD CALC: 41.7 % — SIGNIFICANT CHANGE UP (ref 34.5–45)
HGB BLD-MCNC: 14.1 G/DL — SIGNIFICANT CHANGE UP (ref 11.5–15.5)
MCHC RBC-ENTMCNC: 30.3 PG — SIGNIFICANT CHANGE UP (ref 27–34)
MCHC RBC-ENTMCNC: 33.8 GM/DL — SIGNIFICANT CHANGE UP (ref 32–36)
MCV RBC AUTO: 89.7 FL — SIGNIFICANT CHANGE UP (ref 80–100)
NRBC # BLD: 0 /100 WBCS — SIGNIFICANT CHANGE UP (ref 0–0)
PLATELET # BLD AUTO: 258 K/UL — SIGNIFICANT CHANGE UP (ref 150–400)
POTASSIUM SERPL-MCNC: 4.3 MMOL/L — SIGNIFICANT CHANGE UP (ref 3.5–5.3)
POTASSIUM SERPL-SCNC: 4.3 MMOL/L — SIGNIFICANT CHANGE UP (ref 3.5–5.3)
RBC # BLD: 4.65 M/UL — SIGNIFICANT CHANGE UP (ref 3.8–5.2)
RBC # FLD: 12.2 % — SIGNIFICANT CHANGE UP (ref 10.3–14.5)
SARS-COV-2 RNA SPEC QL NAA+PROBE: SIGNIFICANT CHANGE UP
SODIUM SERPL-SCNC: 140 MMOL/L — SIGNIFICANT CHANGE UP (ref 135–145)
WBC # BLD: 5.84 K/UL — SIGNIFICANT CHANGE UP (ref 3.8–10.5)
WBC # FLD AUTO: 5.84 K/UL — SIGNIFICANT CHANGE UP (ref 3.8–10.5)

## 2023-01-17 PROCEDURE — U0003: CPT

## 2023-01-17 PROCEDURE — 97116 GAIT TRAINING THERAPY: CPT

## 2023-01-17 PROCEDURE — 82565 ASSAY OF CREATININE: CPT

## 2023-01-17 PROCEDURE — 70450 CT HEAD/BRAIN W/O DYE: CPT | Mod: MA

## 2023-01-17 PROCEDURE — 85018 HEMOGLOBIN: CPT

## 2023-01-17 PROCEDURE — 70551 MRI BRAIN STEM W/O DYE: CPT | Mod: ME

## 2023-01-17 PROCEDURE — 85027 COMPLETE CBC AUTOMATED: CPT

## 2023-01-17 PROCEDURE — 70544 MR ANGIOGRAPHY HEAD W/O DYE: CPT

## 2023-01-17 PROCEDURE — 99232 SBSQ HOSP IP/OBS MODERATE 35: CPT

## 2023-01-17 PROCEDURE — 81001 URINALYSIS AUTO W/SCOPE: CPT

## 2023-01-17 PROCEDURE — 82435 ASSAY OF BLOOD CHLORIDE: CPT

## 2023-01-17 PROCEDURE — 86803 HEPATITIS C AB TEST: CPT

## 2023-01-17 PROCEDURE — 87637 SARSCOV2&INF A&B&RSV AMP PRB: CPT

## 2023-01-17 PROCEDURE — 87086 URINE CULTURE/COLONY COUNT: CPT

## 2023-01-17 PROCEDURE — 85610 PROTHROMBIN TIME: CPT

## 2023-01-17 PROCEDURE — 80048 BASIC METABOLIC PNL TOTAL CA: CPT

## 2023-01-17 PROCEDURE — 83036 HEMOGLOBIN GLYCOSYLATED A1C: CPT

## 2023-01-17 PROCEDURE — 80053 COMPREHEN METABOLIC PANEL: CPT

## 2023-01-17 PROCEDURE — 85576 BLOOD PLATELET AGGREGATION: CPT

## 2023-01-17 PROCEDURE — 36415 COLL VENOUS BLD VENIPUNCTURE: CPT

## 2023-01-17 PROCEDURE — 70498 CT ANGIOGRAPHY NECK: CPT | Mod: MA

## 2023-01-17 PROCEDURE — 80061 LIPID PANEL: CPT

## 2023-01-17 PROCEDURE — C8929: CPT

## 2023-01-17 PROCEDURE — 85014 HEMATOCRIT: CPT

## 2023-01-17 PROCEDURE — C1764: CPT

## 2023-01-17 PROCEDURE — 97530 THERAPEUTIC ACTIVITIES: CPT

## 2023-01-17 PROCEDURE — 71045 X-RAY EXAM CHEST 1 VIEW: CPT

## 2023-01-17 PROCEDURE — 99222 1ST HOSP IP/OBS MODERATE 55: CPT

## 2023-01-17 PROCEDURE — 97165 OT EVAL LOW COMPLEX 30 MIN: CPT

## 2023-01-17 PROCEDURE — 99285 EMERGENCY DEPT VISIT HI MDM: CPT

## 2023-01-17 PROCEDURE — 83735 ASSAY OF MAGNESIUM: CPT

## 2023-01-17 PROCEDURE — 82803 BLOOD GASES ANY COMBINATION: CPT

## 2023-01-17 PROCEDURE — 33285 INSJ SUBQ CAR RHYTHM MNTR: CPT

## 2023-01-17 PROCEDURE — 97162 PT EVAL MOD COMPLEX 30 MIN: CPT

## 2023-01-17 PROCEDURE — 84100 ASSAY OF PHOSPHORUS: CPT

## 2023-01-17 PROCEDURE — 87186 SC STD MICRODIL/AGAR DIL: CPT

## 2023-01-17 PROCEDURE — 82330 ASSAY OF CALCIUM: CPT

## 2023-01-17 PROCEDURE — 82947 ASSAY GLUCOSE BLOOD QUANT: CPT

## 2023-01-17 PROCEDURE — U0005: CPT

## 2023-01-17 PROCEDURE — 85025 COMPLETE CBC W/AUTO DIFF WBC: CPT

## 2023-01-17 PROCEDURE — 93970 EXTREMITY STUDY: CPT

## 2023-01-17 PROCEDURE — G1004: CPT

## 2023-01-17 PROCEDURE — 84484 ASSAY OF TROPONIN QUANT: CPT

## 2023-01-17 PROCEDURE — 84295 ASSAY OF SERUM SODIUM: CPT

## 2023-01-17 PROCEDURE — 92610 EVALUATE SWALLOWING FUNCTION: CPT

## 2023-01-17 PROCEDURE — 92523 SPEECH SOUND LANG COMPREHEN: CPT

## 2023-01-17 PROCEDURE — 83605 ASSAY OF LACTIC ACID: CPT

## 2023-01-17 PROCEDURE — 70496 CT ANGIOGRAPHY HEAD: CPT | Mod: MA

## 2023-01-17 PROCEDURE — 84132 ASSAY OF SERUM POTASSIUM: CPT

## 2023-01-17 RX ORDER — ASPIRIN/CALCIUM CARB/MAGNESIUM 324 MG
1 TABLET ORAL
Qty: 0 | Refills: 0 | DISCHARGE
Start: 2023-01-17

## 2023-01-17 RX ORDER — POLYETHYLENE GLYCOL 3350 17 G/17G
17 POWDER, FOR SOLUTION ORAL DAILY
Refills: 0 | Status: DISCONTINUED | OUTPATIENT
Start: 2023-01-18 | End: 2023-01-31

## 2023-01-17 RX ORDER — ATORVASTATIN CALCIUM 80 MG/1
80 TABLET, FILM COATED ORAL AT BEDTIME
Refills: 0 | Status: DISCONTINUED | OUTPATIENT
Start: 2023-01-17 | End: 2023-01-31

## 2023-01-17 RX ORDER — ATORVASTATIN CALCIUM 80 MG/1
1 TABLET, FILM COATED ORAL
Qty: 0 | Refills: 0 | DISCHARGE
Start: 2023-01-17

## 2023-01-17 RX ORDER — ASPIRIN/CALCIUM CARB/MAGNESIUM 324 MG
81 TABLET ORAL DAILY
Refills: 0 | Status: DISCONTINUED | OUTPATIENT
Start: 2023-01-18 | End: 2023-01-31

## 2023-01-17 RX ORDER — POLYETHYLENE GLYCOL 3350 17 G/17G
17 POWDER, FOR SOLUTION ORAL
Qty: 0 | Refills: 0 | DISCHARGE
Start: 2023-01-17

## 2023-01-17 RX ORDER — SENNA PLUS 8.6 MG/1
2 TABLET ORAL
Qty: 0 | Refills: 0 | DISCHARGE
Start: 2023-01-17

## 2023-01-17 RX ORDER — SENNA PLUS 8.6 MG/1
2 TABLET ORAL AT BEDTIME
Refills: 0 | Status: DISCONTINUED | OUTPATIENT
Start: 2023-01-17 | End: 2023-01-31

## 2023-01-17 RX ORDER — CLOPIDOGREL BISULFATE 75 MG/1
1 TABLET, FILM COATED ORAL
Qty: 0 | Refills: 0 | DISCHARGE
Start: 2023-01-17

## 2023-01-17 RX ORDER — CLOPIDOGREL BISULFATE 75 MG/1
75 TABLET, FILM COATED ORAL DAILY
Refills: 0 | Status: DISCONTINUED | OUTPATIENT
Start: 2023-01-18 | End: 2023-01-31

## 2023-01-17 RX ORDER — AMLODIPINE BESYLATE 2.5 MG/1
1 TABLET ORAL
Qty: 0 | Refills: 0 | DISCHARGE
Start: 2023-01-17

## 2023-01-17 RX ORDER — AMLODIPINE BESYLATE 2.5 MG/1
5 TABLET ORAL DAILY
Refills: 0 | Status: DISCONTINUED | OUTPATIENT
Start: 2023-01-17 | End: 2023-01-17

## 2023-01-17 RX ORDER — AMLODIPINE BESYLATE 2.5 MG/1
5 TABLET ORAL DAILY
Refills: 0 | Status: DISCONTINUED | OUTPATIENT
Start: 2023-01-17 | End: 2023-01-19

## 2023-01-17 RX ADMIN — CLOPIDOGREL BISULFATE 75 MILLIGRAM(S): 75 TABLET, FILM COATED ORAL at 11:40

## 2023-01-17 RX ADMIN — ATORVASTATIN CALCIUM 80 MILLIGRAM(S): 80 TABLET, FILM COATED ORAL at 21:21

## 2023-01-17 RX ADMIN — SENNA PLUS 2 TABLET(S): 8.6 TABLET ORAL at 21:22

## 2023-01-17 RX ADMIN — Medication 81 MILLIGRAM(S): at 11:40

## 2023-01-17 NOTE — H&P ADULT - NSHPSOCIALHISTORY_GEN_ALL_CORE
Smoking -  EtOH -   Drugs -       Patient lives In apt with grandchildren, 2 steps to enter  PTA: Independent in ADLs and ambulation     CURRENT FUNCTIONAL STATUS  Date: 1/17  Bed Mobility: CG  Transfers: Min A  Gait: CG, 40 ft with RW Smoking - Denies   EtOH - Denies   Drugs - Denies       Patient lives In apt with grandchildren, 2 steps to enter  PTA: Independent in ADLs and ambulation     CURRENT FUNCTIONAL STATUS  Date: 1/17  Bed Mobility: CG  Transfers: Min A  Gait: CG, 40 ft with RW

## 2023-01-17 NOTE — H&P ADULT - NSHPPHYSICALEXAM_GEN_ALL_CORE
Constitutional - NAD, Comfortable  HEENT - NCAT, EOMI  Chest - good chest expansion, good respiratory effort, CTAB   Cardio - warm and well perfused.  Abdomen -  Soft, NTND  Extremities - No peripheral edema, No calf tenderness   Neurologic Exam:                    Cognitive -             Orientation: Awake, Alert, AAO to self, place, date, year, situation            Attention:  Able to repeat. Able to recall 1/3 objects after 5 mins. Unable to do serial 7's.             Memory: Recent  memory intact            Thought: process, content appropriate     Speech - Expressive aphasia, Comprehensible, Mild dysarthria, able to repeat.      Cranial Nerves - No facial asymmetry, Tongue midline, Shoulder shrug intact     Motor -                      LEFT    UE - ShAB 5/5, EF 5/5, EE 5/5, WE 5/5,  WNL                    RIGHT UE - ShAB 5/5, EF 5/5, EE 5/5, WE 5/5,  WNL                    LEFT    LE - HF 5/5, KE 5/5, DF 5/5, PF 5/5                    RIGHT LE - HF 5/5, KE 5/5, DF 5/5, PF 5/5        Sensory - Intact to LT bilateral     Coordination - FTN  intact     OculoVestibular -  No nystagmus  Psychiatric - Mood stable, Affect WNL  Skin on admission: Large hematoma noted on the RLQ and as well on the left breast medial to the areola on the breast. Constitutional - NAD, Comfortable  HEENT - NCAT, EOMI  Chest - good chest expansion, good respiratory effort, CTAB + loop recorder site c/d/i.   Cardio - warm and well perfused.  Abdomen -  Soft, NTND  Extremities - No peripheral edema, No calf tenderness   Neurologic Exam:                    Cognitive -             Orientation: Awake, Alert, AAO to self, place, date, year, situation            Attention:  Able to repeat. Able to recall 1/3 objects after 5 mins. Unable to do serial 7's.             Memory: Recent  memory intact            Thought: process, content appropriate     Speech - Expressive aphasia, Comprehensible, Mild dysarthria, able to repeat.      Cranial Nerves - No facial asymmetry, Tongue midline, Shoulder shrug intact     Motor -                      LEFT    UE - ShAB 5/5, EF 5/5, EE 5/5, WE 5/5,  WNL                    RIGHT UE - ShAB 5/5, EF 5/5, EE 5/5, WE 5/5,  WNL                    LEFT    LE - HF 5/5, KE 5/5, DF 5/5, PF 5/5                    RIGHT LE - HF 5/5, KE 5/5, DF 5/5, PF 5/5        Sensory - Intact to LT bilateral     Coordination - FTN  intact     OculoVestibular -  No nystagmus  Psychiatric - Mood stable, Affect WNL  Skin on admission: Large hematoma noted on the RLQ and as well on the left breast medial to the areola on the breast. Constitutional - NAD, Comfortable  HEENT - NCAT, EOMI  Chest - good chest expansion, good respiratory effort, CTAB + loop recorder site c/d/i.   Cardio - warm and well perfused.  Abdomen -  Soft, NTND  Extremities - No peripheral edema, No calf tenderness   Neurologic Exam:                    Cognitive -             Orientation: Awake, Alert, AAO to self, place, date, year, situation            Attention:  Able to repeat. Able to recall 1/3 objects after 5 mins. Unable to do serial 7's.             Memory: Recent  memory intact            Thought: process, content appropriate     Speech - Expressive aphasia, Comprehensible, Mild dysarthria, able to repeat.      Cranial Nerves - No facial asymmetry, Tongue midline, Shoulder shrug intact     Motor -                      LEFT    UE - ShAB 5/5, EF 5/5, EE 5/5, WE 5/5,  WNL                    RIGHT UE - ShAB 5/5, EF 5/5, EE 5/5, WE 5/5,  WNL                    LEFT    LE - HF 4/5, KE 5/5, DF 5/5, PF 5/5                    RIGHT LE - HF 4/5, KE 5/5, DF 5/5, PF 5/5        Sensory - Intact to LT bilateral     Coordination - FTN  intact     OculoVestibular -  No nystagmus  Psychiatric - Mood stable, Affect WNL  Skin on admission: Large hematoma noted on the RLQ and as well on the left breast medial to the areola on the breast.

## 2023-01-17 NOTE — DISCHARGE NOTE PROVIDER - HOSPITAL COURSE
HPI:  77y (15-Mj-1945) woman with HTN presenting with expressive aphasia since 1/8. Daughter at bedside reports finding the patient in her bedroom. She was trying to tell her daughter that she had trouble speaking. She new what she wanted to say,  but cound't find the words to express herself. The daughter reports she had a similar episode in the past and was found to have a UTI. She was treated with antibiotics and symptoms resolved. She took her to urgent care since she thought this was a UTI and they prescribed her antibiotics. Patient did not improve with antibiotics, which prompted them to come to the ED. Patient is noncompliant with blood pressure medication. Denies weakness, numbness, headache. At baseline, patient occasionally requires assistance to ambulate, but refuses to use walker. She requires some assistance with ADLs. NIHSS 4, pre-mRS 2    Impression: aphasia due to acute L MCA infarct (L parietal) with L distal M2 occlusion. Mechanism ESUS/ICAD.     Hospital Course:  Neurologically improved in regards to her aphasia since admission. Pt started on ASA 81mg indefinitely and Plavix for 3 months per ALIN. Pt with a large hematoma on her RLQ abdomen no change since yesterday will resume Plavix.  Duplex of the lower extremities did .       Duplex of lower extremities 1/15/23  No evidence of deep venous thrombosis in either lower extremity.    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   Significant stenosis of the basilar artery. The posterior communicating   arteries help supply the posterior circulation    01/11/23:  CT BRAIN WITHOUT CONTRAST:  1. Subtle decreased attenuation involving the left parietal brain   parenchyma which may represent an acute to subacute ischemic event. No   acute intracranial hemorrhage  2. Wedge-shaped defect in a right frontal location, compatible with an   old infarct.    CTA COW:  1. Hypoplastic right A1 segment. Bilateral A2 segment appear to receive   vascular flow from the left A1 segment.  2. Short segment high-grade stenosis of the distal aspect of the   intracranial right vertebral artery.  3. Short segment stenosis within the midportion of the basilar artery.  4. Fetal origins of the bilateral posterior cerebral arteries.  5. Question stenosis versus occlusion involving a distal M2 branch of the   left middle cerebral artery.    CTA NECK: Patent bilateral common carotid arteries and bilateral internal   carotid arteries. No hemodynamically significant stenosis at the  ICA   origins based on NASCET criteria.  Bilateral vertebral arteries are patent without flow limiting stenosis.   Mild dominance of the left vertebral artery.    TTE 1/12 shows EF of 70%, mild AS, basal septal hypertrophy hyperdynamic LV HPI:  77y (15-Mj-1945) woman with HTN presenting with expressive aphasia since 1/8. Daughter at bedside reports finding the patient in her bedroom. She was trying to tell her daughter that she had trouble speaking. She new what she wanted to say,  but cound't find the words to express herself. The daughter reports she had a similar episode in the past and was found to have a UTI. She was treated with antibiotics and symptoms resolved. She took her to urgent care since she thought this was a UTI and they prescribed her antibiotics. Patient did not improve with antibiotics, which prompted them to come to the ED. Patient is noncompliant with blood pressure medication. Denies weakness, numbness, headache. At baseline, patient occasionally requires assistance to ambulate, but refuses to use walker. She requires some assistance with ADLs. NIHSS 4, pre-mRS 2    Impression: aphasia due to acute L MCA infarct (L parietal) with L distal M2 occlusion. Mechanism ESUS/ICAD. Chronic R frontal infarct    Hospital Course:  Neurologically improved in regards to her aphasia since admission. Pt started on ASA 81mg indefinitely and Plavix for 3 months per ALIN. Pt with a stable large hematoma on her RLQ abdomen so chemical DVT ppx not started. Duplex of the lower extremities neg.  and pt started on atorvastatin 80mg daily (titrate to LDL <70). Cardiology consulted, no HTN medications started. ILR placed on 1/13/23 to monitor for arrhythmias. Bowel regimen started.       Duplex of lower extremities 1/15/23  No evidence of deep venous thrombosis in either lower extremity.    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   Significant stenosis of the basilar artery. The posterior communicating   arteries help supply the posterior circulation    01/11/23:  CT BRAIN WITHOUT CONTRAST:  1. Subtle decreased attenuation involving the left parietal brain   parenchyma which may represent an acute to subacute ischemic event. No   acute intracranial hemorrhage  2. Wedge-shaped defect in a right frontal location, compatible with an   old infarct.    CTA COW:  1. Hypoplastic right A1 segment. Bilateral A2 segment appear to receive   vascular flow from the left A1 segment.  2. Short segment high-grade stenosis of the distal aspect of the   intracranial right vertebral artery.  3. Short segment stenosis within the midportion of the basilar artery.  4. Fetal origins of the bilateral posterior cerebral arteries.  5. Question stenosis versus occlusion involving a distal M2 branch of the   left middle cerebral artery.    CTA NECK: Patent bilateral common carotid arteries and bilateral internal   carotid arteries. No hemodynamically significant stenosis at the  ICA   origins based on NASCET criteria.  Bilateral vertebral arteries are patent without flow limiting stenosis.   Mild dominance of the left vertebral artery.    TTE 1/12 shows EF of 70%, mild AS, basal septal hypertrophy hyperdynamic LV HPI:  77y (15-Mj-1945) woman with HTN presenting with expressive aphasia since 1/8. Daughter at bedside reports finding the patient in her bedroom. She was trying to tell her daughter that she had trouble speaking. She new what she wanted to say,  but cound't find the words to express herself. The daughter reports she had a similar episode in the past and was found to have a UTI. She was treated with antibiotics and symptoms resolved. She took her to urgent care since she thought this was a UTI and they prescribed her antibiotics. Patient did not improve with antibiotics, which prompted them to come to the ED. Patient is noncompliant with blood pressure medication. Denies weakness, numbness, headache. At baseline, patient occasionally requires assistance to ambulate, but refuses to use walker. She requires some assistance with ADLs. NIHSS 4, pre-mRS 2    Impression: aphasia due to acute L MCA infarct (L parietal) with L distal M2 occlusion. Mechanism ESUS/ICAD. Chronic R frontal infarct    Hospital Course:  Neurologically improved in regards to her aphasia since admission. Pt started on ASA 81mg indefinitely and Plavix for 3 months per ALIN. Pt with a stable large hematoma on her RLQ abdomen so chemical DVT ppx not started. Duplex of the lower extremities neg.  and pt started on atorvastatin 80mg daily (titrate to LDL <70). Cardiology consulted, -160s and started on amlodipine 5mg daily. ILR placed on 1/13/23 to monitor for arrhythmias. Bowel regimen started.       Duplex of lower extremities 1/15/23  No evidence of deep venous thrombosis in either lower extremity.    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   Significant stenosis of the basilar artery. The posterior communicating   arteries help supply the posterior circulation    01/11/23:  CT BRAIN WITHOUT CONTRAST:  1. Subtle decreased attenuation involving the left parietal brain   parenchyma which may represent an acute to subacute ischemic event. No   acute intracranial hemorrhage  2. Wedge-shaped defect in a right frontal location, compatible with an   old infarct.    CTA COW:  1. Hypoplastic right A1 segment. Bilateral A2 segment appear to receive   vascular flow from the left A1 segment.  2. Short segment high-grade stenosis of the distal aspect of the   intracranial right vertebral artery.  3. Short segment stenosis within the midportion of the basilar artery.  4. Fetal origins of the bilateral posterior cerebral arteries.  5. Question stenosis versus occlusion involving a distal M2 branch of the   left middle cerebral artery.    CTA NECK: Patent bilateral common carotid arteries and bilateral internal   carotid arteries. No hemodynamically significant stenosis at the  ICA   origins based on NASCET criteria.  Bilateral vertebral arteries are patent without flow limiting stenosis.   Mild dominance of the left vertebral artery.    TTE 1/12 shows EF of 70%, mild AS, basal septal hypertrophy hyperdynamic LV

## 2023-01-17 NOTE — H&P ADULT - HISTORY OF PRESENT ILLNESS
This is a 76 YO  woman with PMH  of HTN who presented to [place]  on 1/11 with expressive aphasia since 1/8. Daughter found the patient in her bedroom. She was trying to tell her daughter that she had trouble speaking. She knew what she wanted to say,  but couldn't find the words to express herself. The daughter reports she had a similar episode in the past and was found to have a UTI. She was treated with antibiotics and symptoms resolved. She took her to urgent care since she thought this was a UTI and they prescribed her antibiotics. Patient did not improve with antibiotics, which prompted them to come to the ED. Patient is noncompliant with blood pressure medication.  At baseline, patient occasionally requires assistance to ambulate, but refuses to use walker. She requires some assistance with ADLs. NIHSS 4, pre-mRS 2. MRI showed Left parietal acute infarct.    Pt started on ASA 81mg indefinitely and Plavix for 3 months per ALIN. Pt with a stable large hematoma on her RLQ abdomen so chemical DVT ppx not started. Duplex of the lower extremities neg.  and pt started on atorvastatin 80mg daily (titrate to LDL <70). Cardiology consulted, -160s and started on amlodipine 5mg daily. ILR placed on 1/13/23 to monitor for arrhythmias.    Patient was evaluated by PM&R and therapy for functional deficits, gait/ADL impairments and acute rehabilitation was recommended. Patient was medically optimized for discharge to [facility] IRU on 1/17/2022.   This is a 77-year-old right-handed female patient with PMH  of HTN who presented to [place]  on 1/11 with expressive aphasia since 1/8. Daughter found the patient in her bedroom. She was trying to tell her daughter that she had trouble speaking. She knew what she wanted to say, but couldn't find the words to express herself. The daughter reports she had a similar episode in the past and was found to have a UTI. She was treated with antibiotics and symptoms resolved. She took her to urgent care since she thought this was a UTI and they prescribed her antibiotics. Patient did not improve with antibiotics, which prompted them to come to the ED. Patient is noncompliant with blood pressure medication.  At baseline, patient occasionally requires assistance to ambulate, but refuses to use walker. She requires some assistance with ADLs. NIHSS 4, pre-mRS 2. MRI showed Left parietal acute infarct.    Patient started on ASA 81mg indefinitely and Plavix for 3 months per ALIN. Patient with a stable large hematoma on her RLQ abdomen for which chemical DVT prophylaxis was not started. Duplex of the lower extremities neg.  and pt started on atorvastatin 80mg daily (titrate to LDL <70). Cardiology consulted, -160s and started on amlodipine 5mg daily. ILR placed on 1/13/23 to monitor for arrhythmias.    Patient was evaluated by PM&R and therapy for functional deficits, gait/ADL impairments and acute rehabilitation was recommended. Patient was medically optimized for discharge to [facility] IRU on 1/17/2022.

## 2023-01-17 NOTE — PROGRESS NOTE ADULT - PROBLEM SELECTOR PLAN 2
as above    - no events on tele thus far
as above   No events thus far on Tele
as above    - no events on tele thus far

## 2023-01-17 NOTE — DISCHARGE NOTE NURSING/CASE MANAGEMENT/SOCIAL WORK - PATIENT PORTAL LINK FT
You can access the FollowMyHealth Patient Portal offered by [facility] by registering at the following website: [email or web address]. By joining [facility]’s FollowMyHealth portal, you will also be able to view your health information using other applications (apps) compatible with our system.

## 2023-01-17 NOTE — H&P ADULT - NSHPREVIEWOFSYSTEMS_GEN_ALL_CORE
REVIEW OF SYSTEMS  Constitutional: No fever, No Chills, No fatigue  HEENT: No eye pain, No visual disturbances, No difficulty hearing  Pulm: No cough,  No shortness of breath  Cardio: No chest pain, No palpitations  GI:  No abdominal pain, No nausea, No vomiting, No diarrhea, No constipation  : No dysuria, No frequency, No hematuria  Neuro: No headaches, No memory loss, No loss of strength, No numbness, No tremors  Skin: No itching, No rashes, No lesions   Endo: No temperature intolerance  MSK: No joint pain, No joint swelling, No muscle pain, No Neck or back pain  Psych:  No depression, No anxiety Constitutional: No fever, No Chills, No fatigue  HEENT: No eye pain, No visual disturbances, No difficulty hearing  Pulm: No cough,  No shortness of breath  Cardio: No chest pain, No palpitations  GI:  No abdominal pain, No nausea, No vomiting, No diarrhea, No constipation  : No dysuria, No frequency, No hematuria  Neuro: No headaches, No memory loss, No loss of strength, No numbness, No tremors  Skin: No itching, No rashes, No lesions   Endo: No temperature intolerance  MSK: No joint pain, No joint swelling, No muscle pain, No Neck or back pain  Psych:  No depression, No anxiety

## 2023-01-17 NOTE — PROGRESS NOTE ADULT - REASON FOR ADMISSION
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

## 2023-01-17 NOTE — PROGRESS NOTE ADULT - TIME BILLING
Advanced care planning was discussed with patient and family.  Advanced care planning forms were reviewed and discussed as appropriate.  Differential diagnosis and plan of care discussed with patient after the evaluation.   Pain assessed and judicious use of narcotics when appropriate was discussed.  Importance of Fall prevention discussed.  Counseling on Smoking and Alcohol cessation was offered when appropriate.  Counseling on Diet, exercise, and medication compliance was done.
Advanced care planning was discussed with patient and family.  Advanced care planning forms were reviewed and discussed as appropriate.  Differential diagnosis and plan of care discussed with patient after the evaluation.   Pain assessed and judicious use of narcotics when appropriate was discussed.  Importance of Fall prevention discussed.  Counseling on Smoking and Alcohol cessation was offered when appropriate.  Counseling on Diet, exercise, and medication compliance was done.
See detailed plan as stated above
Agree with detailed plan as stated above.
Advanced care planning was discussed with patient and family.  Advanced care planning forms were reviewed and discussed as appropriate.  Differential diagnosis and plan of care discussed with patient after the evaluation.   Pain assessed and judicious use of narcotics when appropriate was discussed.  Importance of Fall prevention discussed.  Counseling on Smoking and Alcohol cessation was offered when appropriate.  Counseling on Diet, exercise, and medication compliance was done.

## 2023-01-17 NOTE — DISCHARGE NOTE PROVIDER - CARE PROVIDER_API CALL
Reza Kemp (DO)  Neurology; Vascular Neurology  [address], [address]  [place] [address]  Phone: [phone]  Fax: [phone]  Follow Up Time: 1 month   Reza Kemp (DO)  Neurology; Vascular Neurology  [address], [address]  [place] [address]  Phone: [phone]  Fax: [phone]  Follow Up Time: 1 month    Michael Kaur (DO)  Cardiology; Internal Medicine  [address], [address]  [place] [address]  Phone: [phone]  Fax: [phone]  Follow Up Time: 1 month

## 2023-01-17 NOTE — DISCHARGE NOTE PROVIDER - PROVIDER TOKENS
PROVIDER:[TOKEN:[[id number]],FOLLOWUP:[1 month]] PROVIDER:[TOKEN:[[id number]],FOLLOWUP:[1 month]],PROVIDER:[TOKEN:[[id number]],FOLLOWUP:[1 month]]

## 2023-01-17 NOTE — PROGRESS NOTE ADULT - SUBJECTIVE AND OBJECTIVE BOX
Subjective: Patient seen and examined. No new events except as noted.   OOB in chair.  Feels good.     REVIEW OF SYSTEMS:    CONSTITUTIONAL: + weakness, fevers or chills  EYES/ENT: No visual changes;  No vertigo or throat pain   NECK: No pain or stiffness  RESPIRATORY: No cough, wheezing, hemoptysis; No shortness of breath  CARDIOVASCULAR: No chest pain or palpitations  GASTROINTESTINAL: No abdominal or epigastric pain. No nausea, vomiting, or hematemesis; No diarrhea or constipation. No melena or hematochezia.  GENITOURINARY: No dysuria, frequency or hematuria  NEUROLOGICAL: No numbness or weakness  SKIN: No itching, burning, rashes, or lesions   All other review of systems is negative unless indicated above.    MEDICATIONS:  MEDICATIONS  (STANDING):  aspirin enteric coated 81 milliGRAM(s) Oral daily  atorvastatin 80 milliGRAM(s) Oral at bedtime  clopidogrel Tablet 75 milliGRAM(s) Oral daily  enoxaparin Injectable 40 milliGRAM(s) SubCutaneous every 24 hours  polyethylene glycol 3350 17 Gram(s) Oral daily  senna 2 Tablet(s) Oral at bedtime    PHYSICAL EXAM:  Vital Signs Last 24 Hrs  T(C): 36.6 (17 Jan 2023 09:10), Max: 37 (16 Jan 2023 13:55)  T(F): 97.9 (17 Jan 2023 09:10), Max: 98.6 (16 Jan 2023 13:55)  HR: 76 (17 Jan 2023 09:10) (64 - 80)  BP: 145/76 (17 Jan 2023 09:10) (145/76 - 162/84)  BP(mean): --  RR: 18 (17 Jan 2023 09:10) (16 - 18)  SpO2: 98% (17 Jan 2023 09:10) (93% - 98%)    Parameters below as of 17 Jan 2023 09:10  Patient On (Oxygen Delivery Method): room air    I&O's Summary    Appearance: Normal	  HEENT: Normal oral mucosa, PERRL, EOMI	  Lymphatic: No lymphadenopathy , no edema  Cardiovascular: Normal S1 S2, No JVD, No murmurs , Peripheral pulses palpable 2+ bilaterally  Respiratory: Lungs clear to auscultation, normal effort 	  Gastrointestinal: Soft, Non-tender, + BS	  Skin: No rashes, No ecchymoses, No cyanosis, warm to touch  NEUROLOGICAL EXAM:  Mental status:  Eyes open, awake, alert, oriented x3, expressive aphasia. She is able to follow one and two step commands. She was able to show two fingers and touch her right thumb to her ear.   Cranial Nerves: No facial asymmetry, no nystagmus, no dysarthria,  tongue midline  Motor exam: Normal tone, no drift, 5/5 RUE, 5/5 RLE, 5/5 LUE, 5/5 LLE, normal fine finger movements.  Sensation: Intact to light touch   Coordination/ Gait: No dysmetria, SALBADOR intact and symmetric bilaterally  Ext: No edema    LABS:    CARDIAC MARKERS:                        14.1   5.84  )-----------( 258      ( 17 Jan 2023 07:08 )             41.7     01-17    140  |  105  |  17  ----------------------------<  93  4.3   |  25  |  0.98    Ca    9.2      17 Jan 2023 07:06    proBNP:   Lipid Profile:   HgA1c:   TSH:     TELEMETRY: SR 70-80  ECG:  	  RADIOLOGY:   DIAGNOSTIC TESTING:  [ ] Echocardiogram:  [ ]  Catheterization:  [ ] Stress Test:    OTHER:

## 2023-01-17 NOTE — PROGRESS NOTE ADULT - PROBLEM SELECTOR PLAN 1
Left parietal acute infarct due to left M2 occlusion     - etiology ESUS  ASA/Plavix/Statin  TTE - EF 70%, mild AS  monitor on tele - for ILR as per Stroke recs  management as per stroke team
Suspicious for embolic etiology   For ILR today as per Stroke team   ASA/plavix and statin for now
Left parietal acute infarct due to left M2 occlusion     - etiology ESUS  ASA/Plavix/Statin  TTE - EF 70%, mild AS  monitor on tele - ILR  management as per stroke team

## 2023-01-17 NOTE — PROGRESS NOTE ADULT - PROBLEM SELECTOR PLAN 3
SBP - gradual normotension  stable on no meds  continue to monitor
SBP - gradual normotension  start amlodipine 5mg PO daily  continue to monitor
Stable   Low salt diet

## 2023-01-17 NOTE — PROGRESS NOTE ADULT - PROVIDER SPECIALTY LIST ADULT
Neurology
Cardiology
Electrophysiology
Neurology
Rehab Medicine
Neurology
Cardiology
Neurology
Neurology
Cardiology

## 2023-01-17 NOTE — H&P ADULT - NSHPLABSRESULTS_GEN_ALL_CORE
RECENT LABS/IMAGING                        14.1   5.84  )-----------( 258      ( 17 Jan 2023 07:08 )             41.7     01-17    140  |  105  |  17  ----------------------------<  93  4.3   |  25  |  0.98    Ca    9.2      17 Jan 2023 07:06      PT/INR - ( 15 Kiran 2023 14:15 )   PT: 12.8 sec;   INR: 1.10 ratio       < from: [facility] Duplex Lower Ext Vein Scan, Bilat (01.15.23 @ 16:45) >    IMPRESSION:  No evidence of deep venous thrombosis in either lower extremity.      MR Angio Head No Cont (01.12.23 @ 16:58) >    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   Significant stenosis of the basilar artery. The posterior communicating   arteries help supply the posterior circulation. Noninvasive flow MR   angiography as above.    CT Angio Neck w/ IV Cont (01.11.23 @ 16:47)     IMPRESSIONS:    CT BRAIN WITHOUT CONTRAST:  1. Subtle decreased attenuation involving the left parietal brain   parenchyma which may represent an acute to subacute ischemic event. No   acute intracranial hemorrhage  2. Wedge-shaped defect in a right frontal location, compatible with an   old infarct.      CTA COW:  1. Hypoplastic right A1 segment. Bilateral A2 segment appear to receive   vascular flow from the left A1 segment.  2. Short segment high-grade stenosis of the distal aspect of the   intracranial right vertebral artery.  3. Short segment stenosis within the midportion of the basilar artery.  4. Fetal origins of the bilateral posterior cerebral arteries.  5. Question stenosis versus occlusion involving a distal M2 branch of the   left middle cerebral artery.    CTA NECK: Patent bilateral common carotid arteries and bilateral internal   carotid arteries. No hemodynamically significant stenosis at the  ICA   origins based on NASCET criteria.  Bilateral vertebral arteries are patent without flow limiting stenosis.   Mild dominance of the left vertebral artery.

## 2023-01-17 NOTE — H&P ADULT - ASSESSMENT
ASSESSMENT/PLAN  This is a 78 YO  woman with PMH  of HTN who presented to [facility]  on 1/11 with expressive aphasia since 1/8. MRI showed Left parietal acute infarct. She was also found to have stable large hematoma on her RLQ abdomen. Patient now with gait Instability, ADL impairments and Functional impairments.    #CVA  - aphasia due to acute  Left parietal acute infarct  with L distal M2 occlusion. 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  - Start Comprehensive Rehab Program: PT/OT/ST, 3hours daily and 5 days weekly  - PT: Focused on improving strength, endurance, coordination, balance, functional mobility, and transfers  - OT: Focused on improving strength, fine motor skills, coordination, posture and ADLs.    - ST: to diagnose and treat deficits in swallowing, cognition and communication.   - c/w aspirin, plavix and high dose statin  -  ILR placed on 1/13/23     #HTN  - Amlodipine 5mg daily    #HLD  - Lipitor 80mg daily     #Hematoma  - stable large hematoma on her RLQ abdomen    #Pain management  - Tylenol PRN    #DVT ppx  - SCD, TEDs  - last dopplers: Negative for DVT on 1/15    #Bowel Regimen  - Senna, miralax     #Bladder management  - BS on admission, and q 8 hours (SC if > 400)  - Monitor UO    #FEN   - Diet: DASH     #Skin:  - Skin on admission: ***  - Pressure injury/Skin: Turn Q2hrs while in bed, OOB to Chair, PT/OT     #Dysphagia    - SLP: evaluation and treatment      #Sleep:   - Maintain quiet hours and low stim environment.  - Melatonin PRN to maximize participation in therapy during the day.     #Precaution  - Fall, Aspiration    #GOC  CODE STATUS: FULL CODE     Outpatient Follow-up (Specialty/Name of physician):  Reza Kemp (DO)  Neurology; Vascular Neurology  [address] [facility], [address]  [place] [address]  Phone: [phone]  Fax: [phone]  Follow Up Time: 1 month    Michael Kaur (DO)  Cardiology; Internal Medicine  [address], [address]  [place] [address]  Phone: [phone]  Fax: [phone]  Follow Up Time: 1 month      MEDICAL PROGNOSIS: GOOD            REHAB POTENTIAL: GOOD             ESTIMATED DISPOSITION: HOME WITH HOME CARE            ELOS: 10-14 Days   EXPECTED THERAPY:     P.T. 1hr/day       O.T. 1hr/day      S.L.P. 1hr/day     P&O Unnecessary     EXP FREQUENCY: 5 days per 7 day period     PRESCREEN COMPARISON:   I have reviewed the prescreen information and I have found no relevant changes between the preadmission screening and my post admission evaluation     RATIONALE FOR INPATIENT ADMISSION - Patient demonstrates the following: (check all that apply)  [X] Medically appropriate for rehabilitation admission  [X] Has attainable rehab goals with an appropriate initial discharge plan  [X] Has rehabilitation potential (expected to make a significant improvement within a reasonable period of time)   [X] Requires close medical management by a rehab physician, rehab nursing care, Hospitalist and comprehensive interdisciplinary team (including PT, OT, & or SLP, Prosthetics and Orthotics)   ASSESSMENT/PLAN  This is a 78 YO  woman with PMH  of HTN who presented to [facility]  on 1/11 with expressive aphasia since 1/8. MRI showed Left parietal acute infarct. She was also found to have stable large hematoma on her RLQ abdomen. Patient now with gait Instability, ADL impairments and Functional impairments.    #CVA  - aphasia due to acute  Left parietal acute infarct  with L distal M2 occlusion. 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  - Start Comprehensive Rehab Program: PT/OT/ST, 3hours daily and 5 days weekly  - PT: Focused on improving strength, endurance, coordination, balance, functional mobility, and transfers  - OT: Focused on improving strength, fine motor skills, coordination, posture and ADLs.    - ST: to diagnose and treat deficits in swallowing, cognition and communication.   - c/w aspirin, plavix and high dose statin  -  ILR placed on 1/13/23     #HTN  - Amlodipine 5mg daily    #HLD  - Lipitor 80mg daily     #Hematoma  - stable large hematoma on her RLQ abdomen and the Left Breast.     #Pain management  - Tylenol PRN    #DVT ppx  - SCD, TEDs  - last dopplers: Negative for DVT on 1/15    #Bowel Regimen  - Senna, miralax     #Bladder management  - BS on admission, and q 8 hours (SC if > 400)  - Monitor UO    #FEN   - Diet: DASH     #Skin:  - Skin on admission: Large hematoma on her RLQ abdomen and the Left Breast.   - Pressure injury/Skin: Turn Q2hrs while in bed, OOB to Chair, PT/OT     #Dysphagia    - SLP: evaluation and treatment      #Sleep:   - Maintain quiet hours and low stim environment.  - Melatonin PRN to maximize participation in therapy during the day.     #Precaution  - Fall, Aspiration    #GOC  CODE STATUS: FULL CODE     Outpatient Follow-up (Specialty/Name of physician):  Reza Kemp (DO)  Neurology; Vascular Neurology  [address] [facility], [address]  [place] [address]  Phone: [phone]  Fax: [phone]  Follow Up Time: 1 month    Michael Kaur (DO)  Cardiology; Internal Medicine  [address] [facility], [address]  [place] [address]  Phone: [phone]  Fax: [phone]  Follow Up Time: 1 month      MEDICAL PROGNOSIS: GOOD            REHAB POTENTIAL: GOOD             ESTIMATED DISPOSITION: HOME WITH HOME CARE            ELOS: 10-14 Days   EXPECTED THERAPY:     P.T. 1hr/day       O.T. 1hr/day      S.L.P. 1hr/day     P&O Unnecessary     EXP FREQUENCY: 5 days per 7 day period     PRESCREEN COMPARISON:   I have reviewed the prescreen information and I have found no relevant changes between the preadmission screening and my post admission evaluation     RATIONALE FOR INPATIENT ADMISSION - Patient demonstrates the following: (check all that apply)  [X] Medically appropriate for rehabilitation admission  [X] Has attainable rehab goals with an appropriate initial discharge plan  [X] Has rehabilitation potential (expected to make a significant improvement within a reasonable period of time)   [X] Requires close medical management by a rehab physician, rehab nursing care, Hospitalist and comprehensive interdisciplinary team (including PT, OT, & or SLP, Prosthetics and Orthotics)   ASSESSMENT/PLAN  This is a 78 YO  woman with PMH  of HTN who presented to [facility]  on 1/11 with expressive aphasia since 1/8. MRI showed Left parietal acute infarct. She was also found to have stable large hematoma on her RLQ abdomen. Patient now with gait Instability, ADL impairments and Functional impairments.    #CVA  - aphasia due to acute  Left parietal acute infarct  with L distal M2 occlusion. 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  - Start Comprehensive Rehab Program: PT/OT/ST, 3hours daily and 5 days weekly  - PT: Focused on improving strength, endurance, coordination, balance, functional mobility, and transfers  - OT: Focused on improving strength, fine motor skills, coordination, posture and ADLs.    - ST: to diagnose and treat deficits in swallowing, cognition and communication.   - c/w aspirin, plavix and high dose statin  -  ILR placed on 1/13/23     #HTN  - Amlodipine 5mg daily    #HLD  - Lipitor 80mg daily     #Hematoma  - stable large hematoma on her RLQ abdomen and the Left Breast.   - Secondary to Lovenox injection on 1/15.     #Pain management  - Tylenol PRN    #DVT ppx  - SCD, TEDs  - last dopplers: Negative for DVT on 1/15    #Bowel Regimen  - Senna, miralax     #Bladder management  - BS on admission, and q 8 hours (SC if > 400)  - Monitor UO    #FEN   - Diet: DASH     #Skin:  - Skin on admission: Large hematoma on her RLQ abdomen and the Left Breast.   - Pressure injury/Skin: Turn Q2hrs while in bed, OOB to Chair, PT/OT     #Dysphagia    - SLP: evaluation and treatment      #Sleep:   - Maintain quiet hours and low stim environment.  - Melatonin PRN to maximize participation in therapy during the day.     #Precaution  - Fall, Aspiration    #GOC  CODE STATUS: FULL CODE     Outpatient Follow-up (Specialty/Name of physician):  Reza Kemp (DO)  Neurology; Vascular Neurology  [address] [facility], [address]  [place] [address]  Phone: [phone]  Fax: [phone]  Follow Up Time: 1 month    Michael Kaur (DO)  Cardiology; Internal Medicine  [address] [facility], [address]  [place] [address]  Phone: [phone]  Fax: [phone]  Follow Up Time: 1 month      MEDICAL PROGNOSIS: GOOD            REHAB POTENTIAL: GOOD             ESTIMATED DISPOSITION: HOME WITH HOME CARE            ELOS: 10-14 Days   EXPECTED THERAPY:     P.T. 1hr/day       O.T. 1hr/day      S.L.P. 1hr/day     P&O Unnecessary     EXP FREQUENCY: 5 days per 7 day period     PRESCREEN COMPARISON:   I have reviewed the prescreen information and I have found no relevant changes between the preadmission screening and my post admission evaluation     RATIONALE FOR INPATIENT ADMISSION - Patient demonstrates the following: (check all that apply)  [X] Medically appropriate for rehabilitation admission  [X] Has attainable rehab goals with an appropriate initial discharge plan  [X] Has rehabilitation potential (expected to make a significant improvement within a reasonable period of time)   [X] Requires close medical management by a rehab physician, rehab nursing care, Hospitalist and comprehensive interdisciplinary team (including PT, OT, & or SLP, Prosthetics and Orthotics)   ASSESSMENT/PLAN  This is a 76 YO  woman with PMH  of HTN who presented to [facility]  on 1/11 with expressive aphasia since 1/8. MRI showed Left parietal acute infarct. She was also found to have stable large hematoma on her RLQ abdomen. Patient now with gait Instability, ADL impairments and Functional impairments.    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  - aphasia  - Start Comprehensive Rehab Program: PT/OT/ST, 3hours daily and 5 days weekly  - PT: Focused on improving strength, endurance, coordination, balance, functional mobility, and transfers  - OT: Focused on improving strength, fine motor skills, coordination, posture and ADLs.    - ST: to diagnose and treat deficits in swallowing, cognition and communication.   - c/w aspirin, plavix and high dose statin  - ILR placed on 1/13/23     #HTN  - Amlodipine 5mg daily    #HLD  - Lipitor 80mg daily     #Hematoma  - stable hematoma on her RLQ abdomen and the Left Breast.   - Secondary to Lovenox injection on 1/15.     #Pain management  - Tylenol PRN    #DVT ppx  - SCD, TEDs  - last dopplers: Negative for DVT on 1/15    #Bowel Regimen  - Senna, miralax     #Bladder management  - BS on admission, and q 8 hours (SC if > 400)  - Monitor UO    #FEN   - Diet: DASH     #Skin:  - Skin on admission: Large hematoma on her RLQ abdomen and the Left Breast.   - Pressure injury/Skin: Turn Q2hrs while in bed, OOB to Chair, PT/OT     #Dysphagia    - SLP: evaluation and treatment      #Sleep:   - Maintain quiet hours and low stim environment.  - Melatonin PRN to maximize participation in therapy during the day.     #Precaution  - Fall, Aspiration    #GOC  CODE STATUS: FULL CODE     Outpatient Follow-up (Specialty/Name of physician):  Reza Kemp (DO)  Neurology; Vascular Neurology  [address] [facility]  [place] [address]  Phone: [phone]  Fax: [phone]  Follow Up Time: 1 month    Michael Kaur (DO)  Cardiology; Internal Medicine  [address]  [place] [address]  Phone: [phone]  Fax: [phone]  Follow Up Time: 1 month      MEDICAL PROGNOSIS: GOOD            REHAB POTENTIAL: GOOD             ESTIMATED DISPOSITION: HOME WITH HOME CARE            ELOS: 10-14 Days   EXPECTED THERAPY:     P.T. 1hr/day       O.T. 1hr/day      S.L.P. 1hr/day     P&O Unnecessary     EXP FREQUENCY: 5 days per 7 day period     PRESCREEN COMPARISON:   I have reviewed the prescreen information and I have found no relevant changes between the preadmission screening and my post admission evaluation     RATIONALE FOR INPATIENT ADMISSION - Patient demonstrates the following: (check all that apply)  [X] Medically appropriate for rehabilitation admission  [X] Has attainable rehab goals with an appropriate initial discharge plan  [X] Has rehabilitation potential (expected to make a significant improvement within a reasonable period of time)   [X] Requires close medical management by a rehab physician, rehab nursing care, Hospitalist and comprehensive interdisciplinary team (including PT, OT, & or SLP, Prosthetics and Orthotics)   ASSESSMENT/PLAN  This is a 76 YO  woman with PMH  of HTN who presented to [place] [facility]  on 1/11 with expressive aphasia since 1/8. MRI showed Left parietal acute infarct. She was also found to have stable large hematoma on her RLQ abdomen. Patient now with gait Instability, ADL impairments and Functional impairments.    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  - dysarthria  - dysphagia  - aphasia  - Start Comprehensive Rehab Program: PT/OT/ST, 3hours daily and 5 days weekly  - PT: Focused on improving strength, endurance, coordination, balance, functional mobility, and transfers  - OT: Focused on improving strength, fine motor skills, coordination, posture and ADLs.    - ST: to diagnose and treat deficits in swallowing, cognition and communication.   - c/w aspirin, plavix and high dose statin  - ILR placed on 1/13/23     #HTN  - Amlodipine 5mg daily    #HLD  - Lipitor 80mg daily     #Hematoma  - stable hematoma on her RLQ abdomen and the Left Breast.   - Secondary to Lovenox injection on 1/15.     #Pain management  - Tylenol PRN    #DVT ppx  - SCD, TEDs  - last dopplers: Negative for DVT on 1/15    #Bowel Regimen  - Senna, miralax     #Bladder management  - BS on admission, and q 8 hours (SC if > 400)  - Monitor UO    #FEN   - Diet: DASH     #Skin:  - Skin on admission: Large hematoma on her RLQ abdomen and the Left Breast.   - Pressure injury/Skin: Turn Q2hrs while in bed, OOB to Chair, PT/OT     #Dysphagia    - SLP: evaluation and treatment      #Sleep:   - Maintain quiet hours and low stim environment.  - Melatonin PRN to maximize participation in therapy during the day.     #Precaution  - Fall, Aspiration    #GOC  CODE STATUS: FULL CODE     Outpatient Follow-up (Specialty/Name of physician):  Reza Kemp (DO)  Neurology; Vascular Neurology  [address] [facility], [address]  [place] [address]  Phone: [phone]  Fax: [phone]  Follow Up Time: 1 month    Michael Kaur (DO)  Cardiology; Internal Medicine  [address] [facility]  [place] [address]  Phone: [phone]  Fax: [phone]  Follow Up Time: 1 month      MEDICAL PROGNOSIS: GOOD            REHAB POTENTIAL: GOOD             ESTIMATED DISPOSITION: HOME WITH HOME CARE            ELOS: 10-14 Days   EXPECTED THERAPY:     P.T. 1hr/day       O.T. 1hr/day      S.L.P. 1hr/day     P&O Unnecessary     EXP FREQUENCY: 5 days per 7 day period     PRESCREEN COMPARISON:   I have reviewed the prescreen information and I have found no relevant changes between the preadmission screening and my post admission evaluation     RATIONALE FOR INPATIENT ADMISSION - Patient demonstrates the following: (check all that apply)  [X] Medically appropriate for rehabilitation admission  [X] Has attainable rehab goals with an appropriate initial discharge plan  [X] Has rehabilitation potential (expected to make a significant improvement within a reasonable period of time)   [X] Requires close medical management by a rehab physician, rehab nursing care, Hospitalist and comprehensive interdisciplinary team (including PT, OT, & or SLP, Prosthetics and Orthotics)

## 2023-01-17 NOTE — PROGRESS NOTE ADULT - SUBJECTIVE AND OBJECTIVE BOX
no new complaints  daughter at bedside     REVIEW OF SYSTEMS  Constitutional - No fever,  No fatigue  HEENT - No vertigo, No neck pain  Neurological - No headaches, No memory loss, No loss of strength, No numbness, No tremors  Skin - No rashes, No lesions   Musculoskeletal - No joint pain, No joint swelling, No muscle pain  Psychiatric - No depression, No anxiety    FUNCTIONAL PROGRESS  1/17 PT  bed mobility min assist   transfers min assist with RW  gait CG with RW x 40 feet     VITALS  T(C): 36.6 (01-17-23 @ 09:10), Max: 37 (01-16-23 @ 13:55)  HR: 76 (01-17-23 @ 09:10) (64 - 80)  BP: 145/76 (01-17-23 @ 09:10) (145/76 - 162/84)  RR: 18 (01-17-23 @ 09:10) (16 - 18)  SpO2: 98% (01-17-23 @ 09:10) (93% - 98%)  Wt(kg): --    MEDICATIONS   amLODIPine   Tablet 5 milliGRAM(s) daily  aspirin enteric coated 81 milliGRAM(s) daily  atorvastatin 80 milliGRAM(s) at bedtime  bisacodyl 5 milliGRAM(s) every 12 hours PRN  clopidogrel Tablet 75 milliGRAM(s) daily  polyethylene glycol 3350 17 Gram(s) daily  senna 2 Tablet(s) at bedtime      RECENT LABS - Reviewed                        14.1   5.84  )-----------( 258      ( 17 Jan 2023 07:08 )             41.7     01-17    140  |  105  |  17  ----------------------------<  93  4.3   |  25  |  0.98    Ca    9.2      17 Jan 2023 07:06      PT/INR - ( 15 Kiran 2023 14:15 )   PT: 12.8 sec;   INR: 1.10 ratio         -------------------------------------------------------------------------  PHYSICAL EXAM  Constitutional - NAD, Comfortable, in chair   Chest - Breathing comfortably, room air   Cardiovascular - S1S2   Abdomen - Soft   Extremities - No C/C/E, No calf tenderness   Neurologic Exam -      follows commands               Cognitive - Awake, Alert, AAO to self, place: hospital, month: january     Communication - +aphasia, hypophonic         Motor - 5/5     Sensory - Intact to LT     Psychiatric - Mood stable, Affect WNL  ----------------------------------------------------------------------------------------  ASSESSMENT/PLAN  77yFemale h/o HTN with functional deficits after CVA  MRI with left parietal infarct  Pain - Tylenol  DVT PPX - SCDs lovenox   Rehab - Will continue to follow for ongoing rehab needs and recommendations.   continue bedside therapy, PT/OT/SLP    Recommend ACUTE inpatient rehabilitation for the functional deficits consisting of 3 hours of therapy/day & 24 hour RN/daily PMR physician for comorbid medical management. Patient will be able to tolerate 3 hours a day.

## 2023-01-17 NOTE — H&P ADULT - ATTENDING COMMENTS
Patient was seen and evaluated alongside Resident Physician. A thorough discussion with the patient's daughter and granddaughter took place regarding admitting diagnosis, plan of care, and evaluation process. They expressed understanding and agreement.

## 2023-01-17 NOTE — DISCHARGE NOTE PROVIDER - NSDCCPCAREPLAN_GEN_ALL_CORE_FT
PRINCIPAL DISCHARGE DIAGNOSIS  Diagnosis: Stroke  Assessment and Plan of Treatment: You had difficulty talking due to a stroke. You will need to take new blood thinner medications and a cholesterol lowering medication. You also had a device placed to monitor your heart rhythms. You will need to follow closely with your neurologist and cardiologist.

## 2023-01-17 NOTE — PATIENT PROFILE ADULT - FALL HARM RISK - HARM RISK INTERVENTIONS
Assistance with ambulation/Assistance OOB with selected safe patient handling equipment/Communicate Risk of Fall with Harm to all staff/Discuss with provider need for PT consult/Monitor gait and stability/Provide patient with walking aids - walker, cane, crutches/Reinforce activity limits and safety measures with patient and family/Sit up slowly, dangle for a short time, stand at bedside before walking/Tailored Fall Risk Interventions/Use of alarms - bed, chair and/or voice tab/Visual Cue: Yellow wristband and red socks/Bed in lowest position, wheels locked, appropriate side rails in place/Call bell, personal items and telephone in reach/Instruct patient to call for assistance before getting out of bed or chair/Non-slip footwear when patient is out of bed/[place] to call system/Physically safe environment - no spills, clutter or unnecessary equipment/Purposeful Proactive Rounding/Room/bathroom lighting operational, light cord in reach

## 2023-01-17 NOTE — PROGRESS NOTE ADULT - ASSESSMENT
Patient BRANDON BUCK is a 77y  woman with HTN presenting with expressive aphasia since 1/8/23.    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 On 1/15/23 patient noted to have a large hematoma on her RLQ abdomen, was unchanged for 24 hours and plavix was restarted.   Duplex of the lower extremities did not reveal thrombosis. Continue close monitoring for neurologic deterioration, permissive HTN to gradual normotension, : continues on a  high dose statin, MRI Brain w/o, MRA Head w/o noted above. Physical therapy/OT: AR.     ANTITHROMBOTIC THERAPY:  continue ASA and Plavix for 3 months per ALIN    PULMONARY: CXR clear (1/11/23), protecting airway, saturating well     CARDIOVASCULAR: TTE shows EF of 70%, mild AS, basal septal hypertrophy hyperdynamic LV. Continue with cardiac monitoring. S/P ILR placement.                             SBP goal: Permissive HTN to gradual normotension. Loop recorder placed on 1/14/23.     GASTROINTESTINAL:  dysphagia screen- passed, tolerating diet      Diet: Regular    RENAL: BUN/Cr stable, good urine output      Na Goal: Greater than 135     Aguilar: No    HEMATOLOGY: H/H 14/42.9, Platelets 253     DVT ppx: lovenox d/c on 1/15 for concern of hematoma, continue with venodynes also patient ambulatory and advised to exercise legs    ID: afebrile, no leukocytosis     OTHER: Plan of care discussed with patient and granddaughter at bedside.    DISPOSITION: AR once bed available    CORE MEASURES:        Admission NIHSS: 4     TPA: [x] NO      LDL/HDL: 133/36     Depression Screen: 0     Statin Therapy: Yes      Dysphagia Screen:  PASS     Smoking NO      Afib  NO     Stroke Education  YES Patient BRANDON BUCK is a 77y  woman with HTN presenting with expressive aphasia since 1/8/23.    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 On 1/15/23 patient noted to have a large hematoma on her RLQ abdomen, was unchanged for 24 hours and plavix was restarted.   Duplex of the lower extremities did not reveal thrombosis. Continue close monitoring for neurologic deterioration, permissive HTN to gradual normotension, : continues on a  high dose statin, MRI Brain w/o, MRA Head w/o noted above. Physical therapy/OT: AR.     ANTITHROMBOTIC THERAPY:  continue ASA and Plavix for 3 months per ALIN    PULMONARY: CXR clear (1/11/23), protecting airway, saturating well     CARDIOVASCULAR: TTE shows EF of 70%, mild AS, basal septal hypertrophy hyperdynamic LV. Continue with cardiac monitoring. S/P ILR placement.                             SBP goal: Permissive HTN to gradual normotension. Loop recorder placed on 1/14/23.     GASTROINTESTINAL:  dysphagia screen- passed, tolerating diet      Diet: Regular    RENAL: BUN/Cr stable, good urine output      Na Goal: Greater than 135     Aguilar: No    HEMATOLOGY: H/H 14.1/41.7 Platelets 253     DVT ppx: lovenox d/c on 1/15 for concern of hematoma, continue with venodynes also patient ambulatory and advised to exercise legs    ID: afebrile, no leukocytosis     OTHER: Plan of care discussed with patient and granddaughter at bedside.    DISPOSITION: AR once bed available    CORE MEASURES:        Admission NIHSS: 4     TPA: [x] NO      LDL/HDL: 133/36     Depression Screen: 0     Statin Therapy: Yes      Dysphagia Screen:  PASS     Smoking NO      Afib  NO     Stroke Education  YES Patient BRANDON BUCK is a 77y  woman with HTN presenting with expressive aphasia since 1/8/23.    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 On 1/15/23 patient noted to have a large hematoma on her RLQ abdomen, was unchanged for 24 hours and plavix was restarted on 1/16/23.   Duplex of the lower extremities did not reveal thrombosis. Continue close monitoring for neurologic deterioration, permissive HTN to gradual normotension, : continues on a  high dose statin, MRI Brain w/o, MRA Head w/o noted above. Physical therapy/OT: AR.     ANTITHROMBOTIC THERAPY:  continue ASA and Plavix for 3 months per TONEJOON    PULMONARY: CXR clear (1/11/23), protecting airway, saturating well     CARDIOVASCULAR: TTE shows EF of 70%, mild AS, basal septal hypertrophy hyperdynamic LV. Continue with cardiac monitoring. S/P ILR placement.                             SBP goal: Permissive HTN to gradual normotension. Loop recorder placed on 1/14/23.     GASTROINTESTINAL:  dysphagia screen- passed, tolerating diet      Diet: Regular    RENAL: BUN/Cr stable, good urine output      Na Goal: Greater than 135     Aguilar: No    HEMATOLOGY: H/H 14.1/41.7 Platelets 258     DVT ppx: lovenox d/c on 1/15 for concern of hematoma, continue with venodynes also patient ambulatory and advised to exercise legs    ID: afebrile, no leukocytosis     OTHER: Plan of care discussed with patient and granddaughter at bedside.    DISPOSITION: AR once bed available    CORE MEASURES:        Admission NIHSS: 4     TPA: [x] NO      LDL/HDL: 133/36     Depression Screen: 0     Statin Therapy: Yes      Dysphagia Screen:  PASS     Smoking NO      Afib  NO     Stroke Education  YES

## 2023-01-17 NOTE — DISCHARGE NOTE PROVIDER - NSDCMRMEDTOKEN_GEN_ALL_CORE_FT
aspirin 81 mg oral delayed release tablet: 1 tab(s) orally once a day  atorvastatin 80 mg oral tablet: 1 tab(s) orally once a day (at bedtime)  bisacodyl 5 mg oral delayed release tablet: 1 tab(s) orally every 12 hours, As needed, Constipation  clopidogrel 75 mg oral tablet: 1 tab(s) orally once a day  polyethylene glycol 3350 oral powder for reconstitution: 17 gram(s) orally once a day  senna leaf extract oral tablet: 2 tab(s) orally once a day (at bedtime)   amLODIPine 5 mg oral tablet: 1 tab(s) orally once a day  aspirin 81 mg oral delayed release tablet: 1 tab(s) orally once a day  atorvastatin 80 mg oral tablet: 1 tab(s) orally once a day (at bedtime)  bisacodyl 5 mg oral delayed release tablet: 1 tab(s) orally every 12 hours, As needed, Constipation  clopidogrel 75 mg oral tablet: 1 tab(s) orally once a day  polyethylene glycol 3350 oral powder for reconstitution: 17 gram(s) orally once a day  senna leaf extract oral tablet: 2 tab(s) orally once a day (at bedtime)

## 2023-01-17 NOTE — PROGRESS NOTE ADULT - SUBJECTIVE AND OBJECTIVE BOX
THE PATIENT WAS SEEN AND EXAMINED BY ME WITH THE HOUSESTAFF AND STROKE TEAM DURING MORNING ROUNDS.   HPI:   Patient BRANDON BUCK is a 77y ([id number]-1945) woman with HTN. She presented on 1/12/23 with expressive aphasia since 1/8/23. Daughter at bedside reports finding the patient in her bedroom. She was trying to tell her daughter that she had trouble speaking. She new what she wanted to say,  but couldn't find the words to express herself. The daughter reports she had a similar episode in the past and was found to have a UTI. She was treated with antibiotics and symptoms resolved. She took her to urgent care since she thought this was a UTI and they prescribed her antibiotics. Patient did not improve with antibiotics, which prompted them to come to the ED. Patient is noncompliant with blood pressure medication. Denies weakness, numbness, headache. At baseline, patient occasionally requires assistance to ambulate, but refuses to use walker. She requires some assistance with ADLs. On admission: NIHSS 4 mRS 2    ROS: Otherwise negative.    SUBJECTIVE: No events overnight.  No new neurologic complaints.      aspirin enteric coated 81 milliGRAM(s) Oral daily  atorvastatin 80 milliGRAM(s) Oral at bedtime  bisacodyl 5 milliGRAM(s) Oral every 12 hours PRN  clopidogrel Tablet 75 milliGRAM(s) Oral daily  polyethylene glycol 3350 17 Gram(s) Oral daily  senna 2 Tablet(s) Oral at bedtime      PHYSICAL EXAM:   Vital Signs Last 24 Hrs  T(C): 36.6 (17 Jan 2023 09:10), Max: 37 (16 Jan 2023 13:55)  T(F): 97.9 (17 Jan 2023 09:10), Max: 98.6 (16 Jan 2023 13:55)  HR: 76 (17 Jan 2023 09:10) (64 - 80)  BP: 145/76 (17 Jan 2023 09:10) (145/76 - 162/84)  RR: 18 (17 Jan 2023 09:10) (16 - 18)  SpO2: 98% (17 Jan 2023 09:10) (93% - 98%)    Parameters below as of 17 Jan 2023 09:10  Patient On (Oxygen Delivery Method): room air    General: No acute distress  HEENT: EOM intact, visual fields full  Abdomen: Soft, nontender, nondistended   Extremities: No edema    NEUROLOGICAL EXAM:  Mental status:  Eyes open, awake, alert, oriented x3, expressive aphasia. She is able to follow one and two step commands. She was able to show two fingers and touch her right thumb to her ear.   Cranial Nerves: No facial asymmetry, no nystagmus, no dysarthria,  tongue midline  Motor exam: Normal tone, no drift, 5/5 RUE, 5/5 RLE, 5/5 LUE, 5/5 LLE, normal fine finger movements.  Sensation: Intact to light touch   Coordination/ Gait: No dysmetria, SALBADOR intact and symmetric bilaterally    LABS:                        14.1   5.84  )-----------( 258      ( 17 Jan 2023 07:08 )             41.7    01-17    140  |  105  |  17  ----------------------------<  93  4.3   |  25  |  0.98    Ca    9.2      17 Jan 2023 07:06    PT/INR - ( 15 Kiran 2023 14:15 )   PT: 12.8 sec;   INR: 1.10 ratio      IMAGING: Reviewed by me.   Duplex of lower extremities 1/15/23  IMPRESSION:  No evidence of deep venous thrombosis in either lower extremity.    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   Significant stenosis of the basilar artery. The posterior communicating   arteries help supply the posterior circulation    01/11/23:  CT BRAIN WITHOUT CONTRAST:  1. Subtle decreased attenuation involving the left parietal brain   parenchyma which may represent an acute to subacute ischemic event. No   acute intracranial hemorrhage  2. Wedge-shaped defect in a right frontal location, compatible with an   old infarct.    CTA COW:  1. Hypoplastic right A1 segment. Bilateral A2 segment appear to receive   vascular flow from the left A1 segment.  2. Short segment high-grade stenosis of the distal aspect of the   intracranial right vertebral artery.  3. Short segment stenosis within the midportion of the basilar artery.  4. Fetal origins of the bilateral posterior cerebral arteries.  5. Question stenosis versus occlusion involving a distal M2 branch of the   left middle cerebral artery.    CTA NECK: Patent bilateral common carotid arteries and bilateral internal   carotid arteries. No hemodynamically significant stenosis at the  ICA   origins based on NASCET criteria.  Bilateral vertebral arteries are patent without flow limiting stenosis.   Mild dominance of the left vertebral artery.

## 2023-01-18 LAB
ALBUMIN SERPL ELPH-MCNC: 3.3 G/DL — SIGNIFICANT CHANGE UP (ref 3.3–5)
ALP SERPL-CCNC: 88 U/L — SIGNIFICANT CHANGE UP (ref 40–120)
ALT FLD-CCNC: 62 U/L — HIGH (ref 10–45)
ANION GAP SERPL CALC-SCNC: 10 MMOL/L — SIGNIFICANT CHANGE UP (ref 5–17)
AST SERPL-CCNC: 36 U/L — SIGNIFICANT CHANGE UP (ref 10–40)
BASOPHILS # BLD AUTO: 0.04 K/UL — SIGNIFICANT CHANGE UP (ref 0–0.2)
BASOPHILS NFR BLD AUTO: 0.6 % — SIGNIFICANT CHANGE UP (ref 0–2)
BILIRUB SERPL-MCNC: 0.6 MG/DL — SIGNIFICANT CHANGE UP (ref 0.2–1.2)
BUN SERPL-MCNC: 21 MG/DL — SIGNIFICANT CHANGE UP (ref 7–23)
CALCIUM SERPL-MCNC: 9.3 MG/DL — SIGNIFICANT CHANGE UP (ref 8.4–10.5)
CHLORIDE SERPL-SCNC: 104 MMOL/L — SIGNIFICANT CHANGE UP (ref 96–108)
CO2 SERPL-SCNC: 24 MMOL/L — SIGNIFICANT CHANGE UP (ref 22–31)
CREAT SERPL-MCNC: 0.98 MG/DL — SIGNIFICANT CHANGE UP (ref 0.5–1.3)
EGFR: 59 ML/MIN/1.73M2 — LOW
EOSINOPHIL # BLD AUTO: 0.15 K/UL — SIGNIFICANT CHANGE UP (ref 0–0.5)
EOSINOPHIL NFR BLD AUTO: 2.2 % — SIGNIFICANT CHANGE UP (ref 0–6)
GLUCOSE SERPL-MCNC: 113 MG/DL — HIGH (ref 70–99)
HCT VFR BLD CALC: 42.3 % — SIGNIFICANT CHANGE UP (ref 34.5–45)
HGB BLD-MCNC: 14.2 G/DL — SIGNIFICANT CHANGE UP (ref 11.5–15.5)
IMM GRANULOCYTES NFR BLD AUTO: 0.3 % — SIGNIFICANT CHANGE UP (ref 0–0.9)
LYMPHOCYTES # BLD AUTO: 2.12 K/UL — SIGNIFICANT CHANGE UP (ref 1–3.3)
LYMPHOCYTES # BLD AUTO: 31.5 % — SIGNIFICANT CHANGE UP (ref 13–44)
MCHC RBC-ENTMCNC: 29.7 PG — SIGNIFICANT CHANGE UP (ref 27–34)
MCHC RBC-ENTMCNC: 33.6 GM/DL — SIGNIFICANT CHANGE UP (ref 32–36)
MCV RBC AUTO: 88.5 FL — SIGNIFICANT CHANGE UP (ref 80–100)
MONOCYTES # BLD AUTO: 0.46 K/UL — SIGNIFICANT CHANGE UP (ref 0–0.9)
MONOCYTES NFR BLD AUTO: 6.8 % — SIGNIFICANT CHANGE UP (ref 2–14)
NEUTROPHILS # BLD AUTO: 3.94 K/UL — SIGNIFICANT CHANGE UP (ref 1.8–7.4)
NEUTROPHILS NFR BLD AUTO: 58.6 % — SIGNIFICANT CHANGE UP (ref 43–77)
NRBC # BLD: 0 /100 WBCS — SIGNIFICANT CHANGE UP (ref 0–0)
PLATELET # BLD AUTO: 259 K/UL — SIGNIFICANT CHANGE UP (ref 150–400)
POTASSIUM SERPL-MCNC: 4.3 MMOL/L — SIGNIFICANT CHANGE UP (ref 3.5–5.3)
POTASSIUM SERPL-SCNC: 4.3 MMOL/L — SIGNIFICANT CHANGE UP (ref 3.5–5.3)
PROT SERPL-MCNC: 7.9 G/DL — SIGNIFICANT CHANGE UP (ref 6–8.3)
RBC # BLD: 4.78 M/UL — SIGNIFICANT CHANGE UP (ref 3.8–5.2)
RBC # FLD: 12.1 % — SIGNIFICANT CHANGE UP (ref 10.3–14.5)
SODIUM SERPL-SCNC: 138 MMOL/L — SIGNIFICANT CHANGE UP (ref 135–145)
WBC # BLD: 6.73 K/UL — SIGNIFICANT CHANGE UP (ref 3.8–10.5)
WBC # FLD AUTO: 6.73 K/UL — SIGNIFICANT CHANGE UP (ref 3.8–10.5)

## 2023-01-18 PROCEDURE — 99223 1ST HOSP IP/OBS HIGH 75: CPT

## 2023-01-18 PROCEDURE — 99232 SBSQ HOSP IP/OBS MODERATE 35: CPT

## 2023-01-18 RX ORDER — ONDANSETRON 8 MG/1
4 TABLET, FILM COATED ORAL THREE TIMES A DAY
Refills: 0 | Status: DISCONTINUED | OUTPATIENT
Start: 2023-01-18 | End: 2023-01-31

## 2023-01-18 RX ORDER — ENOXAPARIN SODIUM 100 MG/ML
40 INJECTION SUBCUTANEOUS EVERY 24 HOURS
Refills: 0 | Status: DISCONTINUED | OUTPATIENT
Start: 2023-01-18 | End: 2023-01-31

## 2023-01-18 RX ADMIN — CLOPIDOGREL BISULFATE 75 MILLIGRAM(S): 75 TABLET, FILM COATED ORAL at 11:11

## 2023-01-18 RX ADMIN — ENOXAPARIN SODIUM 40 MILLIGRAM(S): 100 INJECTION SUBCUTANEOUS at 21:08

## 2023-01-18 RX ADMIN — AMLODIPINE BESYLATE 5 MILLIGRAM(S): 2.5 TABLET ORAL at 05:41

## 2023-01-18 RX ADMIN — Medication 81 MILLIGRAM(S): at 11:11

## 2023-01-18 RX ADMIN — SENNA PLUS 2 TABLET(S): 8.6 TABLET ORAL at 21:09

## 2023-01-18 RX ADMIN — ONDANSETRON 4 MILLIGRAM(S): 8 TABLET, FILM COATED ORAL at 18:25

## 2023-01-18 RX ADMIN — ATORVASTATIN CALCIUM 80 MILLIGRAM(S): 80 TABLET, FILM COATED ORAL at 21:09

## 2023-01-18 NOTE — PROGRESS NOTE ADULT - ASSESSMENT
ASSESSMENT/PLAN  This is a 76 YO  woman with PMH  of HTN who presented to [facility]  on 1/11 with expressive aphasia since 1/8. MRI showed Left parietal acute infarct. She was also found to have stable large hematoma on her RLQ abdomen. Patient now with gait Instability, ADL impairments and Functional impairments.    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  - aphasia  - Continue Comprehensive Rehab Program: PT/OT/ST, 3hours daily and 5 days weekly  - PT: Focused on improving strength, endurance, coordination, balance, functional mobility, and transfers  - OT: Focused on improving strength, fine motor skills, coordination, posture and ADLs.    - ST: to diagnose and treat deficits in swallowing, cognition and communication.   - c/w aspirin, plavix and high dose statin  - ILR placed on 1/13/23     #HTN  - Amlodipine 5mg daily    #HLD  - Lipitor 80mg daily     #Hematoma  - stable hematoma on her RLQ abdomen and the Left Breast.   - Secondary to Lovenox injection on 1/15.     #Pain management  - Tylenol PRN    #DVT ppx  - SCD, TEDs  - last dopplers: Negative for DVT on 1/15    #Bowel Regimen  - Senna, miralax     #Bladder management  - BS on admission, and q 8 hours (SC if > 400)  - Monitor UO    #FEN   - Diet: DASH     #Skin:  - Skin on admission: Large hematoma on her RLQ abdomen and the Left Breast.   - Pressure injury/Skin: Turn Q2hrs while in bed, OOB to Chair, PT/OT     #Dysphagia    - SLP: evaluation and treatment    #Sleep:   - Maintain quiet hours and low stim environment.  - Melatonin PRN to maximize participation in therapy during the day.     #Precaution  - Fall, Aspiration    #GOC  CODE STATUS: FULL CODE     Outpatient Follow-up (Specialty/Name of physician):  Reza Kemp (DO)  Neurology; Vascular Neurology  [address], [address]  [place] [address]  Phone: [phone]  Fax: [phone]  Follow Up Time: 1 month    Michael Kaur (DO)  Cardiology; Internal Medicine  [address], [address]  [place] [address]  Phone: [phone]  Fax: [phone]  Follow Up Time: 1 month ASSESSMENT/PLAN  This is a 76 YO  woman with PMH  of HTN who presented to [facility]  on 1/11 with expressive aphasia since 1/8. MRI showed Left parietal acute infarct. She was also found to have stable large hematoma on her RLQ abdomen. Patient now with gait Instability, ADL impairments and Functional impairments.    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  - aphasia  - dysarthia  - dysphagia  - Continue Comprehensive Rehab Program: PT/OT/ST, 3hours daily and 5 days weekly  - PT: Focused on improving strength, endurance, coordination, balance, functional mobility, and transfers  - OT: Focused on improving strength, fine motor skills, coordination, posture and ADLs.    - ST: to diagnose and treat deficits in swallowing, cognition and communication.   - c/w aspirin, plavix and high dose statin  - ILR placed on 1/13/23     #HTN  - Amlodipine 5mg daily    #HLD  - Lipitor 80mg daily     #Hematoma  - stable hematoma on her RLQ abdomen and the Left Breast.   - Secondary to Lovenox injection on 1/15.     #Pain management  - Tylenol PRN    #DVT ppx  - SCD, TEDs  - last dopplers: Negative for DVT on 1/15    #Bowel Regimen  - Senna, miralax     #Bladder management  - BS on admission, and q 8 hours (SC if > 400)  - Monitor UO    #FEN   - Diet: DASH     #Skin:  - Skin on admission: Large hematoma on her RLQ abdomen and the Left Breast.   - Pressure injury/Skin: Turn Q2hrs while in bed, OOB to Chair, PT/OT     #Dysphagia    - SLP: evaluation and treatment    #Sleep:   - Maintain quiet hours and low stim environment.  - Melatonin PRN to maximize participation in therapy during the day.     #Precaution  - Fall, Aspiration    #GOC  CODE STATUS: FULL CODE     Outpatient Follow-up (Specialty/Name of physician):  Reza Kemp (DO)  Neurology; Vascular Neurology  [address] [facility] [address], [address]  [place] [address]  Phone: [phone]  Fax: [phone]  Follow Up Time: 1 month    Michael Kaur (DO)  Cardiology; Internal Medicine  [address], [address]  [place] [address]  Phone: [phone]  Fax: [phone]  Follow Up Time: 1 month

## 2023-01-18 NOTE — PROGRESS NOTE ADULT - SUBJECTIVE AND OBJECTIVE BOX
HPI  This is a 77-year-old right-handed female patient with PMH  of HTN who presented to [facility]  on 1/11 with expressive aphasia since 1/8. Daughter found the patient in her bedroom. She was trying to tell her daughter that she had trouble speaking. She knew what she wanted to say, but couldn't find the words to express herself. The daughter reports she had a similar episode in the past and was found to have a UTI. She was treated with antibiotics and symptoms resolved. She took her to urgent care since she thought this was a UTI and they prescribed her antibiotics. Patient did not improve with antibiotics, which prompted them to come to the ED. Patient is noncompliant with blood pressure medication.  At baseline, patient occasionally requires assistance to ambulate, but refuses to use walker. She requires some assistance with ADLs. NIHSS 4, pre-mRS 2. MRI showed Left parietal acute infarct.    Patient started on ASA 81mg indefinitely and Plavix for 3 months per ALIN. Patient with a stable large hematoma on her RLQ abdomen for which chemical DVT prophylaxis was not started. Duplex of the lower extremities neg.  and pt started on atorvastatin 80mg daily (titrate to LDL <70). Cardiology consulted, -160s and started on amlodipine 5mg daily. ILR placed on 1/13/23 to monitor for arrhythmias.    Patient was evaluated by PM&R and therapy for functional deficits, gait/ADL impairments and acute rehabilitation was recommended. Patient was medically optimized for discharge to [facility] IRU on 1/17/2022.    SUBJECTIVE  Patient was seen and evaluated at bedside today after undergoing initial OT evaluation.  Discussed with hospitalist chemical DVT/VTE prophylaxis and will start enoxaparin.  Will monitor BP control.    ___________________________________________________________________________________________    Vital Signs Last 24 Hrs  T(C): 36.8 (18 Jan 2023 08:34), Max: 37.2 (17 Jan 2023 15:54)  T(F): 98.2 (18 Jan 2023 08:34), Max: 98.9 (17 Jan 2023 15:54)  HR: 66 (18 Jan 2023 08:34) (66 - 88)  BP: 153/75 (18 Jan 2023 08:34) (116/61 - 187/93)  RR: 15 (18 Jan 2023 08:34) (14 - 15)  SpO2: 95% (18 Jan 2023 08:34) (95% - 98%)    ___________________________________________________________________________________________    LAB                        14.2   6.73  )-----------( 259      ( 18 Jan 2023 05:50 )             42.3     01-18    138  |  104  |  21  ----------------------------<  113<H>  4.3   |  24  |  0.98    Ca    9.3      18 Jan 2023 05:50    TPro  7.9  /  Alb  3.3  /  TBili  0.6  /  DBili  x   /  AST  36  /  ALT  62<H>  /  AlkPhos  88  01-18    LIVER FUNCTIONS - ( 18 Jan 2023 05:50 )  Alb: 3.3 g/dL / Pro: 7.9 g/dL / ALK PHOS: 88 U/L / ALT: 62 U/L / AST: 36 U/L / GGT: x           ___________________________________________________________________________________________    MEDICATIONS  (STANDING):  amLODIPine   Tablet 5 milliGRAM(s) Oral daily  aspirin enteric coated 81 milliGRAM(s) Oral daily  atorvastatin 80 milliGRAM(s) Oral at bedtime  clopidogrel Tablet 75 milliGRAM(s) Oral daily  enoxaparin Injectable 40 milliGRAM(s) SubCutaneous every 24 hours  polyethylene glycol 3350 17 Gram(s) Oral daily  senna 2 Tablet(s) Oral at bedtime    MEDICATIONS  (PRN):  bisacodyl 5 milliGRAM(s) Oral every 12 hours PRN Constipation    ___________________________________________________________________________________________    Review of Systems:   Constitutional: No fever, No Chills, No fatigue  HEENT: No eye pain, No visual disturbances, No difficulty hearing  Pulm: No cough,  No shortness of breath  Cardio: No chest pain, No palpitations  GI:  No abdominal pain, No nausea, No vomiting, No diarrhea, No constipation  : No dysuria, No frequency, No hematuria  Neuro: No headaches, No memory loss, No loss of strength, No numbness, No tremors  Skin: No itching, No rashes, No lesions   Endo: No temperature intolerance  MSK: No joint pain, No joint swelling, No muscle pain, No Neck or back pain  Psych:  No depression, No anxiety    ___________________________________________________________________________________________      Physical Exam:   Constitutional - NAD, Comfortable  HEENT - NCAT, EOMI  Chest - good chest expansion, good respiratory effort, CTAB + loop recorder site c/d/i.   Cardio - warm and well perfused.  Abdomen -  Soft, NTND  Extremities - No peripheral edema, No calf tenderness   Neurologic Exam:                    Cognitive -             Orientation: Awake, Alert, AAO to self, place, date, year, situation            Attention:  Able to repeat. Able to recall 1/3 objects after 5 mins. Unable to do serial 7's.             Memory: Recent  memory intact            Thought: process, content appropriate     Speech - Expressive aphasia, Comprehensible, Mild dysarthria, able to repeat.      Cranial Nerves - No facial asymmetry, Tongue midline, Shoulder shrug intact     Motor -                      LEFT    UE - ShAB 5/5, EF 5/5, EE 5/5, WE 5/5,  WNL                    RIGHT UE - ShAB 5/5, EF 5/5, EE 5/5, WE 5/5,  WNL                    LEFT    LE - HF 4/5, KE 5/5, DF 5/5, PF 5/5                    RIGHT LE - HF 4/5, KE 5/5, DF 5/5, PF 5/5        Sensory - Intact to LT bilateral     Coordination - FTN  intact     OculoVestibular -  No nystagmus  Psychiatric - Mood stable, Affect WNL  Skin on admission: Large hematoma noted on the RLQ and as well on the left breast medial to the areola on the breas

## 2023-01-18 NOTE — DIETITIAN INITIAL EVALUATION ADULT - NS FNS DIET ORDER
on Regular Diet (IDDSI Level 7) w/ Thin Liquids (IDDSI Level 0)   Recommend Change Diet to DASH-TLC Diet   Nutrition Education Provided on DASH-TLC Diet

## 2023-01-18 NOTE — DIETITIAN INITIAL EVALUATION ADULT - PERTINENT LABORATORY DATA
01-18    138  |  104  |  21  ----------------------------<  113<H>  4.3   |  24  |  0.98    Ca    9.3      18 Jan 2023 05:50    TPro  7.9  /  Alb  3.3  /  TBili  0.6  /  DBili  x   /  AST  36  /  ALT  62<H>  /  AlkPhos  88  01-18  A1C with Estimated Average Glucose Result: 5.6 % (01-12-23 @ 05:39)

## 2023-01-18 NOTE — PROVIDER CONTACT NOTE (OTHER) - ACTION/TREATMENT ORDERED:
MD notified, zofran ordered and given to patient. RN rechecked blood pressure, now 154/76 and pt states she feels better. Will continue to monitor.

## 2023-01-18 NOTE — CONSULT NOTE ADULT - SUBJECTIVE AND OBJECTIVE BOX
Patient is a 77y old  Female who presents with a chief complaint of CVA (18 Jan 2023 12:43)    HPI:  This is a 77-year-old right-handed female patient with PMH  of HTN who presented to [place]  on 1/11 with expressive aphasia since 1/8. Daughter found the patient in her bedroom. She was trying to tell her daughter that she had trouble speaking. She knew what she wanted to say, but couldn't find the words to express herself. The daughter reports she had a similar episode in the past and was found to have a UTI. She was treated with antibiotics and symptoms resolved. She took her to urgent care since she thought this was a UTI and they prescribed her antibiotics. Patient did not improve with antibiotics, which prompted them to come to the ED. Patient is noncompliant with blood pressure medication.  At baseline, patient occasionally requires assistance to ambulate, but refuses to use walker. She requires some assistance with ADLs. NIHSS 4, pre-mRS 2. MRI showed Left parietal acute infarct.    Patient started on ASA 81mg indefinitely and Plavix for 3 months per ALIN. Patient with a stable large hematoma on her RLQ abdomen for which chemical DVT prophylaxis was not started. Duplex of the lower extremities neg.  and pt started on atorvastatin 80mg daily (titrate to LDL <70). Cardiology consulted, -160s and started on amlodipine 5mg daily. ILR placed on 1/13/23 to monitor for arrhythmias.    Patient was evaluated by PM&R and therapy for functional deficits, gait/ADL impairments and acute rehabilitation was recommended. Patient was medically optimized for discharge to [facility] IRU on 1/17/2022.   (17 Jan 2023 13:20)      Subjective History:    PAST MEDICAL & SURGICAL HISTORY:  HTN (hypertension)        SOCIAL HISTORY:  FAMILY HISTORY:    ALLERGIES:  No Known Allergies    MEDICATIONS  (STANDING):  amLODIPine   Tablet 5 milliGRAM(s) Oral daily  aspirin enteric coated 81 milliGRAM(s) Oral daily  atorvastatin 80 milliGRAM(s) Oral at bedtime  clopidogrel Tablet 75 milliGRAM(s) Oral daily  enoxaparin Injectable 40 milliGRAM(s) SubCutaneous every 24 hours  polyethylene glycol 3350 17 Gram(s) Oral daily  senna 2 Tablet(s) Oral at bedtime    MEDICATIONS  (PRN):  bisacodyl 5 milliGRAM(s) Oral every 12 hours PRN Constipation    Review of Systems: Refer to HPI for pertinent positives and negatives. All other ROS reviewed and negative except as otherwise stated above.    Vital Signs Last 24 Hrs  T(F): 98.2 (18 Jan 2023 08:34), Max: 98.9 (17 Jan 2023 15:54)  HR: 66 (18 Jan 2023 08:34) (66 - 88)  BP: 153/75 (18 Jan 2023 08:34) (116/61 - 187/93)  RR: 15 (18 Jan 2023 08:34) (14 - 15)  SpO2: 95% (18 Jan 2023 08:34) (95% - 98%)  I&O's Summary    PHYSICAL EXAM:  GENERAL: NAD, well-groomed  HEAD:  Atraumatic, Normocephalic  EYES: EOMI, PERRL, conjunctiva and sclera clear  ENMT: Moist mucous membranes, Good dentition  NECK: Supple, No JVD  CHEST/LUNG: Clear to auscultation bilaterally, non-labored breathing, good air entry  HEART: RRR; S1/S2, No murmur  ABDOMEN: Soft, Nontender, Nondistended; Bowel sounds present  VASCULAR: Normal pulses, Normal capillary refill  EXTREMITIES: No cyanosis, No edema  LYMPH: No lymphadenopathy noted  SKIN: Warm, Intact  PSYCH: Normal mood and affect  NERVOUS SYSTEM:  A/O x3, Good concentration; CN 2-12 intact, No focal deficits, moves all extremities    LABS:                        14.2   6.73  )-----------( 259      ( 18 Jan 2023 05:50 )             42.3     01-18    138  |  104  |  21  ----------------------------<  113  4.3   |  24  |  0.98    Ca    9.3      18 Jan 2023 05:50    TPro  7.9  /  Alb  3.3  /  TBili  0.6  /  DBili  x   /  AST  36  /  ALT  62  /  AlkPhos  88  01-18      PT/INR - ( 15 Kiran 2023 14:15 )   PT: 12.8 sec;   INR: 1.10 ratio                                         Culture - Urine (collected 11 Jan 2023 21:14)  Source: Clean Catch Clean Catch (Midstream)  Final Report (14 Jan 2023 16:11):    50,000 - 99,000 CFU/mL Pseudomonas aeruginosa  Organism: Pseudomonas aeruginosa (14 Jan 2023 16:11)  Organism: Pseudomonas aeruginosa (14 Jan 2023 16:11)      -  Amikacin: S <=16      -  Aztreonam: S <=4      -  Cefepime: S <=2      -  Ceftazidime: S 4      -  Ciprofloxacin: S <=0.25      -  Gentamicin: S <=2      -  Imipenem: S 2      -  Levofloxacin: S <=0.5      -  Meropenem: S <=1      -  Piperacillin/Tazobactam: S <=8      -  Tobramycin: S <=2      Method Type: KIRSTEN      COVID-19 PCR: NotDetec (01-17-23 @ 22:30)  COVID-19 PCR: NotDetec (01-16-23 @ 09:35)    RADIOLOGY & ADDITIONAL TESTS:    Care Discussed with Consultants/Other Providers: Patient is a 77y old  Female who presents with a chief complaint of CVA (18 Jan 2023 12:43)    HPI:  This is a 77-year-old right-handed female patient with PMH  of HTN who presented to [place]  on 1/11 with expressive aphasia since 1/8. Daughter found the patient in her bedroom. She was trying to tell her daughter that she had trouble speaking. She knew what she wanted to say, but couldn't find the words to express herself. The daughter reports she had a similar episode in the past and was found to have a UTI. She was treated with antibiotics and symptoms resolved. She took her to urgent care since she thought this was a UTI and they prescribed her antibiotics. Patient did not improve with antibiotics, which prompted them to come to the ED. Patient is noncompliant with blood pressure medication.  At baseline, patient occasionally requires assistance to ambulate, but refuses to use walker. She requires some assistance with ADLs. NIHSS 4, pre-mRS 2. MRI showed Left parietal acute infarct.    Patient started on ASA 81mg indefinitely and Plavix for 3 months per ALIN. Patient with a stable large hematoma on her RLQ abdomen for which chemical DVT prophylaxis was not started. Duplex of the lower extremities neg.  and pt started on atorvastatin 80mg daily (titrate to LDL <70). Cardiology consulted, -160s and started on amlodipine 5mg daily. ILR placed on 1/13/23 to monitor for arrhythmias.    Patient was evaluated by PM&R and therapy for functional deficits, gait/ADL impairments and acute rehabilitation was recommended. Patient was medically optimized for discharge to [facility] IRU on 1/17/2022.   (17 Jan 2023 13:20)      Subjective History:  Patient seen and examined at bedside. Patient at this time laying in bed comfortably and without any acute complaints. She denies headache, changes in vision or chest pain/sob. Feels well. States that she was at home when she realized difficulty speaking and found to have stroke.     PAST MEDICAL & SURGICAL HISTORY:  HTN (hypertension)    SOCIAL HISTORY:  Lives at home with daughter and grandchildren  Independent with ADLs and ambulation   Denies history of smoking, EtOH or illicit drug use     FAMILY HISTORY:  no FM history    ALLERGIES:  No Known Allergies    MEDICATIONS  (STANDING):  amLODIPine   Tablet 5 milliGRAM(s) Oral daily  aspirin enteric coated 81 milliGRAM(s) Oral daily  atorvastatin 80 milliGRAM(s) Oral at bedtime  clopidogrel Tablet 75 milliGRAM(s) Oral daily  enoxaparin Injectable 40 milliGRAM(s) SubCutaneous every 24 hours  polyethylene glycol 3350 17 Gram(s) Oral daily  senna 2 Tablet(s) Oral at bedtime    MEDICATIONS  (PRN):  bisacodyl 5 milliGRAM(s) Oral every 12 hours PRN Constipation    Review of Systems:  CONSTITUTIONAL: denies fever, chills, fatigue, weakness  HEENT: denies blurred vision, sore throat  CARDIOVASCULAR: denies chest pain, chest pressure, palpitations  RESPIRATORY: denies shortness of breath, sputum production  GASTROINTESTINAL: denies nausea, vomiting, diarrhea, abdominal pain  GENITOURINARY: denies dysuria, discharge  NEUROLOGICAL: denies numbness, headache, focal weakness  MUSCULOSKELETAL: denies new joint pain, muscle aches  HEMATOLOGIC: denies gross bleeding, bruising  PSYCHIATRIC: denies recent changes in anxiety, depression    Vital Signs Last 24 Hrs  T(F): 98.2 (18 Jan 2023 08:34), Max: 98.9 (17 Jan 2023 15:54)  HR: 66 (18 Jan 2023 08:34) (66 - 88)  BP: 153/75 (18 Jan 2023 08:34) (116/61 - 187/93)  RR: 15 (18 Jan 2023 08:34) (14 - 15)  SpO2: 95% (18 Jan 2023 08:34) (95% - 98%)  I&O's Summary    PHYSICAL EXAM:  GENERAL: NAD, laying in bed  HEAD:  Atraumatic, Normocephalic  EYES: PERRL, conjunctiva and sclera clear  ENMT: Moist mucous membranes, Good dentition  CHEST/LUNG: Clear to auscultation bilaterally, non-labored breathing, good air entry  HEART: RRR; S1/S2, No murmur  ABDOMEN: Soft, Nontender, Nondistended; Bowel sounds present, large hematoma noted on RLQ   EXTREMITIES: No cyanosis, No edema  SKIN: Warm, perfused   PSYCH: Normal mood and affect    LABS:                        14.2   6.73  )-----------( 259      ( 18 Jan 2023 05:50 )             42.3     01-18    138  |  104  |  21  ----------------------------<  113  4.3   |  24  |  0.98    Ca    9.3      18 Jan 2023 05:50    TPro  7.9  /  Alb  3.3  /  TBili  0.6  /  DBili  x   /  AST  36  /  ALT  62  /  AlkPhos  88  01-18      PT/INR - ( 15 Kiran 2023 14:15 )   PT: 12.8 sec;   INR: 1.10 ratio                                         Culture - Urine (collected 11 Jan 2023 21:14)  Source: Clean Catch Clean Catch (Midstream)  Final Report (14 Jan 2023 16:11):    50,000 - 99,000 CFU/mL Pseudomonas aeruginosa  Organism: Pseudomonas aeruginosa (14 Jan 2023 16:11)  Organism: Pseudomonas aeruginosa (14 Jan 2023 16:11)      -  Amikacin: S <=16      -  Aztreonam: S <=4      -  Cefepime: S <=2      -  Ceftazidime: S 4      -  Ciprofloxacin: S <=0.25      -  Gentamicin: S <=2      -  Imipenem: S 2      -  Levofloxacin: S <=0.5      -  Meropenem: S <=1      -  Piperacillin/Tazobactam: S <=8      -  Tobramycin: S <=2      Method Type: KIRSTEN      COVID-19 PCR: Jose Angelc (01-17-23 @ 22:30)  COVID-19 PCR: NotDetec (01-16-23 @ 09:35)    RADIOLOGY & ADDITIONAL TESTS:    Care Discussed with Consultants/Other Providers:

## 2023-01-18 NOTE — CHART NOTE - NSCHARTNOTEFT_GEN_A_CORE
REHABILITATION DIAGNOSIS/IMPAIRMENT GROUP CODE:  CVA      COMORBIDITIES/COMPLICATING CONDITIONS IMPACTING REHABILITATION:  HEALTH ISSUES - PROBLEM Dx:  - acute Left parietal infarct with L distal M2 occlusion. 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  - aphasia  - dysarthia  - dysphagia      PAST MEDICAL & SURGICAL HISTORY:  HTN (hypertension)          Based upon consideration of the patient's impairments, functional status, complicating conditions and any other contributing factors and after information garnered from the assessments of all therapy disciplines involved in treating the patient and other pertinent clinicians:    INTERDISCIPLINARY REHABILITATION INTERVENTIONS:    [ X  ] Transfer Training  [X   ] Bed Mobility  [ X  ] Therapeutic Exercise  [ X  ] Balance/Coordination Exercises  [ X  ] Locomotion retraining  [ X  ] Stairs  [ X  ] Functional Transfer Training  [ X  ] Bowel/Bladder program  [  X ] Pain Management  [ X  ] Skin/Wound Care  [X   ] Visual/Perceptual Training  [X   ] Therapeutic Recreation Activities  [  X ] Neuromuscular Re-education  [  X ] Activities of Daily Living  [ X  ] Speech Exercise  [   ] Swallowing Exercises  [   ] Vital Stim  [   ] Dietary Supplements  [ X  ] Calorie Count  [X   ] Cognitive Exercises  [  X ] Congnitive/Linguistic Treatment  [   ] Behavior Program  [  X ] Neuropsych Therapy  [ X  ] Patient/Family Counseling  [ X  ] Family Training  [ X  ] Community Re-entry  [   ] Orthotic Evaluation  [   ] Prosthetic Eval/Training    MEDICAL PROGNOSIS:  good     REHAB POTENTIAL:  good     EXPECTED DAILY THERAPY:         PT: 1hr/day       OT: 1hr/day       ST: 1hr/day       P&O: 0    EXPECTED INTENSITY OF PROGRAM:  3 hrs/day    EXPECTED FREQUENCY OF PROGRAM:  5 days/week    ESTIMATED LOS:  10-14 days    ESTIMATED DISPOSITION:  home    INTERDISCIPLINARY FUNCTIONAL OUTCOMES?GOALS:         Gait/Mobility: mod-independent       Transfers: mod-independent       ADLs: mod-independent       Functional Transfers: mod-independent       Medication Management: mod-independent       Communication: independent       Cognitive:               Bladder: continent       Bowel: continent

## 2023-01-18 NOTE — DIETITIAN INITIAL EVALUATION ADULT - PERTINENT MEDS FT
MEDICATIONS  (STANDING):  amLODIPine   Tablet 5 milliGRAM(s) Oral daily  aspirin enteric coated 81 milliGRAM(s) Oral daily  atorvastatin 80 milliGRAM(s) Oral at bedtime  clopidogrel Tablet 75 milliGRAM(s) Oral daily  polyethylene glycol 3350 17 Gram(s) Oral daily  senna 2 Tablet(s) Oral at bedtime    MEDICATIONS  (PRN):  bisacodyl 5 milliGRAM(s) Oral every 12 hours PRN Constipation

## 2023-01-18 NOTE — DIETITIAN INITIAL EVALUATION ADULT - ADD RECOMMEND
1) Monitor Weights, Intake, Tolerance, Skin & Labwork  2) Recommend Change Diet to DASH-TLC Diet   3) Nutrition Education Provided on DASH-TLC Diet   4) Continue Nutrition Plan of Care

## 2023-01-18 NOTE — DIETITIAN INITIAL EVALUATION ADULT - ENTER TO (CAL/KG)
[FreeTextEntry1] : Alopecia areata. [de-identified] : Still with some progression.  Minimal or no itching after the last SADBE tx.\par  30

## 2023-01-18 NOTE — DIETITIAN INITIAL EVALUATION ADULT - OTHER INFO
Initial Nutrition Assessment   77yr Old Female   Denies Food Allergy/Intolerance  Tolerates Diet Well - No Chewing/Swallowing Complications (Per Patient)  Consumed % of 1st Meal (as Per Documentation)  Surgical Incision on  Left Chest & No Pressure Ulcers (as Per Nursing Flow Sheets)  No Edema Noted (as Per Nursing Flow Sheets)  No Recent Diarrhea/Constipation & Some Recent Nausea/Vomiting (as Per Patient)

## 2023-01-18 NOTE — PROVIDER CONTACT NOTE (OTHER) - ASSESSMENT
Pt is A&Ox3, expressive aphasia. Pt vomited after dinner and complains of nausea.  /84, Temp 98.5F, HR 85, RR 15, SpO2 95%

## 2023-01-19 LAB
ALBUMIN SERPL ELPH-MCNC: 3.3 G/DL — SIGNIFICANT CHANGE UP (ref 3.3–5)
ALP SERPL-CCNC: 86 U/L — SIGNIFICANT CHANGE UP (ref 40–120)
ALT FLD-CCNC: 53 U/L — HIGH (ref 10–45)
ANION GAP SERPL CALC-SCNC: 8 MMOL/L — SIGNIFICANT CHANGE UP (ref 5–17)
AST SERPL-CCNC: 33 U/L — SIGNIFICANT CHANGE UP (ref 10–40)
BILIRUB SERPL-MCNC: 0.5 MG/DL — SIGNIFICANT CHANGE UP (ref 0.2–1.2)
BUN SERPL-MCNC: 19 MG/DL — SIGNIFICANT CHANGE UP (ref 7–23)
CALCIUM SERPL-MCNC: 9 MG/DL — SIGNIFICANT CHANGE UP (ref 8.4–10.5)
CHLORIDE SERPL-SCNC: 101 MMOL/L — SIGNIFICANT CHANGE UP (ref 96–108)
CO2 SERPL-SCNC: 28 MMOL/L — SIGNIFICANT CHANGE UP (ref 22–31)
CREAT SERPL-MCNC: 1.19 MG/DL — SIGNIFICANT CHANGE UP (ref 0.5–1.3)
EGFR: 47 ML/MIN/1.73M2 — LOW
GLUCOSE SERPL-MCNC: 111 MG/DL — HIGH (ref 70–99)
HCT VFR BLD CALC: 41.5 % — SIGNIFICANT CHANGE UP (ref 34.5–45)
HGB BLD-MCNC: 13.6 G/DL — SIGNIFICANT CHANGE UP (ref 11.5–15.5)
MCHC RBC-ENTMCNC: 29.2 PG — SIGNIFICANT CHANGE UP (ref 27–34)
MCHC RBC-ENTMCNC: 32.8 GM/DL — SIGNIFICANT CHANGE UP (ref 32–36)
MCV RBC AUTO: 89.1 FL — SIGNIFICANT CHANGE UP (ref 80–100)
NRBC # BLD: 0 /100 WBCS — SIGNIFICANT CHANGE UP (ref 0–0)
PLATELET # BLD AUTO: 268 K/UL — SIGNIFICANT CHANGE UP (ref 150–400)
POTASSIUM SERPL-MCNC: 4.2 MMOL/L — SIGNIFICANT CHANGE UP (ref 3.5–5.3)
POTASSIUM SERPL-SCNC: 4.2 MMOL/L — SIGNIFICANT CHANGE UP (ref 3.5–5.3)
PROT SERPL-MCNC: 7.5 G/DL — SIGNIFICANT CHANGE UP (ref 6–8.3)
RBC # BLD: 4.66 M/UL — SIGNIFICANT CHANGE UP (ref 3.8–5.2)
RBC # FLD: 11.9 % — SIGNIFICANT CHANGE UP (ref 10.3–14.5)
SODIUM SERPL-SCNC: 137 MMOL/L — SIGNIFICANT CHANGE UP (ref 135–145)
WBC # BLD: 6 K/UL — SIGNIFICANT CHANGE UP (ref 3.8–10.5)
WBC # FLD AUTO: 6 K/UL — SIGNIFICANT CHANGE UP (ref 3.8–10.5)

## 2023-01-19 PROCEDURE — 99232 SBSQ HOSP IP/OBS MODERATE 35: CPT

## 2023-01-19 RX ORDER — AMLODIPINE BESYLATE 2.5 MG/1
10 TABLET ORAL DAILY
Refills: 0 | Status: DISCONTINUED | OUTPATIENT
Start: 2023-01-20 | End: 2023-01-31

## 2023-01-19 RX ORDER — AMLODIPINE BESYLATE 2.5 MG/1
5 TABLET ORAL ONCE
Refills: 0 | Status: COMPLETED | OUTPATIENT
Start: 2023-01-19 | End: 2023-01-19

## 2023-01-19 RX ADMIN — AMLODIPINE BESYLATE 5 MILLIGRAM(S): 2.5 TABLET ORAL at 12:29

## 2023-01-19 RX ADMIN — Medication 81 MILLIGRAM(S): at 11:16

## 2023-01-19 RX ADMIN — CLOPIDOGREL BISULFATE 75 MILLIGRAM(S): 75 TABLET, FILM COATED ORAL at 11:17

## 2023-01-19 RX ADMIN — AMLODIPINE BESYLATE 5 MILLIGRAM(S): 2.5 TABLET ORAL at 05:39

## 2023-01-19 RX ADMIN — ENOXAPARIN SODIUM 40 MILLIGRAM(S): 100 INJECTION SUBCUTANEOUS at 21:37

## 2023-01-19 RX ADMIN — ATORVASTATIN CALCIUM 80 MILLIGRAM(S): 80 TABLET, FILM COATED ORAL at 21:37

## 2023-01-19 RX ADMIN — SENNA PLUS 2 TABLET(S): 8.6 TABLET ORAL at 21:37

## 2023-01-19 NOTE — PROGRESS NOTE ADULT - ASSESSMENT
77 year old PMH HTN presented to [facility] on 1/11 after having expressive aphasia since 1/8,  found to have acute Left parietal infarct with L distal M2 occlusion. 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

## 2023-01-19 NOTE — PROGRESS NOTE ADULT - SUBJECTIVE AND OBJECTIVE BOX
HPI  This is a 77-year-old right-handed female patient with PMH  of HTN who presented to [place]  on 1/11 with expressive aphasia since 1/8. Daughter found the patient in her bedroom. She was trying to tell her daughter that she had trouble speaking. She knew what she wanted to say, but couldn't find the words to express herself. The daughter reports she had a similar episode in the past and was found to have a UTI. She was treated with antibiotics and symptoms resolved. She took her to urgent care since she thought this was a UTI and they prescribed her antibiotics. Patient did not improve with antibiotics, which prompted them to come to the ED. Patient is noncompliant with blood pressure medication.  At baseline, patient occasionally requires assistance to ambulate, but refuses to use walker. She requires some assistance with ADLs. NIHSS 4, pre-mRS 2. MRI showed Left parietal acute infarct.    Patient started on ASA 81mg indefinitely and Plavix for 3 months per ALIN. Patient with a stable large hematoma on her RLQ abdomen for which chemical DVT prophylaxis was not started. Duplex of the lower extremities neg.  and pt started on atorvastatin 80mg daily (titrate to LDL <70). Cardiology consulted, -160s and started on amlodipine 5mg daily. ILR placed on 1/13/23 to monitor for arrhythmias.    Patient was evaluated by PM&R and therapy for functional deficits, gait/ADL impairments and acute rehabilitation was recommended. Patient was medically optimized for discharge to [facility] IRU on 1/17/2022.    SUBJECTIVE  Patient seen and examined at bedside.  No events overnight.   She was noted to have high BP. Will speak to the hospitalist regarding increasing the Norvasc.  Patient denies any headaches, no new deficits. Denies any dizziness while in therapy.    Denies any pain at this time.   Denies any-other complaints at this time.   Participating in therapy     ___________________________________________________________________________________________    T(C): 36.6 (01-19-23 @ 08:25)  T(F): 97.8 (01-19-23 @ 08:25), Max: 98.5 (01-18-23 @ 18:00)  HR: 72 (01-19-23 @ 08:25) (72 - 85)  BP: 163/81 (01-19-23 @ 08:25) (154/76 - 185/63)  RR:  (14 - 16)  SpO2:  (94% - 98%)  Wt(kg): --    ___________________________________________________________________________________________    LAB                                   13.6   6.00  )-----------( 268      ( 19 Jan 2023 06:30 )             41.5     01-19    137  |  101  |  19  ----------------------------<  111<H>  4.2   |  28  |  1.19    Ca    9.0      19 Jan 2023 06:30    TPro  7.5  /  Alb  3.3  /  TBili  0.5  /  DBili  x   /  AST  33  /  ALT  53<H>  /  AlkPhos  86  01-19      ___________________________________________________________________________________________    MEDICATIONS  (STANDING):  amLODIPine   Tablet 5 milliGRAM(s) Oral daily  aspirin enteric coated 81 milliGRAM(s) Oral daily  atorvastatin 80 milliGRAM(s) Oral at bedtime  clopidogrel Tablet 75 milliGRAM(s) Oral daily  enoxaparin Injectable 40 milliGRAM(s) SubCutaneous every 24 hours  polyethylene glycol 3350 17 Gram(s) Oral daily  senna 2 Tablet(s) Oral at bedtime    MEDICATIONS  (PRN):  bisacodyl 5 milliGRAM(s) Oral every 12 hours PRN Constipation  ondansetron   Disintegrating Tablet 4 milliGRAM(s) Oral three times a day PRN Nausea and/or Vomiting      ___________________________________________________________________________________________    Review of Systems:   Constitutional: No fever, No Chills, No fatigue  HEENT: No eye pain, No visual disturbances, No difficulty hearing  Pulm: No cough,  No shortness of breath  Cardio: No chest pain, No palpitations  GI:  No abdominal pain, No nausea, No vomiting, No diarrhea, No constipation  : No dysuria, No frequency, No hematuria  Neuro: No headaches, No memory loss, No loss of strength, No numbness, No tremors  Skin: No itching, No rashes, No lesions   Endo: No temperature intolerance  MSK: No joint pain, No joint swelling, No muscle pain, No Neck or back pain  Psych:  No depression, No anxiety    ___________________________________________________________________________________________      Physical Exam:   Constitutional - NAD, Comfortable  HEENT - NCAT, EOMI  Chest - good chest expansion, good respiratory effort, CTAB + loop recorder site c/d/i.   Cardio - warm and well perfused.  Abdomen -  Soft, NTND  Extremities - No peripheral edema, No calf tenderness   Neurologic Exam:                    Cognitive -             Orientation: Awake, Alert, AAO to self, place, date, year, situation            Attention:  Able to repeat. Able to recall 1/3 objects after 5 mins. Unable to do serial 7's.             Memory: Recent  memory intact            Thought: process, content appropriate     Speech - Expressive aphasia, Comprehensible, Mild dysarthria, able to repeat.      Cranial Nerves - No facial asymmetry, Tongue midline, Shoulder shrug intact     Motor -                      LEFT    UE - ShAB 5/5, EF 5/5, EE 5/5, WE 5/5,  WNL                    RIGHT UE - ShAB 5/5, EF 5/5, EE 5/5, WE 5/5,  WNL                    LEFT    LE - HF 4/5, KE 5/5, DF 5/5, PF 5/5                    RIGHT LE - HF 4/5, KE 5/5, DF 5/5, PF 5/5        Sensory - Intact to LT bilateral     Coordination - FTN  intact     OculoVestibular -  No nystagmus  Psychiatric - Mood stable, Affect WNL  Skin on admission: Large hematoma noted on the RLQ and as well on the left breast medial to the areola on the breas HPI  This is a 77-year-old right-handed female patient with PMH  of HTN who presented to [place]  on 1/11 with expressive aphasia since 1/8. Daughter found the patient in her bedroom. She was trying to tell her daughter that she had trouble speaking. She knew what she wanted to say, but couldn't find the words to express herself. The daughter reports she had a similar episode in the past and was found to have a UTI. She was treated with antibiotics and symptoms resolved. She took her to urgent care since she thought this was a UTI and they prescribed her antibiotics. Patient did not improve with antibiotics, which prompted them to come to the ED. Patient is noncompliant with blood pressure medication.  At baseline, patient occasionally requires assistance to ambulate, but refuses to use walker. She requires some assistance with ADLs. NIHSS 4, pre-mRS 2. MRI showed Left parietal acute infarct.    Patient started on ASA 81mg indefinitely and Plavix for 3 months per ALIN. Patient with a stable large hematoma on her RLQ abdomen for which chemical DVT prophylaxis was not started. Duplex of the lower extremities neg.  and pt started on atorvastatin 80mg daily (titrate to LDL <70). Cardiology consulted, -160s and started on amlodipine 5mg daily. ILR placed on 1/13/23 to monitor for arrhythmias.    Patient was evaluated by PM&R and therapy for functional deficits, gait/ADL impairments and acute rehabilitation was recommended. Patient was medically optimized for discharge to [facility] IRU on 1/17/2022.    SUBJECTIVE  Patient seen and examined at bedside.  No events overnight.   She was noted to have high BP. Will speak to the hospitalist regarding increasing the Norvasc.  Patient denies any headaches, no new deficits. Denies any dizziness while in therapy.    Denies any pain at this time.   Denies any-other complaints at this time.   Participating in therapy     ___________________________________________________________________________________________    Vital Signs Last 24 Hrs  T(C): 36.6 (19 Jan 2023 08:25), Max: 36.9 (18 Jan 2023 18:00)  T(F): 97.8 (19 Jan 2023 08:25), Max: 98.5 (18 Jan 2023 18:00)  HR: 72 (19 Jan 2023 08:25) (72 - 85)  BP: 130/76 (19 Jan 2023 12:25) (130/76 - 185/63)  RR: 14 (19 Jan 2023 08:25) (14 - 16)  SpO2: 94% (19 Jan 2023 08:25) (94% - 98%)    ___________________________________________________________________________________________    LAB                                   13.6   6.00  )-----------( 268      ( 19 Jan 2023 06:30 )             41.5     01-19    137  |  101  |  19  ----------------------------<  111<H>  4.2   |  28  |  1.19    Ca    9.0      19 Jan 2023 06:30    TPro  7.5  /  Alb  3.3  /  TBili  0.5  /  DBili  x   /  AST  33  /  ALT  53<H>  /  AlkPhos  86  01-19      ___________________________________________________________________________________________    MEDICATIONS  (STANDING):  aspirin enteric coated 81 milliGRAM(s) Oral daily  atorvastatin 80 milliGRAM(s) Oral at bedtime  clopidogrel Tablet 75 milliGRAM(s) Oral daily  enoxaparin Injectable 40 milliGRAM(s) SubCutaneous every 24 hours  polyethylene glycol 3350 17 Gram(s) Oral daily  senna 2 Tablet(s) Oral at bedtime    MEDICATIONS  (PRN):  bisacodyl 5 milliGRAM(s) Oral every 12 hours PRN Constipation  ondansetron   Disintegrating Tablet 4 milliGRAM(s) Oral three times a day PRN Nausea and/or Vomiting    ___________________________________________________________________________________________    Review of Systems:   Constitutional: No fever, No Chills, No fatigue  HEENT: No eye pain, No visual disturbances, No difficulty hearing  Pulm: No cough,  No shortness of breath  Cardio: No chest pain, No palpitations  GI:  No abdominal pain, No nausea, No vomiting, No diarrhea, No constipation  : No dysuria, No frequency, No hematuria  Neuro: No headaches, No memory loss, No loss of strength, No numbness, No tremors  Skin: No itching, No rashes, No lesions   Endo: No temperature intolerance  MSK: No joint pain, No joint swelling, No muscle pain, No Neck or back pain  Psych:  No depression, No anxiety    ___________________________________________________________________________________________      Physical Exam:   Constitutional - NAD, Comfortable  HEENT - NCAT, EOMI  Chest - good chest expansion, good respiratory effort, CTAB + loop recorder site c/d/i.   Cardio - warm and well perfused.  Abdomen -  Soft, NTND  Extremities - No peripheral edema, No calf tenderness   Neurologic Exam:                    Cognitive -             Orientation: Awake, Alert, AAO to self, place, date, year, situation            Attention:  Able to repeat. Able to recall 1/3 objects after 5 mins. Unable to do serial 7's.             Memory: Recent  memory intact            Thought: process, content appropriate     Speech - Expressive aphasia, Comprehensible, Mild dysarthria, able to repeat.      Cranial Nerves - No facial asymmetry, Tongue midline, Shoulder shrug intact     Motor -                      LEFT    UE - ShAB 5/5, EF 5/5, EE 5/5, WE 5/5,  WNL                    RIGHT UE - ShAB 5/5, EF 5/5, EE 5/5, WE 5/5,  WNL                    LEFT    LE - HF 4/5, KE 5/5, DF 5/5, PF 5/5                    RIGHT LE - HF 4/5, KE 5/5, DF 5/5, PF 5/5        Sensory - Intact to LT bilateral     Coordination - FTN  intact     OculoVestibular -  No nystagmus  Psychiatric - Mood stable, Affect WNL  Skin on admission: Large hematoma noted on the RLQ and as well on the left breast medial to the areola on the breas

## 2023-01-19 NOTE — PROGRESS NOTE ADULT - ASSESSMENT
ASSESSMENT/PLAN  This is a 76 YO  woman with PMH  of HTN who presented to [place]  on 1/11 with expressive aphasia since 1/8. MRI showed Left parietal acute infarct. She was also found to have stable large hematoma on her RLQ abdomen. Patient now with gait Instability, ADL impairments and Functional impairments.    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  - aphasia  - dysarthia  - dysphagia  - Continue Comprehensive Rehab Program: PT/OT/ST, 3hours daily and 5 days weekly  - PT: Focused on improving strength, endurance, coordination, balance, functional mobility, and transfers  - OT: Focused on improving strength, fine motor skills, coordination, posture and ADLs.    - ST: to diagnose and treat deficits in swallowing, cognition and communication.   - c/w aspirin, plavix and high dose statin  - ILR placed on 1/13/23     #HTN  - Amlodipine 5mg daily  - Will speak to the hospitalist regarding increasing the BP as she has been running high.     #HLD  - Lipitor 80mg daily     #Hematoma  - stable hematoma on her RLQ abdomen and the Left Breast.   - Continue to monitor, as the patient was restarted on the Lovenox     #Pain management  - Tylenol PRN    #DVT ppx  - Lovenox   - last dopplers: Negative for DVT on 1/15    #Bowel Regimen  - Senna, miralax     #Bladder management  - BS on admission, and q 8 hours (SC if > 400)  - Monitor UO    #FEN   - Diet: DASH     #Skin:  - Skin on admission: Large hematoma on her RLQ abdomen and the Left Breast.   - Pressure injury/Skin: Turn Q2hrs while in bed, OOB to Chair, PT/OT     #Dysphagia    - SLP: evaluation and treatment    #Sleep:   - Maintain quiet hours and low stim environment.  - Melatonin PRN to maximize participation in therapy during the day.     #Precaution  - Fall, Aspiration    #GOC  CODE STATUS: FULL CODE      ASSESSMENT/PLAN  This is a 76 YO  woman with PMH  of HTN who presented to [place]  on 1/11 with expressive aphasia since 1/8. MRI showed Left parietal acute infarct. She was also found to have stable large hematoma on her RLQ abdomen. Patient now with gait Instability, ADL impairments and Functional impairments.    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  - aphasia  - dysarthia  - dysphagia  - Continue Comprehensive Rehab Program: PT/OT/ST, 3hours daily and 5 days weekly  - PT: Focused on improving strength, endurance, coordination, balance, functional mobility, and transfers  - OT: Focused on improving strength, fine motor skills, coordination, posture and ADLs.    - ST: to diagnose and treat deficits in swallowing, cognition and communication.   - c/w aspirin, plavix and high dose statin  - ILR placed on 1/13/23     #HTN  - Amlodipine increased to 10mg daily.     #HLD  - Lipitor 80mg daily     #Hematoma  - stable hematoma on her RLQ abdomen and the Left Breast.   - Continue to monitor, as the patient was restarted on the Lovenox     #Pain management  - Tylenol PRN    #DVT ppx  - Lovenox   - last dopplers: Negative for DVT on 1/15    #Bowel Regimen  - Senna, miralax     #Bladder management  - BS on admission, and q 8 hours (SC if > 400)  - Monitor UO    #FEN   - Diet: DASH     #Skin:  - Skin on admission: Large hematoma on her RLQ abdomen and the Left Breast.   - Pressure injury/Skin: Turn Q2hrs while in bed, OOB to Chair, PT/OT     #Dysphagia    - SLP: evaluation and treatment    #Sleep:   - Maintain quiet hours and low stim environment.  - Melatonin PRN to maximize participation in therapy during the day.     #Precaution  - Fall, Aspiration    #GOC  CODE STATUS: FULL CODE       IDTR     # Case discussed in IDT rounds 1/19  - She lives in apartment with 2 steps and has support from 2 daughter.   - She is setup for grooming and eating. CG for UBD and LBD. Min assist for toileting. Goal   - She is walking 70 feet with RW with min assist. she is able to do 8 steps with CG / Min [email or web address]   - Regular + thin. Expressive aphasia. Mild dysarthria. Pending cognitive evaluation.   - goals: Ambulate to the bathroom with supervision and supervision with transfer.   - target dc home 1/31.   - caregiver training

## 2023-01-19 NOTE — PROGRESS NOTE ADULT - SUBJECTIVE AND OBJECTIVE BOX
Patient is a 77y old  Female who presents with a chief complaint of CVA     Patient seen and examined at bedside. no new complaints, eating breakfast    Vital Signs Last 24 Hrs  T(C): 36.6 (19 Jan 2023 08:25), Max: 36.9 (18 Jan 2023 18:00)  T(F): 97.8 (19 Jan 2023 08:25), Max: 98.5 (18 Jan 2023 18:00)  HR: 72 (19 Jan 2023 08:25) (72 - 85)  BP: 163/81 (19 Jan 2023 08:25) (154/76 - 185/63)  BP(mean): --  RR: 14 (19 Jan 2023 08:25) (14 - 16)  SpO2: 94% (19 Jan 2023 08:25) (94% - 98%)    Parameters below as of 19 Jan 2023 08:25  Patient On (Oxygen Delivery Method): room air    GENERAL- NAD  EAR/NOSE/MOUTH/THROAT - no pharyngeal exudates, no oral leisions,  MMM  EYES- ERI, conjunctiva and Sclera clear  NECK- supple  RESPIRATORY-  clear to auscultation bilaterally, non laboured breathing  CARDIOVASCULAR - SIS2, RRR  GI - soft NT BS present  EXTREMITIES- no pedal edema  NEUROLOGY- no gross focal deficits  PSYCHIATRY- AAO X 2                13.6                 137  | 28   | 19           6.00  >-----------< 268     ------------------------< 111                   41.5                 4.2  | 101  | 1.19                                         Ca 9.0   Mg x     Ph x            MEDICATIONS  (STANDING):  amLODIPine   Tablet 10 milliGRAM(s) Oral daily  aspirin enteric coated 81 milliGRAM(s) Oral daily  atorvastatin 80 milliGRAM(s) Oral at bedtime  clopidogrel Tablet 75 milliGRAM(s) Oral daily  enoxaparin Injectable 40 milliGRAM(s) SubCutaneous every 24 hours  polyethylene glycol 3350 17 Gram(s) Oral daily  senna 2 Tablet(s) Oral at bedtime    MEDICATIONS  (PRN):  bisacodyl 5 milliGRAM(s) Oral every 12 hours PRN Constipation  ondansetron   Disintegrating Tablet 4 milliGRAM(s) Oral three times a day PRN Nausea and/or Vomiting

## 2023-01-20 PROCEDURE — 99232 SBSQ HOSP IP/OBS MODERATE 35: CPT

## 2023-01-20 RX ADMIN — Medication 81 MILLIGRAM(S): at 11:19

## 2023-01-20 RX ADMIN — AMLODIPINE BESYLATE 10 MILLIGRAM(S): 2.5 TABLET ORAL at 05:20

## 2023-01-20 RX ADMIN — ENOXAPARIN SODIUM 40 MILLIGRAM(S): 100 INJECTION SUBCUTANEOUS at 21:29

## 2023-01-20 RX ADMIN — ATORVASTATIN CALCIUM 80 MILLIGRAM(S): 80 TABLET, FILM COATED ORAL at 21:28

## 2023-01-20 RX ADMIN — CLOPIDOGREL BISULFATE 75 MILLIGRAM(S): 75 TABLET, FILM COATED ORAL at 11:19

## 2023-01-20 NOTE — PROGRESS NOTE ADULT - SUBJECTIVE AND OBJECTIVE BOX
HPI  This is a 77-year-old right-handed female patient with PMH  of HTN who presented to [place]  on 1/11 with expressive aphasia since 1/8. Daughter found the patient in her bedroom. She was trying to tell her daughter that she had trouble speaking. She knew what she wanted to say, but couldn't find the words to express herself. The daughter reports she had a similar episode in the past and was found to have a UTI. She was treated with antibiotics and symptoms resolved. She took her to urgent care since she thought this was a UTI and they prescribed her antibiotics. Patient did not improve with antibiotics, which prompted them to come to the ED. Patient is noncompliant with blood pressure medication.  At baseline, patient occasionally requires assistance to ambulate, but refuses to use walker. She requires some assistance with ADLs. NIHSS 4, pre-mRS 2. MRI showed Left parietal acute infarct.    Patient started on ASA 81mg indefinitely and Plavix for 3 months per ALIN. Patient with a stable large hematoma on her RLQ abdomen for which chemical DVT prophylaxis was not started. Duplex of the lower extremities neg.  and pt started on atorvastatin 80mg daily (titrate to LDL <70). Cardiology consulted, -160s and started on amlodipine 5mg daily. ILR placed on 1/13/23 to monitor for arrhythmias.    Patient was evaluated by PM&R and therapy for functional deficits, gait/ADL impairments and acute rehabilitation was recommended. Patient was medically optimized for discharge to [facility] IRU on 1/17/2022.    SUBJECTIVE  Patient seen and examined while participating on Occupational Therapy at gym.  No events overnight.   BP control with improvement after Norvasc increase - Will follow  Patient denies any headaches, no new deficits. Denies any dizziness while in therapy.    Denies any pain at this time.   Denies any-other complaints at this time.   Participating in therapy     ___________________________________________________________________________________________    Vital Signs Last 24 Hrs  T(C): 36.9 (20 Jan 2023 08:41), Max: 36.9 (20 Jan 2023 08:41)  T(F): 98.5 (20 Jan 2023 08:41), Max: 98.5 (20 Jan 2023 08:41)  HR: 72 (20 Jan 2023 08:41) (72 - 80)  BP: 148/83 (20 Jan 2023 08:41) (120/56 - 148/83)  RR: 18 (20 Jan 2023 08:41) (16 - 18)  SpO2: 95% (20 Jan 2023 08:41) (95% - 97%)    ___________________________________________________________________________________________    LAB                        13.6   6.00  )-----------( 268      ( 19 Jan 2023 06:30 )             41.5     01-19    137  |  101  |  19  ----------------------------<  111<H>  4.2   |  28  |  1.19    Ca    9.0      19 Jan 2023 06:30    TPro  7.5  /  Alb  3.3  /  TBili  0.5  /  DBili  x   /  AST  33  /  ALT  53<H>  /  AlkPhos  86  01-19      ___________________________________________________________________________________________    MEDICATIONS  (STANDING):  amLODIPine   Tablet 10 milliGRAM(s) Oral daily  aspirin enteric coated 81 milliGRAM(s) Oral daily  atorvastatin 80 milliGRAM(s) Oral at bedtime  clopidogrel Tablet 75 milliGRAM(s) Oral daily  enoxaparin Injectable 40 milliGRAM(s) SubCutaneous every 24 hours  polyethylene glycol 3350 17 Gram(s) Oral daily  senna 2 Tablet(s) Oral at bedtime    MEDICATIONS  (PRN):  bisacodyl 5 milliGRAM(s) Oral every 12 hours PRN Constipation  ondansetron   Disintegrating Tablet 4 milliGRAM(s) Oral three times a day PRN Nausea and/or Vomiting    ___________________________________________________________________________________________    Review of Systems:   Constitutional: No fever, No Chills, No fatigue  HEENT: No eye pain, No visual disturbances, No difficulty hearing  Pulm: No cough,  No shortness of breath  Cardio: No chest pain, No palpitations  GI:  No abdominal pain, No nausea, No vomiting, No diarrhea, No constipation  : No dysuria, No frequency, No hematuria  Neuro: No headaches, No memory loss, No loss of strength, No numbness, No tremors  Skin: No itching, No rashes, No lesions   MSK: No joint pain, No joint swelling, No muscle pain, No Neck or back pain    ___________________________________________________________________________________________    Physical Exam:   Constitutional - NAD, Comfortable  HEENT - NCAT, EOMI  Chest - good chest expansion, good respiratory effort, CTAB + loop recorder site c/d/i.   Cardio - warm and well perfused.  Abdomen -  Soft, NTND  Extremities - No peripheral edema, No calf tenderness   Neurologic Exam:                    Cognitive -             Orientation: Awake, Alert, AAO to self, place, date, year, situation            Attention:  Able to repeat. Able to recall 1/3 objects after 5 mins. Unable to do serial 7's.             Memory: Recent  memory intact            Thought: process, content appropriate     Speech - Expressive aphasia, Comprehensible, Mild dysarthria, able to repeat.      Cranial Nerves - No facial asymmetry, Tongue midline, Shoulder shrug intact     Motor -                      LEFT    UE - ShAB 5/5, EF 5/5, EE 5/5, WE 5/5,  WNL                    RIGHT UE - ShAB 5/5, EF 5/5, EE 5/5, WE 5/5,  WNL                    LEFT    LE - HF 4/5, KE 5/5, DF 5/5, PF 5/5                    RIGHT LE - HF 4/5, KE 5/5, DF 5/5, PF 5/5        Sensory - Intact to LT bilateral  Psychiatric - Mood stable, Affect WNL  Skin: Hematoma noted on the RLQ and at left breast medial to the areola improving

## 2023-01-20 NOTE — PROGRESS NOTE ADULT - ASSESSMENT
ASSESSMENT/PLAN  This is a 78 YO  woman with PMH  of HTN who presented to [place]  on 1/11 with expressive aphasia since 1/8. MRI showed Left parietal acute infarct. She was also found to have stable large hematoma on her RLQ abdomen. Patient now with gait Instability, ADL impairments and Functional impairments.    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  - aphasia  - dysarthia  - dysphagia  - Continue Comprehensive Rehab Program: PT/OT/ST, 3hours daily and 5 days weekly  - PT: Focused on improving strength, endurance, coordination, balance, functional mobility, and transfers  - OT: Focused on improving strength, fine motor skills, coordination, posture and ADLs.    - ST: to diagnose and treat deficits in swallowing, cognition and communication.   - c/w aspirin, plavix and high dose statin  - ILR placed on 1/13/23     #HTN  - Amlodipine increased to 10mg daily.     #HLD  - Lipitor 80mg daily     #Hematoma  - stable hematoma on her RLQ abdomen and the Left Breast.   - Continue to monitor, as the patient was restarted on the Lovenox     #Pain management  - Tylenol PRN    #DVT ppx  - Lovenox   - last dopplers: Negative for DVT on 1/15    #Bowel Regimen  - Senna, miralax     #Bladder management  - BS on admission, and q 8 hours (SC if > 400)  - Monitor UO    #FEN   - Diet: DASH     #Skin:  - Skin on admission: Large hematoma on her RLQ abdomen and the Left Breast.   - Pressure injury/Skin: Turn Q2hrs while in bed, OOB to Chair, PT/OT     #Dysphagia    - SLP: evaluation and treatment    #Sleep:   - Maintain quiet hours and low stim environment.  - Melatonin PRN to maximize participation in therapy during the day.     #Precaution  - Fall, Aspiration    #GOC  CODE STATUS: FULL CODE       IDTR     # Case discussed in IDT rounds 1/19  - She lives in apartment with 2 steps and has support from 2 daughter.   - She is setup for grooming and eating. CG for UBD and LBD. Min assist for toileting. Goal   - She is walking 70 feet with RW with min assist. she is able to do 8 steps with CG / Min [email or web address]   - Regular + thin. Expressive aphasia. Mild dysarthria. Pending cognitive evaluation.   - goals: Ambulate to the bathroom with supervision and supervision with transfer.   - target dc home 1/31.   - caregiver training

## 2023-01-20 NOTE — PROGRESS NOTE ADULT - ASSESSMENT
77 year old PMH HTN presented to [facility] on 1/11 after having expressive aphasia since 1/8, found to have L parietal CVA admitted to  rehab for ADL impairment     #CVA  #ADL impairment   - found to have acute Left parietal infarct with L distal M2 occlusion. 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

## 2023-01-20 NOTE — PROGRESS NOTE ADULT - SUBJECTIVE AND OBJECTIVE BOX
Patient is a 77y old  Female who presents with a chief complaint of CVA (20 Jan 2023 11:38)      Patient seen and examined at bedside. No events overnight. Patient denies headache, chest pain, sob, abd pain. Doing ok and thinks PT has been ok    ALLERGIES:  No Known Allergies    MEDICATIONS  (STANDING):  amLODIPine   Tablet 10 milliGRAM(s) Oral daily  aspirin enteric coated 81 milliGRAM(s) Oral daily  atorvastatin 80 milliGRAM(s) Oral at bedtime  clopidogrel Tablet 75 milliGRAM(s) Oral daily  enoxaparin Injectable 40 milliGRAM(s) SubCutaneous every 24 hours  polyethylene glycol 3350 17 Gram(s) Oral daily  senna 2 Tablet(s) Oral at bedtime    MEDICATIONS  (PRN):  bisacodyl 5 milliGRAM(s) Oral every 12 hours PRN Constipation  ondansetron   Disintegrating Tablet 4 milliGRAM(s) Oral three times a day PRN Nausea and/or Vomiting    Vital Signs Last 24 Hrs  T(F): 98.5 (20 Jan 2023 08:41), Max: 98.5 (20 Jan 2023 08:41)  HR: 72 (20 Jan 2023 08:41) (72 - 80)  BP: 148/83 (20 Jan 2023 08:41) (120/56 - 148/83)  RR: 18 (20 Jan 2023 08:41) (16 - 18)  SpO2: 95% (20 Jan 2023 08:41) (95% - 97%)  I&O's Summary      PHYSICAL EXAM:  GENERAL: NAD, sitting in chair  HEAD:  Atraumatic, Normocephalic  EYES: PERRL, conjunctiva and sclera clear  ENMT: Moist mucous membranes, Good dentition  CHEST/LUNG: Clear to auscultation bilaterally, non-labored breathing, good air entry  HEART: RRR; S1/S2, No murmur  ABDOMEN: Soft, Nontender, Nondistended; Bowel sounds present, large hematoma noted on RLQ   EXTREMITIES: No cyanosis, No edema  SKIN: Warm, perfused   PSYCH: Normal mood and affect    LABS:                        13.6   6.00  )-----------( 268      ( 19 Jan 2023 06:30 )             41.5     01-19    137  |  101  |  19  ----------------------------<  111  4.2   |  28  |  1.19    Ca    9.0      19 Jan 2023 06:30    TPro  7.5  /  Alb  3.3  /  TBili  0.5  /  DBili  x   /  AST  33  /  ALT  53  /  AlkPhos  86  01-19 01-12 Chol 184 mg/dL LDL -- HDL 36 mg/dL Trig 78 mg/dL                      COVID-19 PCR: Bellatepita (01-17-23 @ 22:30)  COVID-19 PCR: Justin (01-16-23 @ 09:35)      RADIOLOGY & ADDITIONAL TESTS:    Care Discussed with Consultants/Other Providers:

## 2023-01-21 PROCEDURE — 99233 SBSQ HOSP IP/OBS HIGH 50: CPT

## 2023-01-21 PROCEDURE — 99232 SBSQ HOSP IP/OBS MODERATE 35: CPT

## 2023-01-21 RX ADMIN — AMLODIPINE BESYLATE 10 MILLIGRAM(S): 2.5 TABLET ORAL at 05:26

## 2023-01-21 RX ADMIN — CLOPIDOGREL BISULFATE 75 MILLIGRAM(S): 75 TABLET, FILM COATED ORAL at 11:53

## 2023-01-21 RX ADMIN — ATORVASTATIN CALCIUM 80 MILLIGRAM(S): 80 TABLET, FILM COATED ORAL at 21:00

## 2023-01-21 RX ADMIN — Medication 81 MILLIGRAM(S): at 11:53

## 2023-01-21 RX ADMIN — ENOXAPARIN SODIUM 40 MILLIGRAM(S): 100 INJECTION SUBCUTANEOUS at 21:00

## 2023-01-21 NOTE — PROGRESS NOTE ADULT - SUBJECTIVE AND OBJECTIVE BOX
Patient is a 77y old  Female who presents with a chief complaint of CVA (20 Jan 2023 13:59)      INTERVAL History of Present Illness/OVERNIGHT EVENTS: stable    MEDICATIONS  (STANDING):  amLODIPine   Tablet 10 milliGRAM(s) Oral daily  aspirin enteric coated 81 milliGRAM(s) Oral daily  atorvastatin 80 milliGRAM(s) Oral at bedtime  clopidogrel Tablet 75 milliGRAM(s) Oral daily  enoxaparin Injectable 40 milliGRAM(s) SubCutaneous every 24 hours  polyethylene glycol 3350 17 Gram(s) Oral daily  senna 2 Tablet(s) Oral at bedtime    MEDICATIONS  (PRN):  bisacodyl 5 milliGRAM(s) Oral every 12 hours PRN Constipation  ondansetron   Disintegrating Tablet 4 milliGRAM(s) Oral three times a day PRN Nausea and/or Vomiting      Allergies    No Known Allergies    Intolerances        REVIEW OF SYSTEMS:  Other systems currently negative unless otherwise specified above.    Vital Signs Last 24 Hrs  T(C): 37.1 (21 Jan 2023 10:16), Max: 37.1 (21 Jan 2023 10:16)  T(F): 98.7 (21 Jan 2023 10:16), Max: 98.7 (21 Jan 2023 10:16)  HR: 78 (21 Jan 2023 10:16) (72 - 84)  BP: 135/74 (21 Jan 2023 10:16) (102/68 - 145/73)  BP(mean): --  RR: 17 (21 Jan 2023 10:16) (16 - 17)  SpO2: 98% (21 Jan 2023 10:16) (98% - 98%)    Parameters below as of 21 Jan 2023 10:16  Patient On (Oxygen Delivery Method): room air        Height (cm): 152.4 (01-20 @ 11:38)  Weight (kg): 68.9 (01-20 @ 11:38)  BMI (kg/m2): 29.7 (01-20 @ 11:38)  BSA (m2): 1.66 (01-20 @ 11:38)    PHYSICAL EXAM:  GENERAL: No apparent distress, appears stated age  EYES: Conjunctiva and sclera clear, no discharge  ENMT: Moist mucous membranes, no nasal discharge  CHEST/LUNG: Clear to auscultation bilaterally, no wheeze or rales  HEART: Regular rhythm, no rubs or gallops  ABDOMEN: Soft, Nontender, Nondistended  EXTREMITIES:  No clubbing, cyanosis or edema      LABS:              CAPILLARY BLOOD GLUCOSE        Height (cm): 152.4 (01-20 @ 11:38)  Weight (kg): 68.9 (01-20 @ 11:38)  BMI (kg/m2): 29.7 (01-20 @ 11:38)  BSA (m2): 1.66 (01-20 @ 11:38)    RADIOLOGY & ADDITIONAL TESTS:      Images reviewed personally    Consultant Notes Reviewed and Care Discussed with relevant Consultants.

## 2023-01-21 NOTE — PROGRESS NOTE ADULT - ASSESSMENT
77 year old PMH HTN presented to [facility] on 1/11 after having expressive aphasia since 1/8, found to have L parietal CVA admitted to  rehab for ADL impairment.    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

## 2023-01-21 NOTE — PROGRESS NOTE ADULT - ASSESSMENT
ASSESSMENT/PLAN  This is a 76 YO  woman with PMH  of HTN who presented to [place]  on 1/11 with expressive aphasia since 1/8. MRI showed Left parietal acute infarct. She was also found to have stable large hematoma on her RLQ abdomen. Patient now with gait Instability, ADL impairments and Functional impairments.    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  - aphasia  - dysarthia  - dysphagia  - Continue Comprehensive Rehab Program: PT/OT/ST, 3hours daily and 5 days weekly  - PT: Focused on improving strength, endurance, coordination, balance, functional mobility, and transfers  - OT: Focused on improving strength, fine motor skills, coordination, posture and ADLs.    - ST: to diagnose and treat deficits in swallowing, cognition and communication.   - c/w aspirin, plavix and high dose statin  - ILR placed on 1/13/23     #HTN  - Amlodipine increased to 10mg daily.     #HLD  - Lipitor 80mg daily     #Hematoma  - stable hematoma on her RLQ abdomen and the Left Breast.   - Continue to monitor, as the patient was restarted on the Lovenox     #Pain management  - Tylenol PRN    #DVT ppx  - Lovenox   - last dopplers: Negative for DVT on 1/15    #Bowel Regimen  - Senna, miralax     #Bladder management  - BS on admission, and q 8 hours (SC if > 400)  - Monitor UO    #FEN   - Diet: DASH     #Skin:  - Skin on admission: Large hematoma on her RLQ abdomen and the Left Breast.   - Pressure injury/Skin: Turn Q2hrs while in bed, OOB to Chair, PT/OT     #Dysphagia    - SLP: evaluation and treatment    #Sleep:   - Maintain quiet hours and low stim environment.  - Melatonin PRN to maximize participation in therapy during the day.     #Precaution  - Fall, Aspiration    #GOC  CODE STATUS: FULL CODE       IDTR     # Case discussed in IDT rounds 1/19  - She lives in apartment with 2 steps and has support from 2 daughter.   - She is setup for grooming and eating. CG for UBD and LBD. Min assist for toileting. Goal   - She is walking 70 feet with RW with min assist. she is able to do 8 steps with CG / Min [email or web address]   - Regular + thin. Expressive aphasia. Mild dysarthria. Pending cognitive evaluation.   - goals: Ambulate to the bathroom with supervision and supervision with transfer.   - target dc home 1/31.   - caregiver training

## 2023-01-21 NOTE — PROGRESS NOTE ADULT - SUBJECTIVE AND OBJECTIVE BOX
HPI  This is a 77-year-old right-handed female patient with PMH  of HTN who presented to [place]  on 1/11 with expressive aphasia since 1/8. Daughter found the patient in her bedroom. She was trying to tell her daughter that she had trouble speaking. She knew what she wanted to say, but couldn't find the words to express herself. The daughter reports she had a similar episode in the past and was found to have a UTI. She was treated with antibiotics and symptoms resolved. She took her to urgent care since she thought this was a UTI and they prescribed her antibiotics. Patient did not improve with antibiotics, which prompted them to come to the ED. Patient is noncompliant with blood pressure medication.  At baseline, patient occasionally requires assistance to ambulate, but refuses to use walker. She requires some assistance with ADLs. NIHSS 4, pre-mRS 2. MRI showed Left parietal acute infarct.    Patient started on ASA 81mg indefinitely and Plavix for 3 months per ALIN. Patient with a stable large hematoma on her RLQ abdomen for which chemical DVT prophylaxis was not started. Duplex of the lower extremities neg.  and pt started on atorvastatin 80mg daily (titrate to LDL <70). Cardiology consulted, -160s and started on amlodipine 5mg daily. ILR placed on 1/13/23 to monitor for arrhythmias.    Patient was evaluated by PM&R and therapy for functional deficits, gait/ADL impairments and acute rehabilitation was recommended. Patient was medically optimized for discharge to [facility] IRU on 1/17/2022.    TDD - 1/31 Home  ___________________________________________________________________________________________    SUBJECTIVE  Patient seen and examined at bedside  No events overnight.   Denies any pain at this time.   Denies any-other complaints at this time.   Participating in therapy     ___________________________________________________________________________________________    Vital Signs Last 24 Hrs  T(C): 37.1 (21 Jan 2023 10:16), Max: 37.1 (21 Jan 2023 10:16)  T(F): 98.7 (21 Jan 2023 10:16), Max: 98.7 (21 Jan 2023 10:16)  HR: 78 (21 Jan 2023 10:16) (72 - 84)  BP: 135/74 (21 Jan 2023 10:16) (102/68 - 145/73)  RR: 17 (21 Jan 2023 10:16) (16 - 17)  SpO2: 98% (21 Jan 2023 10:16) (98% - 98%)    ___________________________________________________________________________________________    LAB                        13.6   6.00  )-----------( 268      ( 19 Jan 2023 06:30 )             41.5     01-19    137  |  101  |  19  ----------------------------<  111<H>  4.2   |  28  |  1.19    Ca    9.0      19 Jan 2023 06:30    TPro  7.5  /  Alb  3.3  /  TBili  0.5  /  DBili  x   /  AST  33  /  ALT  53<H>  /  AlkPhos  86  01-19      ___________________________________________________________________________________________    MEDICATIONS  (STANDING):  amLODIPine   Tablet 10 milliGRAM(s) Oral daily  aspirin enteric coated 81 milliGRAM(s) Oral daily  atorvastatin 80 milliGRAM(s) Oral at bedtime  clopidogrel Tablet 75 milliGRAM(s) Oral daily  enoxaparin Injectable 40 milliGRAM(s) SubCutaneous every 24 hours  polyethylene glycol 3350 17 Gram(s) Oral daily  senna 2 Tablet(s) Oral at bedtime    MEDICATIONS  (PRN):  bisacodyl 5 milliGRAM(s) Oral every 12 hours PRN Constipation  ondansetron   Disintegrating Tablet 4 milliGRAM(s) Oral three times a day PRN Nausea and/or Vomiting    ___________________________________________________________________________________________    Review of Systems:   Constitutional: No fever, No Chills, No fatigue  HEENT: No eye pain, No visual disturbances, No difficulty hearing  Pulm: No cough,  No shortness of breath  Cardio: No chest pain, No palpitations  GI:  No abdominal pain, No nausea, No vomiting, No diarrhea, No constipation  : No dysuria, No frequency, No hematuria  Neuro: No headaches, No memory loss, No loss of strength, No numbness, No tremors  Skin: No itching, No rashes, No lesions   MSK: No joint pain, No joint swelling, No muscle pain, No Neck or back pain    ___________________________________________________________________________________________    Physical Exam:   Constitutional - NAD, Comfortable  HEENT - NCAT, EOMI  Chest - good chest expansion, good respiratory effort, CTAB + loop recorder site c/d/i.   Cardio - warm and well perfused.  Abdomen -  Soft, NTND  Extremities - No peripheral edema, No calf tenderness   Neurologic Exam:                    Cognitive -             Orientation: Awake, Alert, AAO to self, place, date, year, situation            Attention:  Able to repeat. Able to recall 1/3 objects after 5 mins. Unable to do serial 7's.             Memory: Recent  memory intact            Thought: process, content appropriate     Speech - Expressive aphasia, Comprehensible, Mild dysarthria, able to repeat.      Cranial Nerves - No facial asymmetry, Tongue midline, Shoulder shrug intact     Motor -                      LEFT    UE - ShAB 5/5, EF 5/5, EE 5/5, WE 5/5,  WNL                    RIGHT UE - ShAB 5/5, EF 5/5, EE 5/5, WE 5/5,  WNL                    LEFT    LE - HF 4/5, KE 5/5, DF 5/5, PF 5/5                    RIGHT LE - HF 4/5, KE 5/5, DF 5/5, PF 5/5        Sensory - Intact to LT bilateral  Psychiatric - Mood stable, Affect WNL  Skin: Hematoma noted on the RLQ and at left breast medial to the areola improving

## 2023-01-22 PROCEDURE — 99232 SBSQ HOSP IP/OBS MODERATE 35: CPT

## 2023-01-22 RX ADMIN — Medication 81 MILLIGRAM(S): at 11:13

## 2023-01-22 RX ADMIN — AMLODIPINE BESYLATE 10 MILLIGRAM(S): 2.5 TABLET ORAL at 05:49

## 2023-01-22 RX ADMIN — CLOPIDOGREL BISULFATE 75 MILLIGRAM(S): 75 TABLET, FILM COATED ORAL at 11:14

## 2023-01-22 RX ADMIN — SENNA PLUS 2 TABLET(S): 8.6 TABLET ORAL at 21:53

## 2023-01-22 RX ADMIN — ATORVASTATIN CALCIUM 80 MILLIGRAM(S): 80 TABLET, FILM COATED ORAL at 21:54

## 2023-01-22 RX ADMIN — ENOXAPARIN SODIUM 40 MILLIGRAM(S): 100 INJECTION SUBCUTANEOUS at 21:58

## 2023-01-22 NOTE — PROGRESS NOTE ADULT - SUBJECTIVE AND OBJECTIVE BOX
Patient is a 77y old  Female who presents with a chief complaint of CVA (21 Jan 2023 11:11)      INTERVAL History of Present Illness/OVERNIGHT EVENTS: no overnight issues    MEDICATIONS  (STANDING):  amLODIPine   Tablet 10 milliGRAM(s) Oral daily  aspirin enteric coated 81 milliGRAM(s) Oral daily  atorvastatin 80 milliGRAM(s) Oral at bedtime  clopidogrel Tablet 75 milliGRAM(s) Oral daily  enoxaparin Injectable 40 milliGRAM(s) SubCutaneous every 24 hours  polyethylene glycol 3350 17 Gram(s) Oral daily  senna 2 Tablet(s) Oral at bedtime    MEDICATIONS  (PRN):  bisacodyl 5 milliGRAM(s) Oral every 12 hours PRN Constipation  ondansetron   Disintegrating Tablet 4 milliGRAM(s) Oral three times a day PRN Nausea and/or Vomiting      Allergies    No Known Allergies    Intolerances        REVIEW OF SYSTEMS:  Other systems currently negative unless otherwise specified above.    Vital Signs Last 24 Hrs  T(C): 36.9 (22 Jan 2023 09:13), Max: 36.9 (21 Jan 2023 20:31)  T(F): 98.5 (22 Jan 2023 09:13), Max: 98.5 (22 Jan 2023 09:13)  HR: 68 (22 Jan 2023 09:13) (68 - 76)  BP: 144/77 (22 Jan 2023 09:13) (144/77 - 150/69)  BP(mean): --  RR: 17 (22 Jan 2023 09:13) (16 - 17)  SpO2: 96% (22 Jan 2023 09:13) (95% - 96%)    Parameters below as of 22 Jan 2023 09:13  Patient On (Oxygen Delivery Method): room air            PHYSICAL EXAM:  GENERAL: No apparent distress, appears stated age  EYES: Conjunctiva and sclera clear, no discharge  ENMT: Moist mucous membranes, no nasal discharge  CHEST/LUNG: Clear to auscultation bilaterally, no wheeze or rales  HEART: Regular rhythm, no rubs or gallops  ABDOMEN: Soft, Nontender, Nondistended  EXTREMITIES:  No clubbing, cyanosis or edema  SKIN: Bruising RLQ abdomen      LABS:              CAPILLARY BLOOD GLUCOSE            RADIOLOGY & ADDITIONAL TESTS:      Images reviewed personally    Consultant Notes Reviewed and Care Discussed with relevant Consultants.

## 2023-01-22 NOTE — PROGRESS NOTE ADULT - SUBJECTIVE AND OBJECTIVE BOX
HPI  This is a 77-year-old right-handed female patient with PMH  of HTN who presented to [place]  on 1/11 with expressive aphasia since 1/8. Daughter found the patient in her bedroom. She was trying to tell her daughter that she had trouble speaking. She knew what she wanted to say, but couldn't find the words to express herself. The daughter reports she had a similar episode in the past and was found to have a UTI. She was treated with antibiotics and symptoms resolved. She took her to urgent care since she thought this was a UTI and they prescribed her antibiotics. Patient did not improve with antibiotics, which prompted them to come to the ED. Patient is noncompliant with blood pressure medication.  At baseline, patient occasionally requires assistance to ambulate, but refuses to use walker. She requires some assistance with ADLs. NIHSS 4, pre-mRS 2. MRI showed Left parietal acute infarct.    Patient started on ASA 81mg indefinitely and Plavix for 3 months per ALIN. Patient with a stable large hematoma on her RLQ abdomen for which chemical DVT prophylaxis was not started. Duplex of the lower extremities neg.  and pt started on atorvastatin 80mg daily (titrate to LDL <70). Cardiology consulted, -160s and started on amlodipine 5mg daily. ILR placed on 1/13/23 to monitor for arrhythmias.    Patient was evaluated by PM&R and therapy for functional deficits, gait/ADL impairments and acute rehabilitation was recommended. Patient was medically optimized for discharge to [facility] IRU on 1/17/2022.    TDD - 1/31 Home  ___________________________________________________________________________________________    SUBJECTIVE  Patient seen and examined at bedside  No events overnight.   Denies any pain at this time.   Denies any-other complaints at this time.   Participating in therapy     ___________________________________________________________________________________________    Vital Signs Last 24 Hrs  T(C): 36.9 (22 Jan 2023 09:13), Max: 36.9 (21 Jan 2023 20:31)  T(F): 98.5 (22 Jan 2023 09:13), Max: 98.5 (22 Jan 2023 09:13)  HR: 68 (22 Jan 2023 09:13) (68 - 76)  BP: 144/77 (22 Jan 2023 09:13) (144/77 - 150/69)  RR: 17 (22 Jan 2023 09:13) (16 - 17)  SpO2: 96% (22 Jan 2023 09:13) (95% - 96%)    ___________________________________________________________________________________________    MEDICATIONS  (STANDING):  amLODIPine   Tablet 10 milliGRAM(s) Oral daily  aspirin enteric coated 81 milliGRAM(s) Oral daily  atorvastatin 80 milliGRAM(s) Oral at bedtime  clopidogrel Tablet 75 milliGRAM(s) Oral daily  enoxaparin Injectable 40 milliGRAM(s) SubCutaneous every 24 hours  polyethylene glycol 3350 17 Gram(s) Oral daily  senna 2 Tablet(s) Oral at bedtime    MEDICATIONS  (PRN):  bisacodyl 5 milliGRAM(s) Oral every 12 hours PRN Constipation  ondansetron   Disintegrating Tablet 4 milliGRAM(s) Oral three times a day PRN Nausea and/or Vomiting    ___________________________________________________________________________________________    Review of Systems:   Constitutional: No fever, No Chills, No fatigue  HEENT: No eye pain, No visual disturbances, No difficulty hearing  Pulm: No cough,  No shortness of breath  Cardio: No chest pain, No palpitations  GI:  No abdominal pain, No nausea, No vomiting, No diarrhea, No constipation  : No dysuria, No frequency, No hematuria  Neuro: No headaches, No memory loss, No loss of strength, No numbness, No tremors  Skin: No itching, No rashes, No lesions   MSK: No joint pain, No joint swelling, No muscle pain, No Neck or back pain    ___________________________________________________________________________________________    Physical Exam:   Constitutional - NAD, Comfortable  HEENT - NCAT, EOMI  Chest - good chest expansion, good respiratory effort, CTAB + loop recorder site c/d/i.   Cardio - warm and well perfused.  Abdomen -  Soft, NTND  Extremities - No peripheral edema, No calf tenderness   Neurologic Exam:                    Cognitive -             Orientation: Awake, Alert, AAO to self, place, date, year, situation            Attention:  Able to repeat. Able to recall 1/3 objects after 5 mins. Unable to do serial 7's.             Memory: Recent  memory intact            Thought: process, content appropriate     Speech - Expressive aphasia, Comprehensible, Mild dysarthria, able to repeat.      Cranial Nerves - No facial asymmetry, Tongue midline, Shoulder shrug intact     Motor -                      LEFT    UE - ShAB 5/5, EF 5/5, EE 5/5, WE 5/5,  WNL                    RIGHT UE - ShAB 5/5, EF 5/5, EE 5/5, WE 5/5,  WNL                    LEFT    LE - HF 4/5, KE 5/5, DF 5/5, PF 5/5                    RIGHT LE - HF 4/5, KE 5/5, DF 5/5, PF 5/5        Sensory - Intact to LT bilateral  Psychiatric - Mood stable, Affect WNL  Skin: Hematoma noted on the RLQ and at left breast medial to the areola improving

## 2023-01-22 NOTE — PROGRESS NOTE ADULT - ASSESSMENT
77 year old PMH HTN presented to [facility] on 1/11 after having expressive aphasia since 1/8, found to have L parietal CVA admitted to  rehab for ADL impairment.    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

## 2023-01-22 NOTE — PROGRESS NOTE ADULT - ASSESSMENT
ASSESSMENT/PLAN  This is a 76 YO  woman with PMH  of HTN who presented to [place]  on 1/11 with expressive aphasia since 1/8. MRI showed Left parietal acute infarct. She was also found to have stable large hematoma on her RLQ abdomen. Patient now with gait Instability, ADL impairments and Functional impairments.    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  - aphasia  - dysarthia  - dysphagia  - Continue Comprehensive Rehab Program: PT/OT/ST, 3hours daily and 5 days weekly  - PT: Focused on improving strength, endurance, coordination, balance, functional mobility, and transfers  - OT: Focused on improving strength, fine motor skills, coordination, posture and ADLs.    - ST: to diagnose and treat deficits in swallowing, cognition and communication.   - c/w aspirin, plavix and high dose statin  - ILR placed on 1/13/23     #HTN  - Amlodipine increased to 10mg daily.     #HLD  - Lipitor 80mg daily     #Hematoma  - stable hematoma on her RLQ abdomen and the Left Breast.   - Continue to monitor, as the patient was restarted on the Lovenox     #Pain management  - Tylenol PRN    #DVT ppx  - Lovenox   - last dopplers: Negative for DVT on 1/15    #Bowel Regimen  - Senna, miralax     #Bladder management  - BS on admission, and q 8 hours (SC if > 400)  - Monitor UO    #FEN   - Diet: DASH     #Skin:  - Skin on admission: Large hematoma on her RLQ abdomen and the Left Breast.   - Pressure injury/Skin: Turn Q2hrs while in bed, OOB to Chair, PT/OT     #Dysphagia    - SLP: evaluation and treatment    #Sleep:   - Maintain quiet hours and low stim environment.  - Melatonin PRN to maximize participation in therapy during the day.     #Precaution  - Fall, Aspiration    #GOC  CODE STATUS: FULL CODE       IDTR     # Case discussed in IDT rounds 1/19  - She lives in apartment with 2 steps and has support from 2 daughter.   - She is setup for grooming and eating. CG for UBD and LBD. Min assist for toileting. Goal   - She is walking 70 feet with RW with min assist. she is able to do 8 steps with CG / Min [email or web address]   - Regular + thin. Expressive aphasia. Mild dysarthria. Pending cognitive evaluation.   - goals: Ambulate to the bathroom with supervision and supervision with transfer.   - target dc home 1/31.   - caregiver training

## 2023-01-23 LAB
ALBUMIN SERPL ELPH-MCNC: 3.3 G/DL — SIGNIFICANT CHANGE UP (ref 3.3–5)
ALP SERPL-CCNC: 93 U/L — SIGNIFICANT CHANGE UP (ref 40–120)
ALT FLD-CCNC: 47 U/L — HIGH (ref 10–45)
ANION GAP SERPL CALC-SCNC: 9 MMOL/L — SIGNIFICANT CHANGE UP (ref 5–17)
APPEARANCE UR: CLEAR — SIGNIFICANT CHANGE UP
AST SERPL-CCNC: 36 U/L — SIGNIFICANT CHANGE UP (ref 10–40)
BILIRUB SERPL-MCNC: 0.6 MG/DL — SIGNIFICANT CHANGE UP (ref 0.2–1.2)
BILIRUB UR-MCNC: NEGATIVE — SIGNIFICANT CHANGE UP
BUN SERPL-MCNC: 19 MG/DL — SIGNIFICANT CHANGE UP (ref 7–23)
CALCIUM SERPL-MCNC: 9.1 MG/DL — SIGNIFICANT CHANGE UP (ref 8.4–10.5)
CHLORIDE SERPL-SCNC: 103 MMOL/L — SIGNIFICANT CHANGE UP (ref 96–108)
CO2 SERPL-SCNC: 27 MMOL/L — SIGNIFICANT CHANGE UP (ref 22–31)
COLOR SPEC: YELLOW — SIGNIFICANT CHANGE UP
CREAT SERPL-MCNC: 1.08 MG/DL — SIGNIFICANT CHANGE UP (ref 0.5–1.3)
DIFF PNL FLD: NEGATIVE — SIGNIFICANT CHANGE UP
EGFR: 53 ML/MIN/1.73M2 — LOW
GLUCOSE SERPL-MCNC: 110 MG/DL — HIGH (ref 70–99)
GLUCOSE UR QL: NEGATIVE — SIGNIFICANT CHANGE UP
HCT VFR BLD CALC: 45.8 % — HIGH (ref 34.5–45)
HGB BLD-MCNC: 15.1 G/DL — SIGNIFICANT CHANGE UP (ref 11.5–15.5)
KETONES UR-MCNC: NEGATIVE — SIGNIFICANT CHANGE UP
LEUKOCYTE ESTERASE UR-ACNC: ABNORMAL
MCHC RBC-ENTMCNC: 29.5 PG — SIGNIFICANT CHANGE UP (ref 27–34)
MCHC RBC-ENTMCNC: 33 GM/DL — SIGNIFICANT CHANGE UP (ref 32–36)
MCV RBC AUTO: 89.5 FL — SIGNIFICANT CHANGE UP (ref 80–100)
NITRITE UR-MCNC: NEGATIVE — SIGNIFICANT CHANGE UP
NRBC # BLD: 0 /100 WBCS — SIGNIFICANT CHANGE UP (ref 0–0)
PH UR: 5 — SIGNIFICANT CHANGE UP (ref 5–8)
PLATELET # BLD AUTO: 254 K/UL — SIGNIFICANT CHANGE UP (ref 150–400)
POTASSIUM SERPL-MCNC: 4.4 MMOL/L — SIGNIFICANT CHANGE UP (ref 3.5–5.3)
POTASSIUM SERPL-SCNC: 4.4 MMOL/L — SIGNIFICANT CHANGE UP (ref 3.5–5.3)
PROT SERPL-MCNC: 7.8 G/DL — SIGNIFICANT CHANGE UP (ref 6–8.3)
PROT UR-MCNC: NEGATIVE — SIGNIFICANT CHANGE UP
RBC # BLD: 5.12 M/UL — SIGNIFICANT CHANGE UP (ref 3.8–5.2)
RBC # FLD: 11.9 % — SIGNIFICANT CHANGE UP (ref 10.3–14.5)
SODIUM SERPL-SCNC: 139 MMOL/L — SIGNIFICANT CHANGE UP (ref 135–145)
SP GR SPEC: 1.01 — SIGNIFICANT CHANGE UP (ref 1.01–1.02)
UROBILINOGEN FLD QL: NEGATIVE — SIGNIFICANT CHANGE UP
WBC # BLD: 6.08 K/UL — SIGNIFICANT CHANGE UP (ref 3.8–10.5)
WBC # FLD AUTO: 6.08 K/UL — SIGNIFICANT CHANGE UP (ref 3.8–10.5)

## 2023-01-23 PROCEDURE — 99232 SBSQ HOSP IP/OBS MODERATE 35: CPT

## 2023-01-23 RX ORDER — ACETAMINOPHEN 500 MG
650 TABLET ORAL EVERY 6 HOURS
Refills: 0 | Status: DISCONTINUED | OUTPATIENT
Start: 2023-01-23 | End: 2023-01-31

## 2023-01-23 RX ADMIN — AMLODIPINE BESYLATE 10 MILLIGRAM(S): 2.5 TABLET ORAL at 05:33

## 2023-01-23 RX ADMIN — Medication 650 MILLIGRAM(S): at 19:44

## 2023-01-23 RX ADMIN — ENOXAPARIN SODIUM 40 MILLIGRAM(S): 100 INJECTION SUBCUTANEOUS at 21:29

## 2023-01-23 RX ADMIN — Medication 81 MILLIGRAM(S): at 11:20

## 2023-01-23 RX ADMIN — Medication 650 MILLIGRAM(S): at 20:40

## 2023-01-23 RX ADMIN — CLOPIDOGREL BISULFATE 75 MILLIGRAM(S): 75 TABLET, FILM COATED ORAL at 11:20

## 2023-01-23 RX ADMIN — ATORVASTATIN CALCIUM 80 MILLIGRAM(S): 80 TABLET, FILM COATED ORAL at 21:29

## 2023-01-23 NOTE — PROGRESS NOTE ADULT - ASSESSMENT
77 year old PMH HTN presented to [facility] on 1/11 after having expressive aphasia since 1/8, found to have L parietal CVA admitted to  rehab for ADL impairment.    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 III  monitor kidney function, BMP, electrolytes  renally dose meds, avoid contrast and nsaids.    #DVT ppx  - lovenox    - monitor CBC

## 2023-01-23 NOTE — PROGRESS NOTE ADULT - ASSESSMENT
ASSESSMENT/PLAN  This is a 76 YO  woman with PMH  of HTN who presented to [place]  on 1/11 with expressive aphasia since 1/8. MRI showed Left parietal acute infarct. She was also found to have stable large hematoma on her RLQ abdomen. Patient now with gait Instability, ADL impairments and Functional impairments.    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  - Continue Comprehensive Rehab Program: PT/OT/ST, 3hours daily and 5 days weekly  - PT: Focused on improving strength, endurance, coordination, balance, functional mobility, and transfers  - OT: Focused on improving strength, fine motor skills, coordination, posture and ADLs.    - ST: to diagnose and treat deficits in swallowing, cognition and communication.   - c/w aspirin, plavix and high dose statin x3 months  - ILR placed on 1/13/23     #HTN  - Amlodipine 10mg daily. VSS    #HLD  - Lipitor 80mg daily, LFTs followed     #Hematoma  - stable hematoma on her RLQ abdomen and the Left Breast.   - Continue to monitor, as the patient was restarted on the Lovenox     #Pain management  - Tylenol PRN    #DVT ppx  - Lovenox   - last dopplers: Negative for DVT on 1/15    #Bowel Regimen  - Senna, miralax     #Bladder management  - BS on admission, and q 8 hours (SC if > 400)  - Monitor UO    #FEN   - Diet: DASH     #Skin:  - Skin on admission: Large hematoma on her RLQ abdomen and the Left Breast.   - Pressure injury/Skin: Turn Q2hrs while in bed, OOB to Chair, PT/OT     #Dysphagia    - SLP: evaluation and treatment    #Sleep:   - Maintain quiet hours and low stim environment.  - Melatonin PRN to maximize participation in therapy during the day.     #Precaution  - Fall, Aspiration    #GOC  CODE STATUS: FULL CODE       IDTR     # Case discussed in IDT rounds 1/19  - She lives in apartment with 2 steps and has support from 2 daughter.   - She is setup for grooming and eating. CG for UBD and LBD. Min assist for toileting. Goal   - She is walking 70 feet with RW with min assist. she is able to do 8 steps with CG / Min [email or web address]   - Regular + thin. Expressive aphasia. Mild dysarthria. Pending cognitive evaluation.   - goals: Ambulate to the bathroom with supervision and supervision with transfer.   - target dc home 1/31.   - caregiver training

## 2023-01-23 NOTE — PROGRESS NOTE ADULT - SUBJECTIVE AND OBJECTIVE BOX
CHIEF COMPLAINT: s/p CVA      HPI:  This is a 77-year-old right-handed female patient with PMH  of HTN who presented to [place]  on 1/11 with expressive aphasia since 1/8. Daughter found the patient in her bedroom. She was trying to tell her daughter that she had trouble speaking. She knew what she wanted to say, but couldn't find the words to express herself. The daughter reports she had a similar episode in the past and was found to have a UTI. She was treated with antibiotics and symptoms resolved. She took her to urgent care since she thought this was a UTI and they prescribed her antibiotics. Patient did not improve with antibiotics, which prompted them to come to the ED. Patient is noncompliant with blood pressure medication.  At baseline, patient occasionally requires assistance to ambulate, but refuses to use walker. She requires some assistance with ADLs. NIHSS 4, pre-mRS 2. MRI showed Left parietal acute infarct.    Patient started on ASA 81mg indefinitely and Plavix for 3 months per ALIN. Patient with a stable large hematoma on her RLQ abdomen for which chemical DVT prophylaxis was not started. Duplex of the lower extremities neg.  and pt started on atorvastatin 80mg daily (titrate to LDL <70). Cardiology consulted, -160s and started on amlodipine 5mg daily. ILR placed on 1/13/23 to monitor for arrhythmias.    Patient was evaluated by PM&R and therapy for functional deficits, gait/ADL impairments and acute rehabilitation was recommended. Patient was medically optimized for discharge to [facility] IRU on 1/17/2022.      SUBJECTIVE  Patient seen and examined. NAD.  No events overnight.   Denies any pain at this time.   Participating in therapy       VITALS  Vital Signs Last 24 Hrs  T(C): 36.3 (23 Jan 2023 09:00), Max: 37.1 (22 Jan 2023 22:00)  T(F): 97.3 (23 Jan 2023 09:00), Max: 98.7 (22 Jan 2023 22:00)  HR: 72 (23 Jan 2023 09:00) (72 - 80)  BP: 135/79 (23 Jan 2023 09:00) (125/72 - 151/72)  BP(mean): --  RR: 16 (23 Jan 2023 09:00) (16 - 16)  SpO2: 94% (23 Jan 2023 09:00) (94% - 94%)    Parameters below as of 23 Jan 2023 09:00  Patient On (Oxygen Delivery Method): room air      RECENT LABS                        15.1   6.08  )-----------( 254      ( 23 Jan 2023 06:24 )             45.8     01-23    139  |  103  |  19  ----------------------------<  110<H>  4.4   |  27  |  1.08    Ca    9.1      23 Jan 2023 06:24    TPro  7.8  /  Alb  3.3  /  TBili  0.6  /  DBili  x   /  AST  36  /  ALT  47<H>  /  AlkPhos  93  01-23      LIVER FUNCTIONS - ( 23 Jan 2023 06:24 )  Alb: 3.3 g/dL / Pro: 7.8 g/dL / ALK PHOS: 93 U/L / ALT: 47 U/L / AST: 36 U/L / GGT: x             Physical Exam:   Constitutional - NAD, Comfortable  HEENT - NCAT, EOMI  Chest - good chest expansion, good respiratory effort, CTAB + loop recorder site c/d/i.   Cardio - warm and well perfused.  Abdomen -  Soft, NTND  Extremities - No peripheral edema, No calf tenderness   Neurologic Exam:                    Cognitive -             Orientation: Awake, Alert, AAO to self, place, date, year, situation            Attention:  Able to repeat. Able to recall 1/3 objects after 5 mins. Unable to do serial 7's.             Memory: Recent  memory intact            Thought: process, content appropriate     Speech - Expressive aphasia, Comprehensible, Mild dysarthria, able to repeat.      Cranial Nerves - No facial asymmetry, Tongue midline, Shoulder shrug intact     Motor -                      LEFT    UE - ShAB 5/5, EF 5/5, EE 5/5, WE 5/5,  WNL                    RIGHT UE - ShAB 5/5, EF 5/5, EE 5/5, WE 5/5,  WNL                    LEFT    LE - HF 4/5, KE 5/5, DF 5/5, PF 5/5                    RIGHT LE - HF 4/5, KE 5/5, DF 5/5, PF 5/5        Sensory - Intact to LT bilateral  Psychiatric - Mood stable, Affect WNL  Skin: Hematoma noted on the RLQ and at left breast medial to the areola improving    CURRENT MEDICATIONS  MEDICATIONS  (STANDING):  amLODIPine   Tablet 10 milliGRAM(s) Oral daily  aspirin enteric coated 81 milliGRAM(s) Oral daily  atorvastatin 80 milliGRAM(s) Oral at bedtime  clopidogrel Tablet 75 milliGRAM(s) Oral daily  enoxaparin Injectable 40 milliGRAM(s) SubCutaneous every 24 hours  polyethylene glycol 3350 17 Gram(s) Oral daily  senna 2 Tablet(s) Oral at bedtime    MEDICATIONS  (PRN):  bisacodyl 5 milliGRAM(s) Oral every 12 hours PRN Constipation  ondansetron   Disintegrating Tablet 4 milliGRAM(s) Oral three times a day PRN Nausea and/or Vomiting      Continue comprehensive acute rehab program consisting of 3hrs/day of OT/PT and SLP.

## 2023-01-23 NOTE — PROGRESS NOTE ADULT - SUBJECTIVE AND OBJECTIVE BOX
Patient is a 77y old  Female who presents with a chief complaint of s/p CVA (23 Jan 2023 09:37)      24 HOUR EVENTS:  No overnight events reported.     SUBJECTIVE:  Patient seen and examined at bedside.     ALLERGIES:  No Known Allergies    MEDICATIONS  (STANDING):  amLODIPine   Tablet 10 milliGRAM(s) Oral daily  aspirin enteric coated 81 milliGRAM(s) Oral daily  atorvastatin 80 milliGRAM(s) Oral at bedtime  clopidogrel Tablet 75 milliGRAM(s) Oral daily  enoxaparin Injectable 40 milliGRAM(s) SubCutaneous every 24 hours  polyethylene glycol 3350 17 Gram(s) Oral daily  senna 2 Tablet(s) Oral at bedtime    MEDICATIONS  (PRN):  bisacodyl 5 milliGRAM(s) Oral every 12 hours PRN Constipation  ondansetron   Disintegrating Tablet 4 milliGRAM(s) Oral three times a day PRN Nausea and/or Vomiting    Vital Signs Last 24 Hrs  T(F): 97.3 (23 Jan 2023 09:00), Max: 98.7 (22 Jan 2023 22:00)  HR: 72 (23 Jan 2023 09:00) (72 - 80)  BP: 135/79 (23 Jan 2023 09:00) (125/72 - 151/72)  RR: 16 (23 Jan 2023 09:00) (16 - 16)  SpO2: 94% (23 Jan 2023 09:00) (94% - 94%)  I&O's Summary    PHYSICAL EXAM:  General: NAD, Awake and alert  ENT: Moist mucous membranes, no thrush  Neck: Supple, No JVD  Lungs: Clear to auscultation bilaterally, good air entry, non-labored breathing  Cardio: RRR, S1/S2, No murmur  Abdomen: Soft, Nontender, Nondistended; Bowel sounds present  Extremities: No calf tenderness, No pitting edema, no contractures.    LABS:                        15.1   6.08  )-----------( 254      ( 23 Jan 2023 06:24 )             45.8     01-23    139  |  103  |  19  ----------------------------<  110  4.4   |  27  |  1.08    Ca    9.1      23 Jan 2023 06:24    TPro  7.8  /  Alb  3.3  /  TBili  0.6  /  DBili  x   /  AST  36  /  ALT  47  /  AlkPhos  93  01-23 01-12 Chol 184 mg/dL LDL -- HDL 36 mg/dL Trig 78 mg/dL                      COVID-19 PCR: NotDetec (01-17-23 @ 22:30)  COVID-19 PCR: NotDetec (01-16-23 @ 09:35)    RADIOLOGY & ADDITIONAL TESTS:    Care Discussed with Consultants/Other Providers:

## 2023-01-24 PROCEDURE — 99232 SBSQ HOSP IP/OBS MODERATE 35: CPT

## 2023-01-24 RX ADMIN — POLYETHYLENE GLYCOL 3350 17 GRAM(S): 17 POWDER, FOR SOLUTION ORAL at 11:08

## 2023-01-24 RX ADMIN — ENOXAPARIN SODIUM 40 MILLIGRAM(S): 100 INJECTION SUBCUTANEOUS at 21:02

## 2023-01-24 RX ADMIN — ATORVASTATIN CALCIUM 80 MILLIGRAM(S): 80 TABLET, FILM COATED ORAL at 21:02

## 2023-01-24 RX ADMIN — Medication 81 MILLIGRAM(S): at 11:08

## 2023-01-24 RX ADMIN — CLOPIDOGREL BISULFATE 75 MILLIGRAM(S): 75 TABLET, FILM COATED ORAL at 11:08

## 2023-01-24 RX ADMIN — AMLODIPINE BESYLATE 10 MILLIGRAM(S): 2.5 TABLET ORAL at 05:31

## 2023-01-24 NOTE — PROGRESS NOTE ADULT - ASSESSMENT
ASSESSMENT/PLAN  This is a 78 YO  woman with PMH  of HTN who presented to [place]  on 1/11 with expressive aphasia since 1/8. MRI showed Left parietal acute infarct. She was also found to have stable large hematoma on her RLQ abdomen. Patient now with gait Instability, ADL impairments and Functional impairments.    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  - aphasia  - dysarthia  - dysphagia  - Continue Comprehensive Rehab Program: PT/OT/ST, 3hours daily and 5 days weekly  - PT: Focused on improving strength, endurance, coordination, balance, functional mobility, and transfers  - OT: Focused on improving strength, fine motor skills, coordination, posture and ADLs.    - ST: to diagnose and treat deficits in swallowing, cognition and communication.   - c/w aspirin, plavix and high dose statin  - ILR placed on 1/13/23     #HTN  - Amlodipine increased to 10mg daily.   - BP is stable.     #HLD  - Lipitor 80mg daily    # CKD  - monitor kidney function, BMP, electrolytes     #Hematoma  - stable hematoma on her RLQ abdomen and the Left Breast.   - Continue to monitor    #Pain management  - Tylenol PRN    #DVT ppx  - Lovenox   - last dopplers: Negative for DVT on 1/15    #Bowel Regimen  - Senna, miralax     #Bladder management  - BS on admission, and q 8 hours (SC if > 400)  - Monitor UO    #FEN   - Diet: DASH     #Skin:  - Skin on admission: Large hematoma on her RLQ abdomen and the Left Breast.   - Pressure injury/Skin: Turn Q2hrs while in bed, OOB to Chair, PT/OT     #Dysphagia    - SLP: evaluation and treatment    #Sleep:   - Maintain quiet hours and low stim environment.  - Melatonin PRN to maximize participation in therapy during the day.     #Precaution  - Fall, Aspiration    #GOC  CODE STATUS: FULL CODE       IDTR     # Case discussed in IDT rounds 1/19  - She lives in apartment with 2 steps and has support from 2 daughter.   - She is setup for grooming and eating. CG for UBD and LBD. Min assist for toileting. Goal   - She is walking 70 feet with RW with min assist. she is able to do 8 steps with CG / Min [email or web address]   - Regular + thin. Expressive aphasia. Mild dysarthria. Pending cognitive evaluation.   - goals: Ambulate to the bathroom with supervision and supervision with transfer.   - target dc home 1/31.   - caregiver training

## 2023-01-24 NOTE — PROGRESS NOTE ADULT - ASSESSMENT
77 year old PMH HTN presented to [facility] on 1/11 after having expressive aphasia since 1/8,  found to have acute Left parietal infarct with L distal M2 occlusion. 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

## 2023-01-24 NOTE — PROGRESS NOTE ADULT - SUBJECTIVE AND OBJECTIVE BOX
HPI  This is a 77-year-old right-handed female patient with PMH  of HTN who presented to [place]  on 1/11 with expressive aphasia since 1/8. Daughter found the patient in her bedroom. She was trying to tell her daughter that she had trouble speaking. She knew what she wanted to say, but couldn't find the words to express herself. The daughter reports she had a similar episode in the past and was found to have a UTI. She was treated with antibiotics and symptoms resolved. She took her to urgent care since she thought this was a UTI and they prescribed her antibiotics. Patient did not improve with antibiotics, which prompted them to come to the ED. Patient is noncompliant with blood pressure medication.  At baseline, patient occasionally requires assistance to ambulate, but refuses to use walker. She requires some assistance with ADLs. NIHSS 4, pre-mRS 2. MRI showed Left parietal acute infarct.    Patient started on ASA 81mg indefinitely and Plavix for 3 months per ALIN. Patient with a stable large hematoma on her RLQ abdomen for which chemical DVT prophylaxis was not started. Duplex of the lower extremities neg.  and pt started on atorvastatin 80mg daily (titrate to LDL <70). Cardiology consulted, -160s and started on amlodipine 5mg daily. ILR placed on 1/13/23 to monitor for arrhythmias.    Patient was evaluated by PM&R and therapy for functional deficits, gait/ADL impairments and acute rehabilitation was recommended. Patient was medically optimized for discharge to [facility] IRU on 1/17/2022.    TDD - 1/31 Home  ___________________________________________________________________________________________    SUBJECTIVE  Patient seen and examined at bedside  No events overnight.   Denies any pain at this time.   Denies any-other complaints at this time.   Participating in therapy     ___________________________________________________________________________________________    T(C): 36.2 (01-24-23 @ 07:27)  T(F): 97.2 (01-24-23 @ 07:27), Max: 98.5 (01-23-23 @ 19:34)  HR: 71 (01-24-23 @ 07:27) (71 - 76)  BP: 164/82 (01-24-23 @ 07:27) (116/64 - 164/82)  RR:  (16 - 17)  SpO2:  (94% - 95%)  Wt(kg): --  ___________________________________________________________________________________________    MEDICATIONS  (STANDING):  amLODIPine   Tablet 10 milliGRAM(s) Oral daily  aspirin enteric coated 81 milliGRAM(s) Oral daily  atorvastatin 80 milliGRAM(s) Oral at bedtime  clopidogrel Tablet 75 milliGRAM(s) Oral daily  enoxaparin Injectable 40 milliGRAM(s) SubCutaneous every 24 hours  polyethylene glycol 3350 17 Gram(s) Oral daily  senna 2 Tablet(s) Oral at bedtime    MEDICATIONS  (PRN):  acetaminophen     Tablet .. 650 milliGRAM(s) Oral every 6 hours PRN Temp greater or equal to 38C (100.4F), Mild Pain (1 - 3)  bisacodyl 5 milliGRAM(s) Oral every 12 hours PRN Constipation  ondansetron   Disintegrating Tablet 4 milliGRAM(s) Oral three times a day PRN Nausea and/or Vomiting      ___________________________________________________________________________________________    Review of Systems:   Constitutional: No fever, No Chills, No fatigue  HEENT: No eye pain, No visual disturbances, No difficulty hearing  Pulm: No cough,  No shortness of breath  Cardio: No chest pain, No palpitations  GI:  No abdominal pain, No nausea, No vomiting, No diarrhea, No constipation  : No dysuria, No frequency, No hematuria  Neuro: No headaches, No memory loss, No loss of strength, No numbness, No tremors  Skin: No itching, No rashes, No lesions   MSK: No joint pain, No joint swelling, No muscle pain, No Neck or back pain    ___________________________________________________________________________________________    Physical Exam:   Constitutional - NAD, Comfortable  HEENT - NCAT, EOMI  Chest - good chest expansion, good respiratory effort, CTAB + loop recorder site c/d/i.   Cardio - warm and well perfused.  Abdomen -  Soft, NTND  Extremities - No peripheral edema, No calf tenderness   Neurologic Exam:                    Cognitive -             Orientation: Awake, Alert, AAO to self, place, date, year, situation            Attention:  Able to repeat. Able to recall 1/3 objects after 5 mins. Unable to do serial 7's.             Memory: Recent  memory intact            Thought: process, content appropriate     Speech - Expressive aphasia, Comprehensible, Mild dysarthria, able to repeat.      Cranial Nerves - No facial asymmetry, Tongue midline, Shoulder shrug intact     Motor -                      LEFT    UE - ShAB 5/5, EF 5/5, EE 5/5, WE 5/5,  WNL                    RIGHT UE - ShAB 5/5, EF 5/5, EE 5/5, WE 5/5,  WNL                    LEFT    LE - HF 4/5, KE 5/5, DF 5/5, PF 5/5                    RIGHT LE - HF 4/5, KE 5/5, DF 5/5, PF 5/5        Sensory - Intact to LT bilateral  Psychiatric - Mood stable, Affect WNL  Skin: Hematoma noted on the RLQ and at left breast medial to the areola improving HPI  This is a 77-year-old right-handed female patient with PMH  of HTN who presented to [place]  on 1/11 with expressive aphasia since 1/8. Daughter found the patient in her bedroom. She was trying to tell her daughter that she had trouble speaking. She knew what she wanted to say, but couldn't find the words to express herself. The daughter reports she had a similar episode in the past and was found to have a UTI. She was treated with antibiotics and symptoms resolved. She took her to urgent care since she thought this was a UTI and they prescribed her antibiotics. Patient did not improve with antibiotics, which prompted them to come to the ED. Patient is noncompliant with blood pressure medication.  At baseline, patient occasionally requires assistance to ambulate, but refuses to use walker. She requires some assistance with ADLs. NIHSS 4, pre-mRS 2. MRI showed Left parietal acute infarct.    Patient started on ASA 81mg indefinitely and Plavix for 3 months per ALIN. Patient with a stable large hematoma on her RLQ abdomen for which chemical DVT prophylaxis was not started. Duplex of the lower extremities neg.  and pt started on atorvastatin 80mg daily (titrate to LDL <70). Cardiology consulted, -160s and started on amlodipine 5mg daily. ILR placed on 1/13/23 to monitor for arrhythmias.    Patient was evaluated by PM&R and therapy for functional deficits, gait/ADL impairments and acute rehabilitation was recommended. Patient was medically optimized for discharge to [facility] IRU on 1/17/2022.    TDD - 1/31 Home  ___________________________________________________________________________________________    SUBJECTIVE  Patient seen and examined at bedside  No events overnight.   Denies any pain at this time.   Denies any-other complaints at this time.   Participating in therapy     ___________________________________________________________________________________________    T(C): 36.2 (01-24-23 @ 07:27)  T(F): 97.2 (01-24-23 @ 07:27), Max: 98.5 (01-23-23 @ 19:34)  HR: 71 (01-24-23 @ 07:27) (71 - 76)  BP: 164/82 (01-24-23 @ 07:27) (116/64 - 164/82)  RR:  (16 - 17)  SpO2:  (94% - 95%)  ___________________________________________________________________________________________    MEDICATIONS  (STANDING):  amLODIPine   Tablet 10 milliGRAM(s) Oral daily  aspirin enteric coated 81 milliGRAM(s) Oral daily  atorvastatin 80 milliGRAM(s) Oral at bedtime  clopidogrel Tablet 75 milliGRAM(s) Oral daily  enoxaparin Injectable 40 milliGRAM(s) SubCutaneous every 24 hours  polyethylene glycol 3350 17 Gram(s) Oral daily  senna 2 Tablet(s) Oral at bedtime    MEDICATIONS  (PRN):  acetaminophen     Tablet .. 650 milliGRAM(s) Oral every 6 hours PRN Temp greater or equal to 38C (100.4F), Mild Pain (1 - 3)  bisacodyl 5 milliGRAM(s) Oral every 12 hours PRN Constipation  ondansetron   Disintegrating Tablet 4 milliGRAM(s) Oral three times a day PRN Nausea and/or Vomiting      ___________________________________________________________________________________________    Review of Systems:   Constitutional: No fever, No Chills, No fatigue  HEENT: No eye pain, No visual disturbances, No difficulty hearing  Pulm: No cough,  No shortness of breath  Cardio: No chest pain, No palpitations  GI:  No abdominal pain, No nausea, No vomiting, No diarrhea, No constipation  : No dysuria, No frequency, No hematuria  Neuro: No headaches, No memory loss, No loss of strength, No numbness, No tremors  Skin: No itching, No rashes, No lesions   MSK: No joint pain, No joint swelling, No muscle pain, No Neck or back pain    ___________________________________________________________________________________________    Physical Exam:   Constitutional - NAD, Comfortable  HEENT - NCAT, EOMI  Chest - good chest expansion, good respiratory effort, CTAB + loop recorder site c/d/i.   Cardio - warm and well perfused.  Abdomen -  Soft, NTND  Extremities - No peripheral edema, No calf tenderness   Neurologic Exam:                    Cognitive -             Orientation: Awake, Alert, AAO to self, place, date, year, situation            Attention:  Able to repeat. Able to recall 1/3 objects after 5 mins. Unable to do serial 7's.             Memory: Recent  memory intact            Thought: process, content appropriate     Speech - Expressive aphasia, Comprehensible, Mild dysarthria, able to repeat.      Cranial Nerves - No facial asymmetry, Tongue midline, Shoulder shrug intact     Motor -                      LEFT    UE - ShAB 5/5, EF 5/5, EE 5/5, WE 5/5,  WNL                    RIGHT UE - ShAB 5/5, EF 5/5, EE 5/5, WE 5/5,  WNL                    LEFT    LE - HF 4/5, KE 5/5, DF 5/5, PF 5/5                    RIGHT LE - HF 4/5, KE 5/5, DF 5/5, PF 5/5        Sensory - Intact to LT bilateral  Psychiatric - Mood stable, Affect WNL  Skin: Hematoma noted on the RLQ and at left breast medial to the areola improving

## 2023-01-24 NOTE — PROGRESS NOTE ADULT - SUBJECTIVE AND OBJECTIVE BOX
Patient is a 77y old  Female who presents with a chief complaint of CVA     Patient seen and examined at bedside. no new complaints, eating breakfast    Vital Signs Last 24 Hrs  T(C): 36.2 (2023 07:27), Max: 36.9 (2023 19:34)  T(F): 97.2 (2023 07:27), Max: 98.5 (2023 19:34)  HR: 71 (2023 07:27) (71 - 76)  BP: 164/82 (2023 07:27) (116/64 - 164/82)  BP(mean): --  RR: 17 (2023 07:27) (16 - 17)  SpO2: 95% (2023 07:27) (94% - 95%)    Parameters below as of 2023 07:27  Patient On (Oxygen Delivery Method): room air        GENERAL- NAD  EAR/NOSE/MOUTH/THROAT - no pharyngeal exudates, no oral leisions,  MMM  EYES- ERI, conjunctiva and Sclera clear  NECK- supple  RESPIRATORY-  clear to auscultation bilaterally, non laboured breathing  CARDIOVASCULAR - SIS2, RRR  GI - soft NT BS present  EXTREMITIES- no pedal edema  NEUROLOGY- no gross focal deficits  PSYCHIATRY- AAO X 2                  15.1                 139  | 27   | 19           6.08  >-----------< 254     ------------------------< 110                   45.8                 4.4  | 103  | 1.08                                         Ca 9.1   Mg x     Ph x      Urinalysis Basic - ( 2023 17:15 )    Color: Yellow / Appearance: Clear / S.015 / pH: x  Gluc: x / Ketone: Negative  / Bili: Negative / Urobili: Negative   Blood: x / Protein: Negative / Nitrite: Negative   Leuk Esterase: Small / RBC: Negative /HPF / WBC 3-5 /HPF   Sq Epi: x / Non Sq Epi: Neg.-Few / Bacteria: Negative /HPF      MEDICATIONS  (STANDING):  amLODIPine   Tablet 10 milliGRAM(s) Oral daily  aspirin enteric coated 81 milliGRAM(s) Oral daily  atorvastatin 80 milliGRAM(s) Oral at bedtime  clopidogrel Tablet 75 milliGRAM(s) Oral daily  enoxaparin Injectable 40 milliGRAM(s) SubCutaneous every 24 hours  polyethylene glycol 3350 17 Gram(s) Oral daily  senna 2 Tablet(s) Oral at bedtime    MEDICATIONS  (PRN):  acetaminophen     Tablet .. 650 milliGRAM(s) Oral every 6 hours PRN Temp greater or equal to 38C (100.4F), Mild Pain (1 - 3)  bisacodyl 5 milliGRAM(s) Oral every 12 hours PRN Constipation  ondansetron   Disintegrating Tablet 4 milliGRAM(s) Oral three times a day PRN Nausea and/or Vomiting

## 2023-01-25 PROCEDURE — 99232 SBSQ HOSP IP/OBS MODERATE 35: CPT

## 2023-01-25 RX ADMIN — CLOPIDOGREL BISULFATE 75 MILLIGRAM(S): 75 TABLET, FILM COATED ORAL at 11:25

## 2023-01-25 RX ADMIN — Medication 81 MILLIGRAM(S): at 11:25

## 2023-01-25 RX ADMIN — AMLODIPINE BESYLATE 10 MILLIGRAM(S): 2.5 TABLET ORAL at 05:15

## 2023-01-25 RX ADMIN — ATORVASTATIN CALCIUM 80 MILLIGRAM(S): 80 TABLET, FILM COATED ORAL at 21:24

## 2023-01-25 RX ADMIN — ENOXAPARIN SODIUM 40 MILLIGRAM(S): 100 INJECTION SUBCUTANEOUS at 21:23

## 2023-01-25 NOTE — PROGRESS NOTE ADULT - ASSESSMENT
ASSESSMENT/PLAN  This is a 76 YO  woman with PMH  of HTN who presented to [place]  on 1/11 with expressive aphasia since 1/8. MRI showed Left parietal acute infarct. She was also found to have stable large hematoma on her RLQ abdomen. Patient now with gait Instability, ADL impairments and Functional impairments.    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  - aphasia  - dysarthia  - dysphagia  - Continue Comprehensive Rehab Program: PT/OT/ST, 3hours daily and 5 days weekly  - PT: Focused on improving strength, endurance, coordination, balance, functional mobility, and transfers  - OT: Focused on improving strength, fine motor skills, coordination, posture and ADLs.    - ST: to diagnose and treat deficits in swallowing, cognition and communication.   - c/w aspirin, plavix and high dose statin  - ILR placed on 1/13/23     #HTN  - Amlodipine increased to 10mg daily.   - BP is stable.     #HLD  - Lipitor 80mg daily    # CKD  - monitor kidney function, BMP, electrolytes     #Hematoma  - stable hematoma on her RLQ abdomen and the Left Breast.   - Continue to monitor    #Pain management  - Tylenol PRN    #DVT ppx  - Lovenox   - last dopplers: Negative for DVT on 1/15    #Bowel Regimen  - Senna, miralax     #Bladder management  - BS on admission, and q 8 hours (SC if > 400)  - Monitor UO    #FEN   - Diet: DASH     #Skin:  - Skin on admission: Large hematoma on her RLQ abdomen and the Left Breast.   - Pressure injury/Skin: Turn Q2hrs while in bed, OOB to Chair, PT/OT     #Dysphagia    - SLP: evaluation and treatment    #Sleep:   - Maintain quiet hours and low stim environment.  - Melatonin PRN to maximize participation in therapy during the day.     #Precaution  - Fall, Aspiration    #GOC  CODE STATUS: FULL CODE       IDTR     # Case discussed in IDT rounds 1/19  - She lives in apartment with 2 steps and has support from 2 daughter.   - She is setup for grooming and eating. CG for UBD and LBD. Min assist for toileting. Goal   - She is walking 70 feet with RW with min assist. she is able to do 8 steps with CG / Min [email or web address]   - Regular + thin. Expressive aphasia. Mild dysarthria. Pending cognitive evaluation.   - goals: Ambulate to the bathroom with supervision and supervision with transfer.   - target dc home 1/31.   - caregiver training

## 2023-01-25 NOTE — PROGRESS NOTE ADULT - SUBJECTIVE AND OBJECTIVE BOX
HPI  This is a 77-year-old right-handed female patient with PMH  of HTN who presented to [place]  on 1/11 with expressive aphasia since 1/8. Daughter found the patient in her bedroom. She was trying to tell her daughter that she had trouble speaking. She knew what she wanted to say, but couldn't find the words to express herself. The daughter reports she had a similar episode in the past and was found to have a UTI. She was treated with antibiotics and symptoms resolved. She took her to urgent care since she thought this was a UTI and they prescribed her antibiotics. Patient did not improve with antibiotics, which prompted them to come to the ED. Patient is noncompliant with blood pressure medication.  At baseline, patient occasionally requires assistance to ambulate, but refuses to use walker. She requires some assistance with ADLs. NIHSS 4, pre-mRS 2. MRI showed Left parietal acute infarct.    Patient started on ASA 81mg indefinitely and Plavix for 3 months per ALIN. Patient with a stable large hematoma on her RLQ abdomen for which chemical DVT prophylaxis was not started. Duplex of the lower extremities neg.  and pt started on atorvastatin 80mg daily (titrate to LDL <70). Cardiology consulted, -160s and started on amlodipine 5mg daily. ILR placed on 1/13/23 to monitor for arrhythmias.    Patient was evaluated by PM&R and therapy for functional deficits, gait/ADL impairments and acute rehabilitation was recommended. Patient was medically optimized for discharge to [facility] IRU on 1/17/2022.    TDD - 1/31 Home  ___________________________________________________________________________________________    SUBJECTIVE  Patient seen and examined at bedside while on WC  No events overnight.   Denies CP, SOB, palpitations, PONCE, N/V, Cough  Denies any pain at this time.   Smo discomfort on left shoulder  Denies any-other complaints at this time.   Participating in therapy     ___________________________________________________________________________________________    Vital Signs Last 24 Hrs  T(C): 36.7 (25 Jan 2023 08:32), Max: 36.7 (25 Jan 2023 08:32)  T(F): 98.1 (25 Jan 2023 08:32), Max: 98.1 (25 Jan 2023 08:32)  HR: 82 (25 Jan 2023 08:32) (75 - 82)  BP: 127/71 (25 Jan 2023 08:32) (127/71 - 155/88)  RR: 15 (25 Jan 2023 08:32) (15 - 16)  SpO2: 94% (25 Jan 2023 08:32) (94% - 96%)    ___________________________________________________________________________________________    MEDICATIONS  (STANDING):  amLODIPine   Tablet 10 milliGRAM(s) Oral daily  aspirin enteric coated 81 milliGRAM(s) Oral daily  atorvastatin 80 milliGRAM(s) Oral at bedtime  clopidogrel Tablet 75 milliGRAM(s) Oral daily  enoxaparin Injectable 40 milliGRAM(s) SubCutaneous every 24 hours  polyethylene glycol 3350 17 Gram(s) Oral daily  senna 2 Tablet(s) Oral at bedtime    MEDICATIONS  (PRN):  acetaminophen     Tablet .. 650 milliGRAM(s) Oral every 6 hours PRN Temp greater or equal to 38C (100.4F), Mild Pain (1 - 3)  bisacodyl 5 milliGRAM(s) Oral every 12 hours PRN Constipation  ondansetron   Disintegrating Tablet 4 milliGRAM(s) Oral three times a day PRN Nausea and/or Vomiting    ___________________________________________________________________________________________    Physical Exam:   Constitutional - NAD, Comfortable  HEENT - NCAT, EOMI  Chest - good chest expansion, good respiratory effort, CTAB + loop recorder site c/d/i.   Cardio - warm and well perfused.  Abdomen -  Soft, NTND  Extremities - No peripheral edema, No calf tenderness   Neurologic Exam:                    Cognitive -             Orientation: Awake, Alert, AAO to self, place, date, year, situation            Attention:  Able to repeat. Able to recall 1/3 objects after 5 mins. Unable to do serial 7's.             Memory: Recent  memory intact            Thought: process, content appropriate     Speech - Expressive aphasia, Comprehensible, Mild dysarthria, able to repeat.      Cranial Nerves - No facial asymmetry, Tongue midline, Shoulder shrug intact     Motor -                      LEFT    UE - ShAB 5/5, EF 5/5, EE 5/5, WE 5/5,  WNL                    RIGHT UE - ShAB 5/5, EF 5/5, EE 5/5, WE 5/5,  WNL                    LEFT    LE - HF 4/5, KE 5/5, DF 5/5, PF 5/5                    RIGHT LE - HF 4/5, KE 5/5, DF 5/5, PF 5/5        Sensory - Intact to LT bilateral  Psychiatric - Mood stable, Affect WNL  Skin: Hematoma noted on the RLQ and at left breast medial to the areola improving

## 2023-01-25 NOTE — CHART NOTE - NSCHARTNOTEFT_GEN_A_CORE
Nutrition Follow Up Note  Hospital Course   (Per Electronic Medical Record)    Source:  Patient [X]  Medical Record [X]      Diet:   DASH-TLC Diet w/ Thin Liquids (IDDSI Level 0)  Tolerates Diet Consistency Well  No Chewing/Swallowing Difficulties  No Recent Nausea, Vomiting, Diarrhea or Constipation (as Per Patient)  Consumes % of Meals (as Per Documentation) - States Fair PO Intake/Appetite (Per Patient)  Education Provided on Proper Nutrition   Obtained Food Preferences from Patient    Enteral/Parenteral Nutrition: Not Applicable    Current Weight: 144.4lb on 1/23  Obtain New Weight to Confirm  Obtain Weights Weekly     Pertinent Medications: MEDICATIONS  (STANDING):  amLODIPine   Tablet 10 milliGRAM(s) Oral daily  aspirin enteric coated 81 milliGRAM(s) Oral daily  atorvastatin 80 milliGRAM(s) Oral at bedtime  clopidogrel Tablet 75 milliGRAM(s) Oral daily  enoxaparin Injectable 40 milliGRAM(s) SubCutaneous every 24 hours  polyethylene glycol 3350 17 Gram(s) Oral daily  senna 2 Tablet(s) Oral at bedtime    MEDICATIONS  (PRN):  acetaminophen     Tablet .. 650 milliGRAM(s) Oral every 6 hours PRN Temp greater or equal to 38C (100.4F), Mild Pain (1 - 3)  bisacodyl 5 milliGRAM(s) Oral every 12 hours PRN Constipation  ondansetron   Disintegrating Tablet 4 milliGRAM(s) Oral three times a day PRN Nausea and/or Vomiting    Pertinent Labs:  01-23 Na139 mmol/L Glu 110 mg/dL<H> K+ 4.4 mmol/L Cr  1.08 mg/dL BUN 19 mg/dL 01-23 Alb 3.3 g/dL 01-12 Chol 184 mg/dL LDL --    HDL 36 mg/dL<L> Trig 78 mg/dL    Skin: No Pressure Ulcers  Surgical Incision on Left Chest Wall  (as Per Nursing Flow Sheet)     Edema: None Noted (as Per Documentation) v    Last Bowel Movement: on 12/    Estimated Needs:   [X] No Change Since Previous Assessment    Previous Nutrition Diagnosis:   No Active Nutrition Dx @ This Present Time      Nutrition Diagnosis is[X] Not Applicable     New Nutrition Diagnosis: [X] Not Applicable    Interventions:   1. Education Provided on Proper Nutrition   2. Recommend Continue Nutrition Plan of Care     Monitoring & Evaluation:   [X] Weights   [X] PO Intake   [X] Skin Integrity   [X] Follow Up (Per Protocol)  [X] Tolerance to Diet Prescription   [X] Other: Labs     Registered Dietitian/Nutritionist Remains Available.  Mac Collins, AISHWARYAN    Phone# [phone]

## 2023-01-25 NOTE — PROGRESS NOTE ADULT - ASSESSMENT
77 year old PMH HTN presented to [facility] on 1/11 after having expressive aphasia since 1/8,  found to have acute Left parietal infarct with L distal M2 occlusion. 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

## 2023-01-25 NOTE — PROGRESS NOTE ADULT - SUBJECTIVE AND OBJECTIVE BOX
Patient is a 77y old  Female who presents with a chief complaint of CVA     Patient seen and examined at bedside. no new complaints, eating breakfast    Vital Signs Last 24 Hrs  T(C): 36.7 (2023 08:32), Max: 36.7 (2023 08:32)  T(F): 98.1 (2023 08:32), Max: 98.1 (2023 08:32)  HR: 82 (2023 08:32) (75 - 82)  BP: 127/71 (2023 08:32) (127/71 - 155/88)  BP(mean): --  RR: 15 (2023 08:32) (15 - 16)  SpO2: 94% (2023 08:32) (94% - 96%)    Parameters below as of 2023 08:32  Patient On (Oxygen Delivery Method): room air      GENERAL- NAD  EAR/NOSE/MOUTH/THROAT - no pharyngeal exudates, no oral leisions,  MMM  EYES- ERI, conjunctiva and Sclera clear  NECK- supple  RESPIRATORY-  clear to auscultation bilaterally, non laboured breathing  CARDIOVASCULAR - SIS2, RRR  GI - soft NT BS present  EXTREMITIES- no pedal edema  NEUROLOGY- no gross focal deficits  PSYCHIATRY- AAO X 2         Urinalysis Basic - ( 2023 17:15 )    Color: Yellow / Appearance: Clear / S.015 / pH: x  Gluc: x / Ketone: Negative  / Bili: Negative / Urobili: Negative   Blood: x / Protein: Negative / Nitrite: Negative   Leuk Esterase: Small / RBC: Negative /HPF / WBC 3-5 /HPF   Sq Epi: x / Non Sq Epi: Neg.-Few / Bacteria: Negative /HPF          MEDICATIONS  (STANDING):  amLODIPine   Tablet 10 milliGRAM(s) Oral daily  aspirin enteric coated 81 milliGRAM(s) Oral daily  atorvastatin 80 milliGRAM(s) Oral at bedtime  clopidogrel Tablet 75 milliGRAM(s) Oral daily  enoxaparin Injectable 40 milliGRAM(s) SubCutaneous every 24 hours  polyethylene glycol 3350 17 Gram(s) Oral daily  senna 2 Tablet(s) Oral at bedtime    MEDICATIONS  (PRN):  acetaminophen     Tablet .. 650 milliGRAM(s) Oral every 6 hours PRN Temp greater or equal to 38C (100.4F), Mild Pain (1 - 3)  bisacodyl 5 milliGRAM(s) Oral every 12 hours PRN Constipation  ondansetron   Disintegrating Tablet 4 milliGRAM(s) Oral three times a day PRN Nausea and/or Vomiting

## 2023-01-26 LAB
ALBUMIN SERPL ELPH-MCNC: 3.4 G/DL — SIGNIFICANT CHANGE UP (ref 3.3–5)
ALP SERPL-CCNC: 92 U/L — SIGNIFICANT CHANGE UP (ref 40–120)
ALT FLD-CCNC: 62 U/L — HIGH (ref 10–45)
ANION GAP SERPL CALC-SCNC: 12 MMOL/L — SIGNIFICANT CHANGE UP (ref 5–17)
AST SERPL-CCNC: 33 U/L — SIGNIFICANT CHANGE UP (ref 10–40)
BILIRUB SERPL-MCNC: 0.5 MG/DL — SIGNIFICANT CHANGE UP (ref 0.2–1.2)
BUN SERPL-MCNC: 18 MG/DL — SIGNIFICANT CHANGE UP (ref 7–23)
CALCIUM SERPL-MCNC: 9.1 MG/DL — SIGNIFICANT CHANGE UP (ref 8.4–10.5)
CHLORIDE SERPL-SCNC: 102 MMOL/L — SIGNIFICANT CHANGE UP (ref 96–108)
CO2 SERPL-SCNC: 25 MMOL/L — SIGNIFICANT CHANGE UP (ref 22–31)
CREAT SERPL-MCNC: 1.24 MG/DL — SIGNIFICANT CHANGE UP (ref 0.5–1.3)
EGFR: 45 ML/MIN/1.73M2 — LOW
GLUCOSE SERPL-MCNC: 113 MG/DL — HIGH (ref 70–99)
HCT VFR BLD CALC: 40.1 % — SIGNIFICANT CHANGE UP (ref 34.5–45)
HGB BLD-MCNC: 13.3 G/DL — SIGNIFICANT CHANGE UP (ref 11.5–15.5)
MCHC RBC-ENTMCNC: 29.6 PG — SIGNIFICANT CHANGE UP (ref 27–34)
MCHC RBC-ENTMCNC: 33.2 GM/DL — SIGNIFICANT CHANGE UP (ref 32–36)
MCV RBC AUTO: 89.1 FL — SIGNIFICANT CHANGE UP (ref 80–100)
NRBC # BLD: 0 /100 WBCS — SIGNIFICANT CHANGE UP (ref 0–0)
PLATELET # BLD AUTO: 297 K/UL — SIGNIFICANT CHANGE UP (ref 150–400)
POTASSIUM SERPL-MCNC: 4 MMOL/L — SIGNIFICANT CHANGE UP (ref 3.5–5.3)
POTASSIUM SERPL-SCNC: 4 MMOL/L — SIGNIFICANT CHANGE UP (ref 3.5–5.3)
PROT SERPL-MCNC: 7.6 G/DL — SIGNIFICANT CHANGE UP (ref 6–8.3)
RBC # BLD: 4.5 M/UL — SIGNIFICANT CHANGE UP (ref 3.8–5.2)
RBC # FLD: 12 % — SIGNIFICANT CHANGE UP (ref 10.3–14.5)
SODIUM SERPL-SCNC: 139 MMOL/L — SIGNIFICANT CHANGE UP (ref 135–145)
WBC # BLD: 4.31 K/UL — SIGNIFICANT CHANGE UP (ref 3.8–10.5)
WBC # FLD AUTO: 4.31 K/UL — SIGNIFICANT CHANGE UP (ref 3.8–10.5)

## 2023-01-26 PROCEDURE — 99232 SBSQ HOSP IP/OBS MODERATE 35: CPT

## 2023-01-26 RX ADMIN — Medication 81 MILLIGRAM(S): at 11:06

## 2023-01-26 RX ADMIN — ATORVASTATIN CALCIUM 80 MILLIGRAM(S): 80 TABLET, FILM COATED ORAL at 21:35

## 2023-01-26 RX ADMIN — ENOXAPARIN SODIUM 40 MILLIGRAM(S): 100 INJECTION SUBCUTANEOUS at 21:35

## 2023-01-26 RX ADMIN — AMLODIPINE BESYLATE 10 MILLIGRAM(S): 2.5 TABLET ORAL at 05:14

## 2023-01-26 RX ADMIN — CLOPIDOGREL BISULFATE 75 MILLIGRAM(S): 75 TABLET, FILM COATED ORAL at 11:06

## 2023-01-26 NOTE — PROGRESS NOTE ADULT - SUBJECTIVE AND OBJECTIVE BOX
Patient is a 77y old  Female who presents with a chief complaint of CVA     Patient seen and examined at bedside. no new complaints, eating breakfast    Vital Signs Last 24 Hrs  T(C): 36.7 (26 Jan 2023 08:13), Max: 36.7 (25 Jan 2023 21:13)  T(F): 98 (26 Jan 2023 08:13), Max: 98.1 (25 Jan 2023 21:13)  HR: 65 (26 Jan 2023 08:13) (65 - 74)  BP: 156/81 (26 Jan 2023 08:13) (124/65 - 156/81)  BP(mean): --  RR: 15 (26 Jan 2023 08:13) (15 - 15)  SpO2: 95% (26 Jan 2023 08:13) (94% - 97%)    Parameters below as of 26 Jan 2023 08:13  Patient On (Oxygen Delivery Method): room air      GENERAL- NAD  EAR/NOSE/MOUTH/THROAT - no pharyngeal exudates, no oral leisions,  MMM  EYES- ERI, conjunctiva and Sclera clear  NECK- supple  RESPIRATORY-  clear to auscultation bilaterally, non laboured breathing  CARDIOVASCULAR - SIS2, RRR  GI - soft NT BS present  EXTREMITIES- no pedal edema  NEUROLOGY- no gross focal deficits  PSYCHIATRY- AAO X 2                     13.3                 139  | 25   | 18           4.31  >-----------< 297     ------------------------< 113                   40.1                 4.0  | 102  | 1.24                                         Ca 9.1   Mg x     Ph x            MEDICATIONS  (STANDING):  amLODIPine   Tablet 10 milliGRAM(s) Oral daily  aspirin enteric coated 81 milliGRAM(s) Oral daily  atorvastatin 80 milliGRAM(s) Oral at bedtime  clopidogrel Tablet 75 milliGRAM(s) Oral daily  enoxaparin Injectable 40 milliGRAM(s) SubCutaneous every 24 hours  polyethylene glycol 3350 17 Gram(s) Oral daily  senna 2 Tablet(s) Oral at bedtime    MEDICATIONS  (PRN):  acetaminophen     Tablet .. 650 milliGRAM(s) Oral every 6 hours PRN Temp greater or equal to 38C (100.4F), Mild Pain (1 - 3)  bisacodyl 5 milliGRAM(s) Oral every 12 hours PRN Constipation  ondansetron   Disintegrating Tablet 4 milliGRAM(s) Oral three times a day PRN Nausea and/or Vomiting

## 2023-01-26 NOTE — PROGRESS NOTE ADULT - SUBJECTIVE AND OBJECTIVE BOX
HPI  This is a 77-year-old right-handed female patient with PMH  of HTN who presented to [place]  on 1/11 with expressive aphasia since 1/8. Daughter found the patient in her bedroom. She was trying to tell her daughter that she had trouble speaking. She knew what she wanted to say, but couldn't find the words to express herself. The daughter reports she had a similar episode in the past and was found to have a UTI. She was treated with antibiotics and symptoms resolved. She took her to urgent care since she thought this was a UTI and they prescribed her antibiotics. Patient did not improve with antibiotics, which prompted them to come to the ED. Patient is noncompliant with blood pressure medication.  At baseline, patient occasionally requires assistance to ambulate, but refuses to use walker. She requires some assistance with ADLs. NIHSS 4, pre-mRS 2. MRI showed Left parietal acute infarct.    Patient started on ASA 81mg indefinitely and Plavix for 3 months per ALIN. Patient with a stable large hematoma on her RLQ abdomen for which chemical DVT prophylaxis was not started. Duplex of the lower extremities neg.  and pt started on atorvastatin 80mg daily (titrate to LDL <70). Cardiology consulted, -160s and started on amlodipine 5mg daily. ILR placed on 1/13/23 to monitor for arrhythmias.    Patient was evaluated by PM&R and therapy for functional deficits, gait/ADL impairments and acute rehabilitation was recommended. Patient was medically optimized for discharge to [facility] IRU on 1/17/2022.    TDD - 1/31 Home  ___________________________________________________________________________________________    SUBJECTIVE  Patient seen and examined at bedside  No events overnight.   Denies any pain at this time.   Smo discomfort on left shoulder which is better this am. Doesn't endorse any pain at this time.   Denies any-other complaints at this time.   Participating in therapy   Blood pressure in better controlled     ___________________________________________________________________________________________    T(C): 36.7 (01-26-23 @ 08:13)  T(F): 98 (01-26-23 @ 08:13), Max: 98.1 (01-25-23 @ 21:13)  HR: 65 (01-26-23 @ 08:13) (65 - 74)  BP: 156/81 (01-26-23 @ 08:13) (124/65 - 156/81)  RR:  (15 - 15)  SpO2:  (94% - 97%)  Wt(kg): --    ___________________________________________________________________________________________    MEDICATIONS  (STANDING):  MEDICATIONS  (STANDING):  amLODIPine   Tablet 10 milliGRAM(s) Oral daily  aspirin enteric coated 81 milliGRAM(s) Oral daily  atorvastatin 80 milliGRAM(s) Oral at bedtime  clopidogrel Tablet 75 milliGRAM(s) Oral daily  enoxaparin Injectable 40 milliGRAM(s) SubCutaneous every 24 hours  polyethylene glycol 3350 17 Gram(s) Oral daily  senna 2 Tablet(s) Oral at bedtime    MEDICATIONS  (PRN):  acetaminophen     Tablet .. 650 milliGRAM(s) Oral every 6 hours PRN Temp greater or equal to 38C (100.4F), Mild Pain (1 - 3)  bisacodyl 5 milliGRAM(s) Oral every 12 hours PRN Constipation  ondansetron   Disintegrating Tablet 4 milliGRAM(s) Oral three times a day PRN Nausea and/or Vomiting      ___________________________________________________________________________________________    Physical Exam:   Constitutional - NAD, Comfortable  HEENT - NCAT, EOMI  Chest - good chest expansion, good respiratory effort, CTAB + loop recorder site c/d/i.   Cardio - warm and well perfused.  Abdomen -  Soft, NTND  Extremities - No peripheral edema, No calf tenderness. + tenderness in the L shoulder. Likely due to impingement. Good ROM overall.   Neurologic Exam:                    Cognitive -             Orientation: Awake, Alert, AAO to self, place, date, year, situation            Attention:  Able to repeat. Able to recall 1/3 objects after 5 mins. Unable to do serial 7's.             Memory: Recent  memory intact            Thought: process, content appropriate     Speech - Expressive aphasia, Comprehensible, Mild dysarthria, able to repeat.      Cranial Nerves - No facial asymmetry, Tongue midline, Shoulder shrug intact     Motor -                      LEFT    UE - ShAB 5/5, EF 5/5, EE 5/5, WE 5/5,  WNL                    RIGHT UE - ShAB 5/5, EF 5/5, EE 5/5, WE 5/5,  WNL                    LEFT    LE - HF 4/5, KE 5/5, DF 5/5, PF 5/5                    RIGHT LE - HF 4/5, KE 5/5, DF 5/5, PF 5/5        Sensory - Intact to LT bilateral  Psychiatric - Mood stable, Affect WNL  Skin: Hematoma noted on the RLQ and at left breast medial to the areola improving

## 2023-01-26 NOTE — PROGRESS NOTE ADULT - ASSESSMENT
ASSESSMENT/PLAN  This is a 78 YO  woman with PMH  of HTN who presented to [facility]  on 1/11 with expressive aphasia since 1/8. MRI showed Left parietal acute infarct. She was also found to have stable large hematoma on her RLQ abdomen. Patient now with gait Instability, ADL impairments and Functional impairments.    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  - aphasia  - dysarthia  - dysphagia  - Continue Comprehensive Rehab Program: PT/OT/ST, 3hours daily and 5 days weekly  - PT: Focused on improving strength, endurance, coordination, balance, functional mobility, and transfers  - OT: Focused on improving strength, fine motor skills, coordination, posture and ADLs.    - ST: to diagnose and treat deficits in swallowing, cognition and communication.   - c/w aspirin, plavix and high dose statin  - ILR placed on 1/13/23     #HTN  - Amlodipine increased to 10mg daily.   - BP is stable.     #HLD  - Lipitor 80mg daily    # CKD  - monitor kidney function, BMP, electrolytes  - WNL this am ( 1/26)      #Hematoma  - stable hematoma on her RLQ abdomen and the Left Breast.   - Continue to monitor    #Pain management  - Tylenol PRN    #DVT ppx  - Lovenox   - last dopplers: Negative for DVT on 1/15    #Bowel Regimen  - Senna, miralax     #Bladder management  - BS on admission, and q 8 hours (SC if > 400)  - Monitor UO    #FEN   - Diet: DASH     #Skin:  - Skin on admission: Large hematoma on her RLQ abdomen and the Left Breast.   - Pressure injury/Skin: Turn Q2hrs while in bed, OOB to Chair, PT/OT     #Dysphagia    - SLP: evaluation and treatment    #Sleep:   - Maintain quiet hours and low stim environment.  - Melatonin PRN to maximize participation in therapy during the day.     #Precaution  - Fall, Aspiration    #GOC  CODE STATUS: FULL CODE       IDTR     # Case discussed in IDT rounds 1/19  - She lives in apartment with 2 steps and has support from 2 daughter.   - She is setup for grooming and eating. CG for UBD and LBD. Min assist for toileting. Goal   - She is walking 70 feet with RW with min assist. she is able to do 8 steps with CG / Min [email or web address]   - Regular + thin. Expressive aphasia. Mild dysarthria. Pending cognitive evaluation.   - goals: Ambulate to the bathroom with supervision and supervision with transfer.   - target dc home 1/31.   - caregiver training     ASSESSMENT/PLAN  This is a 76 YO  woman with PMH  of HTN who presented to [place]  on 1/11 with expressive aphasia since 1/8. MRI showed Left parietal acute infarct. She was also found to have stable large hematoma on her RLQ abdomen. Patient now with gait Instability, ADL impairments and Functional impairments.    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  - aphasia  - dysarthia  - dysphagia  - Continue Comprehensive Rehab Program: PT/OT/ST, 3hours daily and 5 days weekly  - PT: Focused on improving strength, endurance, coordination, balance, functional mobility, and transfers  - OT: Focused on improving strength, fine motor skills, coordination, posture and ADLs.    - ST: to diagnose and treat deficits in swallowing, cognition and communication.   - c/w aspirin, plavix and high dose statin  - ILR placed on 1/13/23     #HTN  - Amlodipine increased to 10mg daily.   - BP is stable.     #HLD  - Lipitor 80mg daily    # CKD  - monitor kidney function, BMP, electrolytes  - WNL this am ( 1/26)      #Hematoma  - stable hematoma on her RLQ abdomen and the Left Breast.   - Continue to monitor    #Pain management  - Tylenol PRN    #DVT ppx  - Lovenox   - last dopplers: Negative for DVT on 1/15    #Bowel Regimen  - Senna, miralax     #Bladder management  - BS on admission, and q 8 hours (SC if > 400)  - Monitor UO    #FEN   - Diet: DASH     #Skin:  - Skin on admission: Large hematoma on her RLQ abdomen and the Left Breast.   - Pressure injury/Skin: Turn Q2hrs while in bed, OOB to Chair, PT/OT     #Dysphagia    - SLP: evaluation and treatment    #Sleep:   - Maintain quiet hours and low stim environment.  - Melatonin PRN to maximize participation in therapy during the day.     #Precaution  - Fall, Aspiration    #GOC  CODE STATUS: FULL CODE       IDTR     # Case discussed in IDT rounds 1/26   - She lives in apartment with 2 steps and has support from 2 daughter.   - She is setup for grooming and eating. She is CG or supervision for the rest of the ADL's.   - She is walking 75 feet with RW with CG or supervision.   - Regular + thin. Expressive aphasia. Mild dysarthria. Pending cognitive evaluation.   - goals: Ambulate to the bathroom with supervision and supervision with transfer.   - target dc home 1/31.   - caregiver training

## 2023-01-26 NOTE — PROGRESS NOTE ADULT - ASSESSMENT
77 year old PMH HTN presented to [facility] on 1/11 after having expressive aphasia since 1/8,  found to have acute Left parietal infarct with L distal M2 occlusion. 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

## 2023-01-27 PROCEDURE — 99232 SBSQ HOSP IP/OBS MODERATE 35: CPT

## 2023-01-27 PROCEDURE — 90791 PSYCH DIAGNOSTIC EVALUATION: CPT

## 2023-01-27 RX ADMIN — AMLODIPINE BESYLATE 10 MILLIGRAM(S): 2.5 TABLET ORAL at 05:26

## 2023-01-27 RX ADMIN — ATORVASTATIN CALCIUM 80 MILLIGRAM(S): 80 TABLET, FILM COATED ORAL at 22:22

## 2023-01-27 RX ADMIN — Medication 81 MILLIGRAM(S): at 11:30

## 2023-01-27 RX ADMIN — CLOPIDOGREL BISULFATE 75 MILLIGRAM(S): 75 TABLET, FILM COATED ORAL at 11:30

## 2023-01-27 RX ADMIN — ENOXAPARIN SODIUM 40 MILLIGRAM(S): 100 INJECTION SUBCUTANEOUS at 22:22

## 2023-01-27 NOTE — PROGRESS NOTE ADULT - SUBJECTIVE AND OBJECTIVE BOX
HPI  This is a 77-year-old right-handed female patient with PMH  of HTN who presented to [place]  on 1/11 with expressive aphasia since 1/8. Daughter found the patient in her bedroom. She was trying to tell her daughter that she had trouble speaking. She knew what she wanted to say, but couldn't find the words to express herself. The daughter reports she had a similar episode in the past and was found to have a UTI. She was treated with antibiotics and symptoms resolved. She took her to urgent care since she thought this was a UTI and they prescribed her antibiotics. Patient did not improve with antibiotics, which prompted them to come to the ED. Patient is noncompliant with blood pressure medication.  At baseline, patient occasionally requires assistance to ambulate, but refuses to use walker. She requires some assistance with ADLs. NIHSS 4, pre-mRS 2. MRI showed Left parietal acute infarct.    Patient started on ASA 81mg indefinitely and Plavix for 3 months per ALIN. Patient with a stable large hematoma on her RLQ abdomen for which chemical DVT prophylaxis was not started. Duplex of the lower extremities neg.  and pt started on atorvastatin 80mg daily (titrate to LDL <70). Cardiology consulted, -160s and started on amlodipine 5mg daily. ILR placed on 1/13/23 to monitor for arrhythmias.    Patient was evaluated by PM&R and therapy for functional deficits, gait/ADL impairments and acute rehabilitation was recommended. Patient was medically optimized for discharge to [facility] IRU on 1/17/2022.    TDD - 1/31 Home  ___________________________________________________________________________________________    SUBJECTIVE  Patient seen and examined at bedside  No events overnight.   Denies any pain at this time.   Smo discomfort on left shoulder which is better this am.   Denies any-other complaints at this time.   Participating in therapy   Blood pressure in better controlled   Psychology is following for adjustment with the new CVA.    ___________________________________________________________________________________________      PSYCHOLOGY -Plan: Supportive psychotherapy sessions are recommended to address mood and assist with adjustment to CVA. Patient is in agreement with plan. Patient may also benefit from recreation/creative arts therapy to increase positive social interactions and to promote sense of task mastery and completion. Neuropsychology remains available.      ___________________________________________________________________________________________    T(C): 36.9 (01-27-23 @ 08:53)  T(F): 98.4 (01-27-23 @ 08:53), Max: 98.4 (01-27-23 @ 08:53)  HR: 80 (01-27-23 @ 08:53) (71 - 83)  BP: 127/69 (01-27-23 @ 08:53) (123/67 - 154/78)  RR:  (15 - 17)  SpO2:  (96% - 98%)  Wt(kg): --    LABS                        13.3   4.31  )-----------( 297      ( 26 Jan 2023 06:17 )             40.1     01-26    139  |  102  |  18  ----------------------------<  113<H>  4.0   |  25  |  1.24    Ca    9.1      26 Jan 2023 06:17    TPro  7.6  /  Alb  3.4  /  TBili  0.5  /  DBili  x   /  AST  33  /  ALT  62<H>  /  AlkPhos  92  01-26        ___________________________________________________________________________________________    MEDICATIONS  (STANDING):  MEDICATIONS  (STANDING):  amLODIPine   Tablet 10 milliGRAM(s) Oral daily  aspirin enteric coated 81 milliGRAM(s) Oral daily  atorvastatin 80 milliGRAM(s) Oral at bedtime  clopidogrel Tablet 75 milliGRAM(s) Oral daily  enoxaparin Injectable 40 milliGRAM(s) SubCutaneous every 24 hours  polyethylene glycol 3350 17 Gram(s) Oral daily  senna 2 Tablet(s) Oral at bedtime    MEDICATIONS  (PRN):  acetaminophen     Tablet .. 650 milliGRAM(s) Oral every 6 hours PRN Temp greater or equal to 38C (100.4F), Mild Pain (1 - 3)  bisacodyl 5 milliGRAM(s) Oral every 12 hours PRN Constipation  ondansetron   Disintegrating Tablet 4 milliGRAM(s) Oral three times a day PRN Nausea and/or Vomiting      ___________________________________________________________________________________________    Physical Exam:   Constitutional - NAD, Comfortable  HEENT - NCAT, EOMI  Chest - good chest expansion, good respiratory effort, CTAB + loop recorder site c/d/i.   Cardio - warm and well perfused.  Abdomen -  Soft, NTND  Extremities - No peripheral edema, No calf tenderness. + tenderness in the L shoulder. Likely due to impingement. Good ROM overall.   Neurologic Exam:                    Cognitive -             Orientation: Awake, Alert, AAO to self, place, date, year, situation            Attention:  Able to repeat. Able to recall 1/3 objects after 5 mins. Unable to do serial 7's.             Memory: Recent  memory intact            Thought: process, content appropriate     Speech - Expressive aphasia, Comprehensible, Mild dysarthria, able to repeat.      Cranial Nerves - No facial asymmetry, Tongue midline, Shoulder shrug intact     Motor -                      LEFT    UE - ShAB 5/5, EF 5/5, EE 5/5, WE 5/5,  WNL                    RIGHT UE - ShAB 5/5, EF 5/5, EE 5/5, WE 5/5,  WNL                    LEFT    LE - HF 4/5, KE 5/5, DF 5/5, PF 5/5                    RIGHT LE - HF 4/5, KE 5/5, DF 5/5, PF 5/5        Sensory - Intact to LT bilateral  Psychiatric - Mood stable, Affect WNL  Skin: Hematoma noted on the RLQ and at left breast medial to the areola improving

## 2023-01-27 NOTE — PROGRESS NOTE ADULT - SUBJECTIVE AND OBJECTIVE BOX
Patient is a 77y old  Female who presents with a chief complaint of CVA     Patient seen and examined at bedside. no new complaints, eating breakfast, no overnight events.     Vital Signs Last 24 Hrs  T(C): 36.9 (27 Jan 2023 08:53), Max: 36.9 (27 Jan 2023 08:53)  T(F): 98.4 (27 Jan 2023 08:53), Max: 98.4 (27 Jan 2023 08:53)  HR: 80 (27 Jan 2023 08:53) (71 - 83)  BP: 127/69 (27 Jan 2023 08:53) (123/67 - 154/78)  BP(mean): --  RR: 17 (27 Jan 2023 08:53) (15 - 17)  SpO2: 96% (27 Jan 2023 08:53) (96% - 98%)    Parameters below as of 27 Jan 2023 08:53  Patient On (Oxygen Delivery Method): room air      GENERAL- NAD  EAR/NOSE/MOUTH/THROAT - no pharyngeal exudates, no oral leisions,  MMM  EYES- ERI, conjunctiva and Sclera clear  NECK- supple  RESPIRATORY-  clear to auscultation bilaterally, non laboured breathing  CARDIOVASCULAR - SIS2, RRR  GI - soft NT BS present  EXTREMITIES- no pedal edema  NEUROLOGY- no gross focal deficits  PSYCHIATRY- AAO X 2                  13.3                 139  | 25   | 18           4.31  >-----------< 297     ------------------------< 113                   40.1                 4.0  | 102  | 1.24                                         Ca 9.1   Mg x     Ph x            MEDICATIONS  (STANDING):  amLODIPine   Tablet 10 milliGRAM(s) Oral daily  aspirin enteric coated 81 milliGRAM(s) Oral daily  atorvastatin 80 milliGRAM(s) Oral at bedtime  clopidogrel Tablet 75 milliGRAM(s) Oral daily  enoxaparin Injectable 40 milliGRAM(s) SubCutaneous every 24 hours  polyethylene glycol 3350 17 Gram(s) Oral daily  senna 2 Tablet(s) Oral at bedtime    MEDICATIONS  (PRN):  acetaminophen     Tablet .. 650 milliGRAM(s) Oral every 6 hours PRN Temp greater or equal to 38C (100.4F), Mild Pain (1 - 3)  bisacodyl 5 milliGRAM(s) Oral every 12 hours PRN Constipation  ondansetron   Disintegrating Tablet 4 milliGRAM(s) Oral three times a day PRN Nausea and/or Vomiting

## 2023-01-27 NOTE — PROGRESS NOTE ADULT - ASSESSMENT
ASSESSMENT/PLAN  This is a 76 YO  woman with PMH  of HTN who presented to [place]  on 1/11 with expressive aphasia since 1/8. MRI showed Left parietal acute infarct. She was also found to have stable large hematoma on her RLQ abdomen. Patient now with gait Instability, ADL impairments and Functional impairments.    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  - aphasia  - dysarthia  - dysphagia  - Continue Comprehensive Rehab Program: PT/OT/ST, 3hours daily and 5 days weekly  - PT: Focused on improving strength, endurance, coordination, balance, functional mobility, and transfers  - OT: Focused on improving strength, fine motor skills, coordination, posture and ADLs.    - ST: to diagnose and treat deficits in swallowing, cognition and communication.   - c/w aspirin, plavix and high dose statin  - ILR placed on 1/13/23   - Psychology is following for adjustment with the new CVA.  - Added recreational therapy.     #HTN  - Amlodipine increased to 10mg daily.   - BP is stable.     #HLD  - Lipitor 80mg daily    # CKD  - monitor kidney function, BMP, electrolytes  - WNL this am ( 1/26)      #Hematoma  - stable hematoma on her RLQ abdomen and the Left Breast.   - Continue to monitor    #Pain management  - Tylenol PRN    #DVT ppx  - Lovenox   - last dopplers: Negative for DVT on 1/15    #Bowel Regimen  - Senna, miralax     #Bladder management  - BS on admission, and q 8 hours (SC if > 400)  - Monitor UO    #FEN   - Diet: DASH     #Skin:  - Skin on admission: Large hematoma on her RLQ abdomen and the Left Breast.   - Pressure injury/Skin: Turn Q2hrs while in bed, OOB to Chair, PT/OT     #Dysphagia    - SLP: evaluation and treatment    #Sleep:   - Maintain quiet hours and low stim environment.  - Melatonin PRN to maximize participation in therapy during the day.     #Precaution  - Fall, Aspiration    #GOC  CODE STATUS: FULL CODE       IDTR     # Case discussed in IDT rounds 1/26   - She lives in apartment with 2 steps and has support from 2 daughter.   - She is setup for grooming and eating. She is CG or supervision for the rest of the ADL's.   - She is walking 75 feet with RW with CG or supervision.   - Regular + thin. Expressive aphasia. Mild dysarthria. Pending cognitive evaluation.   - goals: Ambulate to the bathroom with supervision and supervision with transfer.   - target dc home 1/31.   - caregiver training

## 2023-01-27 NOTE — CONSULT NOTE ADULT - SUBJECTIVE AND OBJECTIVE BOX
Patient participated alone for 55-minute, initial psychology consultation. Family members are not present. Neuropsychologist is introduced as a member of the rehabilitation team and the purpose of the consultation is explained. Patient agrees to participate.     Medical records are reviewed. Per records: Iraida Ding is a 77-year-old, woman with PMH of HTN who presented to [place] on 1/11/2023 with expressive aphasia since 1/8/2023. MRI showed left parietal acute infarct. She was also found to have stable large hematoma on her RLQ abdomen. Patient now with gait Instability, ADL impairments and functional impairments.

## 2023-01-27 NOTE — CONSULT NOTE ADULT - ASSESSMENT
Patient seated in wheelchair at bedside. She is alert and oriented to person, place, date (knows day of week, month, year and unsure of date) and situation. She denies pain at this time. Speech is mildly dysarthric yet comprehensible, and with word finding difficulties. She speaks softly and indicates this is longstanding and intentional, "mannerly." Mood appears depressed, with restricted affect. Patient states she is not feeling depressed, per se. Regarding stroke recovery, she states, "I'm feeling much better" and making gains in her rehab sessions. She indicates variable sleep depending on environmental factors (lights on/off in the room). She reports adequate appetite. She reports some restlessness/boredom during the day when not in rehab sessions, as she does not like to watch television. Patient denies SI/HI/I/P. She denies AH/VH. On self-report screening measures of recent mood, responses indicate the presence of minimal anxiety (GAS-10 = 3/30) and minimal depression (GDS-Short = 2/15).     She denies prior psychiatric history. Patient denies use of ETOH or other substances. Early development is reported as typical. She was born and raised in [facility]. 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 She resides with her daughter and 2 granddaughters. She also has another daughter and a son. Her support system includes family and friend(s). Her good friend spends part of the year in [place] and is currently there, and patient states she is unable to reach her by phone. She is tearful when speaking about her friend, as she would like her support at this time. Patient aware that discharge to home is tentatively expected for Tuesday 1/31/2023. She resides with her daughter and granddaughters (Meka and Zhanna).     Impression: Patient is adjusting to changes in functioning (e.g., cognitive-linguistic, physical) associated with her recent stroke. Psychoeducation is provided regarding impact of CVA of cognitive and emotional functioning. Plan: Supportive psychotherapy sessions are recommended to address mood and assist with adjustment to CVA. Patient is in agreement with plan. Patient may also benefit from recreation/creative arts therapy to increase positive social interactions and to promote sense of task mastery and completion. Neuropsychology remains available.    
77 year old PMH HTN presented to [facility] on 1/11 after having expressive aphasia since 1/8, found to have L parietal CVA admitted to  rehab for ADL impairment     #CVA  #ADL impairment   - found to have acute Left parietal infarct with L distal M2 occlusion. 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

## 2023-01-28 PROCEDURE — 99232 SBSQ HOSP IP/OBS MODERATE 35: CPT

## 2023-01-28 RX ADMIN — CLOPIDOGREL BISULFATE 75 MILLIGRAM(S): 75 TABLET, FILM COATED ORAL at 11:38

## 2023-01-28 RX ADMIN — Medication 81 MILLIGRAM(S): at 11:38

## 2023-01-28 RX ADMIN — AMLODIPINE BESYLATE 10 MILLIGRAM(S): 2.5 TABLET ORAL at 05:41

## 2023-01-28 RX ADMIN — ENOXAPARIN SODIUM 40 MILLIGRAM(S): 100 INJECTION SUBCUTANEOUS at 21:28

## 2023-01-28 RX ADMIN — ATORVASTATIN CALCIUM 80 MILLIGRAM(S): 80 TABLET, FILM COATED ORAL at 21:32

## 2023-01-28 NOTE — PROGRESS NOTE ADULT - SUBJECTIVE AND OBJECTIVE BOX
Patient is a 77y old  Female who presents with a chief complaint of CVA (27 Jan 2023 15:16)      Patient seen and examined at bedside.  Denies chest pain, dyspnea, abd pain.    ALLERGIES:  No Known Allergies    MEDICATIONS  (STANDING):  amLODIPine   Tablet 10 milliGRAM(s) Oral daily  aspirin enteric coated 81 milliGRAM(s) Oral daily  atorvastatin 80 milliGRAM(s) Oral at bedtime  clopidogrel Tablet 75 milliGRAM(s) Oral daily  enoxaparin Injectable 40 milliGRAM(s) SubCutaneous every 24 hours  polyethylene glycol 3350 17 Gram(s) Oral daily  senna 2 Tablet(s) Oral at bedtime    MEDICATIONS  (PRN):  acetaminophen     Tablet .. 650 milliGRAM(s) Oral every 6 hours PRN Temp greater or equal to 38C (100.4F), Mild Pain (1 - 3)  bisacodyl 5 milliGRAM(s) Oral every 12 hours PRN Constipation  ondansetron   Disintegrating Tablet 4 milliGRAM(s) Oral three times a day PRN Nausea and/or Vomiting    Vital Signs Last 24 Hrs  T(F): 98.3 (28 Jan 2023 09:19), Max: 98.5 (27 Jan 2023 22:20)  HR: 76 (28 Jan 2023 09:19) (73 - 78)  BP: 134/77 (28 Jan 2023 09:19) (134/77 - 151/76)  RR: 17 (28 Jan 2023 09:19) (17 - 17)  SpO2: 95% (28 Jan 2023 09:19) (95% - 96%)  I&O's Summary    BMI (kg/m2): 29.7 (01-25-23 @ 15:39)  PHYSICAL EXAM:  General: NAD, A/O x 3  ENT: MMM  Neck: Supple, No JVD  Lungs: Clear to auscultation bilaterally  Cardio: RRR, S1/S2, No murmurs  Abdomen: Soft, Nontender, Nondistended; Bowel sounds present  Extremities: No calf tenderness, No pitting edema    LABS:                        13.3   4.31  )-----------( 297      ( 26 Jan 2023 06:17 )             40.1       01-26    139  |  102  |  18  ----------------------------<  113  4.0   |  25  |  1.24    Ca    9.1      26 Jan 2023 06:17    TPro  7.6  /  Alb  3.4  /  TBili  0.5  /  DBili  x   /  AST  33  /  ALT  62  /  AlkPhos  92  01-26 01-12 Chol 184 mg/dL LDL -- HDL 36 mg/dL Trig 78 mg/dL                      COVID-19 PCR: Justin (01-17-23 @ 22:30)  COVID-19 PCR: Justin (01-16-23 @ 09:35)      RADIOLOGY & ADDITIONAL TESTS:    Care Discussed with Consultants/Other Providers:

## 2023-01-28 NOTE — PROGRESS NOTE ADULT - ASSESSMENT
77 year old PMH HTN presented to [facility] on 1/11 after having expressive aphasia since 1/8,  found to have acute Left parietal infarct with L distal M2 occlusion. 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

## 2023-01-28 NOTE — PROGRESS NOTE ADULT - SUBJECTIVE AND OBJECTIVE BOX
Cc: Gait dysfunction 2/2 CVA    HPI: Patient with no new medical issues today.  Pain controlled, no chest pain, no N/V, no Fevers/Chills. No other new ROS  Has been tolerating rehabilitation program.    acetaminophen     Tablet .. 650 milliGRAM(s) Oral every 6 hours PRN  amLODIPine   Tablet 10 milliGRAM(s) Oral daily  aspirin enteric coated 81 milliGRAM(s) Oral daily  atorvastatin 80 milliGRAM(s) Oral at bedtime  bisacodyl 5 milliGRAM(s) Oral every 12 hours PRN  clopidogrel Tablet 75 milliGRAM(s) Oral daily  enoxaparin Injectable 40 milliGRAM(s) SubCutaneous every 24 hours  ondansetron   Disintegrating Tablet 4 milliGRAM(s) Oral three times a day PRN  polyethylene glycol 3350 17 Gram(s) Oral daily  senna 2 Tablet(s) Oral at bedtime      T(C): 36.8 (01-28-23 @ 09:19), Max: 36.9 (01-27-23 @ 22:20)  HR: 76 (01-28-23 @ 09:19) (73 - 78)  BP: 134/77 (01-28-23 @ 09:19) (134/77 - 151/76)  RR: 17 (01-28-23 @ 09:19) (17 - 17)  SpO2: 95% (01-28-23 @ 09:19) (95% - 96%)    In NAD   HEENT- EOMI  Heart- RRR, S1S2  Lungs- no respiratory distress  Abd-  NT  Ext- No calf pain  Neuro- Exam unchanged          Imp: 77 year old PMH HTN presented to [facility] on 1/11 after having expressive aphasia since 1/8,  found to have acute Left parietal infarct with L distal M2 occlusion. 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   - Patient is stable to continue current rehabilitation program.

## 2023-01-29 PROCEDURE — 99232 SBSQ HOSP IP/OBS MODERATE 35: CPT

## 2023-01-29 RX ADMIN — CLOPIDOGREL BISULFATE 75 MILLIGRAM(S): 75 TABLET, FILM COATED ORAL at 11:22

## 2023-01-29 RX ADMIN — ENOXAPARIN SODIUM 40 MILLIGRAM(S): 100 INJECTION SUBCUTANEOUS at 21:31

## 2023-01-29 RX ADMIN — Medication 81 MILLIGRAM(S): at 11:22

## 2023-01-29 RX ADMIN — ATORVASTATIN CALCIUM 80 MILLIGRAM(S): 80 TABLET, FILM COATED ORAL at 21:30

## 2023-01-29 RX ADMIN — AMLODIPINE BESYLATE 10 MILLIGRAM(S): 2.5 TABLET ORAL at 06:09

## 2023-01-29 NOTE — PROGRESS NOTE ADULT - SUBJECTIVE AND OBJECTIVE BOX
Patient is a 77y old  Female who presents with a chief complaint of CVA (29 Jan 2023 09:28)      Patient seen and examined at bedside.  Denies chest pain, dyspnea, abd pain.    ALLERGIES:  No Known Allergies    MEDICATIONS  (STANDING):  amLODIPine   Tablet 10 milliGRAM(s) Oral daily  aspirin enteric coated 81 milliGRAM(s) Oral daily  atorvastatin 80 milliGRAM(s) Oral at bedtime  clopidogrel Tablet 75 milliGRAM(s) Oral daily  enoxaparin Injectable 40 milliGRAM(s) SubCutaneous every 24 hours  polyethylene glycol 3350 17 Gram(s) Oral daily  senna 2 Tablet(s) Oral at bedtime    MEDICATIONS  (PRN):  acetaminophen     Tablet .. 650 milliGRAM(s) Oral every 6 hours PRN Temp greater or equal to 38C (100.4F), Mild Pain (1 - 3)  bisacodyl 5 milliGRAM(s) Oral every 12 hours PRN Constipation  ondansetron   Disintegrating Tablet 4 milliGRAM(s) Oral three times a day PRN Nausea and/or Vomiting    Vital Signs Last 24 Hrs  T(F): 98.1 (29 Jan 2023 08:56), Max: 98.4 (28 Jan 2023 20:51)  HR: 74 (29 Jan 2023 08:56) (68 - 74)  BP: 137/78 (29 Jan 2023 08:56) (130/64 - 145/80)  RR: 17 (29 Jan 2023 08:56) (17 - 18)  SpO2: 96% (29 Jan 2023 08:56) (96% - 97%)  I&O's Summary    BMI (kg/m2): 29.7 (01-25-23 @ 15:39)  PHYSICAL EXAM:  General: NAD, A/O x 3  ENT: MMM  Neck: Supple, No JVD  Lungs: Clear to auscultation bilaterally  Cardio: RRR, S1/S2, No murmurs  Abdomen: Soft, Nontender, Nondistended; Bowel sounds present  Extremities: No calf tenderness, No pitting edema    LABS:                        01-12 Chol 184 mg/dL LDL -- HDL 36 mg/dL Trig 78 mg/dL                      COVID-19 PCR: NotDetec (01-17-23 @ 22:30)  COVID-19 PCR: NotDetec (01-16-23 @ 09:35)      RADIOLOGY & ADDITIONAL TESTS:    Care Discussed with Consultants/Other Providers:

## 2023-01-29 NOTE — PROGRESS NOTE ADULT - ASSESSMENT
77 year old PMH HTN presented to [facility] on 1/11 after having expressive aphasia since 1/8,  found to have acute Left parietal infarct with L distal M2 occlusion. 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

## 2023-01-29 NOTE — PROGRESS NOTE ADULT - SUBJECTIVE AND OBJECTIVE BOX
Cc: Gait dysfunction 2/2 CVA    HPI: Patient with no new medical issues today.  Denies complaint, sitting in wheelchair about to eat breakfast.   Pain controlled, no chest pain, no N/V, no Fevers/Chills. No other new ROS  Has been tolerating rehabilitation program.    acetaminophen     Tablet .. 650 milliGRAM(s) Oral every 6 hours PRN  amLODIPine   Tablet 10 milliGRAM(s) Oral daily  aspirin enteric coated 81 milliGRAM(s) Oral daily  atorvastatin 80 milliGRAM(s) Oral at bedtime  bisacodyl 5 milliGRAM(s) Oral every 12 hours PRN  clopidogrel Tablet 75 milliGRAM(s) Oral daily  enoxaparin Injectable 40 milliGRAM(s) SubCutaneous every 24 hours  ondansetron   Disintegrating Tablet 4 milliGRAM(s) Oral three times a day PRN  polyethylene glycol 3350 17 Gram(s) Oral daily  senna 2 Tablet(s) Oral at bedtime      T(C): 36.7 (01-29-23 @ 08:56), Max: 36.9 (01-28-23 @ 20:51)  HR: 74 (01-29-23 @ 08:56) (68 - 74)  BP: 137/78 (01-29-23 @ 08:56) (130/64 - 145/80)  RR: 17 (01-29-23 @ 08:56) (17 - 18)  SpO2: 96% (01-29-23 @ 08:56) (96% - 97%)     In NAD   HEENT- EOMI  Heart- RRR, S1S2  Lungs- no respiratory distress  Abd-  NT  Ext- No calf pain  Neuro- Exam unchanged      Imp: 77 year old PMH HTN presented to [facility] on 1/11 after having expressive aphasia since 1/8,  found to have acute Left parietal infarct with L distal M2 occlusion. 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   - Patient is stable to continue current rehabilitation program.

## 2023-01-30 ENCOUNTER — TRANSCRIPTION ENCOUNTER (OUTPATIENT)
Age: 78
End: 2023-01-30

## 2023-01-30 LAB
ALBUMIN SERPL ELPH-MCNC: 3.3 G/DL — SIGNIFICANT CHANGE UP (ref 3.3–5)
ALP SERPL-CCNC: 96 U/L — SIGNIFICANT CHANGE UP (ref 40–120)
ALT FLD-CCNC: 45 U/L — SIGNIFICANT CHANGE UP (ref 10–45)
ANION GAP SERPL CALC-SCNC: 11 MMOL/L — SIGNIFICANT CHANGE UP (ref 5–17)
AST SERPL-CCNC: 35 U/L — SIGNIFICANT CHANGE UP (ref 10–40)
BILIRUB SERPL-MCNC: 0.4 MG/DL — SIGNIFICANT CHANGE UP (ref 0.2–1.2)
BUN SERPL-MCNC: 16 MG/DL — SIGNIFICANT CHANGE UP (ref 7–23)
CALCIUM SERPL-MCNC: 9 MG/DL — SIGNIFICANT CHANGE UP (ref 8.4–10.5)
CHLORIDE SERPL-SCNC: 103 MMOL/L — SIGNIFICANT CHANGE UP (ref 96–108)
CO2 SERPL-SCNC: 25 MMOL/L — SIGNIFICANT CHANGE UP (ref 22–31)
CREAT SERPL-MCNC: 1.08 MG/DL — SIGNIFICANT CHANGE UP (ref 0.5–1.3)
EGFR: 53 ML/MIN/1.73M2 — LOW
GLUCOSE SERPL-MCNC: 113 MG/DL — HIGH (ref 70–99)
HCT VFR BLD CALC: 42 % — SIGNIFICANT CHANGE UP (ref 34.5–45)
HGB BLD-MCNC: 13.6 G/DL — SIGNIFICANT CHANGE UP (ref 11.5–15.5)
MCHC RBC-ENTMCNC: 28.8 PG — SIGNIFICANT CHANGE UP (ref 27–34)
MCHC RBC-ENTMCNC: 32.4 GM/DL — SIGNIFICANT CHANGE UP (ref 32–36)
MCV RBC AUTO: 88.8 FL — SIGNIFICANT CHANGE UP (ref 80–100)
NRBC # BLD: 0 /100 WBCS — SIGNIFICANT CHANGE UP (ref 0–0)
PLATELET # BLD AUTO: 291 K/UL — SIGNIFICANT CHANGE UP (ref 150–400)
POTASSIUM SERPL-MCNC: 4.2 MMOL/L — SIGNIFICANT CHANGE UP (ref 3.5–5.3)
POTASSIUM SERPL-SCNC: 4.2 MMOL/L — SIGNIFICANT CHANGE UP (ref 3.5–5.3)
PROT SERPL-MCNC: 7.5 G/DL — SIGNIFICANT CHANGE UP (ref 6–8.3)
RBC # BLD: 4.73 M/UL — SIGNIFICANT CHANGE UP (ref 3.8–5.2)
RBC # FLD: 12.1 % — SIGNIFICANT CHANGE UP (ref 10.3–14.5)
SODIUM SERPL-SCNC: 139 MMOL/L — SIGNIFICANT CHANGE UP (ref 135–145)
WBC # BLD: 5.01 K/UL — SIGNIFICANT CHANGE UP (ref 3.8–10.5)
WBC # FLD AUTO: 5.01 K/UL — SIGNIFICANT CHANGE UP (ref 3.8–10.5)

## 2023-01-30 PROCEDURE — 99232 SBSQ HOSP IP/OBS MODERATE 35: CPT

## 2023-01-30 PROCEDURE — 90832 PSYTX W PT 30 MINUTES: CPT

## 2023-01-30 RX ORDER — ACETAMINOPHEN 500 MG
2 TABLET ORAL
Qty: 0 | Refills: 0 | DISCHARGE
Start: 2023-01-30

## 2023-01-30 RX ORDER — ATORVASTATIN CALCIUM 80 MG/1
1 TABLET, FILM COATED ORAL
Qty: 30 | Refills: 0
Start: 2023-01-30 | End: 2023-02-28

## 2023-01-30 RX ORDER — ONDANSETRON 8 MG/1
1 TABLET, FILM COATED ORAL
Qty: 0 | Refills: 0 | DISCHARGE
Start: 2023-01-30

## 2023-01-30 RX ORDER — AMLODIPINE BESYLATE 2.5 MG/1
1 TABLET ORAL
Qty: 30 | Refills: 0
Start: 2023-01-30 | End: 2023-02-28

## 2023-01-30 RX ORDER — POLYETHYLENE GLYCOL 3350 17 G/17G
17 POWDER, FOR SOLUTION ORAL
Qty: 0 | Refills: 0 | DISCHARGE
Start: 2023-01-30

## 2023-01-30 RX ORDER — CLOPIDOGREL BISULFATE 75 MG/1
1 TABLET, FILM COATED ORAL
Qty: 30 | Refills: 0
Start: 2023-01-30 | End: 2023-02-28

## 2023-01-30 RX ORDER — SENNA PLUS 8.6 MG/1
2 TABLET ORAL
Qty: 0 | Refills: 0 | DISCHARGE
Start: 2023-01-30

## 2023-01-30 RX ORDER — ASPIRIN/CALCIUM CARB/MAGNESIUM 324 MG
1 TABLET ORAL
Qty: 30 | Refills: 0
Start: 2023-01-30 | End: 2023-02-28

## 2023-01-30 RX ADMIN — ENOXAPARIN SODIUM 40 MILLIGRAM(S): 100 INJECTION SUBCUTANEOUS at 20:40

## 2023-01-30 RX ADMIN — CLOPIDOGREL BISULFATE 75 MILLIGRAM(S): 75 TABLET, FILM COATED ORAL at 11:48

## 2023-01-30 RX ADMIN — AMLODIPINE BESYLATE 10 MILLIGRAM(S): 2.5 TABLET ORAL at 05:45

## 2023-01-30 RX ADMIN — ATORVASTATIN CALCIUM 80 MILLIGRAM(S): 80 TABLET, FILM COATED ORAL at 20:40

## 2023-01-30 RX ADMIN — Medication 81 MILLIGRAM(S): at 11:48

## 2023-01-30 RX ADMIN — SENNA PLUS 2 TABLET(S): 8.6 TABLET ORAL at 20:40

## 2023-01-30 NOTE — PROGRESS NOTE ADULT - SUBJECTIVE AND OBJECTIVE BOX
HPI  This is a 77-year-old right-handed female patient with PMH  of HTN who presented to [place]  on 1/11 with expressive aphasia since 1/8. Daughter found the patient in her bedroom. She was trying to tell her daughter that she had trouble speaking. She knew what she wanted to say, but couldn't find the words to express herself. The daughter reports she had a similar episode in the past and was found to have a UTI. She was treated with antibiotics and symptoms resolved. She took her to urgent care since she thought this was a UTI and they prescribed her antibiotics. Patient did not improve with antibiotics, which prompted them to come to the ED. Patient is noncompliant with blood pressure medication.  At baseline, patient occasionally requires assistance to ambulate, but refuses to use walker. She requires some assistance with ADLs. NIHSS 4, pre-mRS 2. MRI showed Left parietal acute infarct.    Patient started on ASA 81mg indefinitely and Plavix for 3 months per ALIN. Patient with a stable large hematoma on her RLQ abdomen for which chemical DVT prophylaxis was not started. Duplex of the lower extremities neg.  and pt started on atorvastatin 80mg daily (titrate to LDL <70). Cardiology consulted, -160s and started on amlodipine 5mg daily. ILR placed on 1/13/23 to monitor for arrhythmias.    Patient was evaluated by PM&R and therapy for functional deficits, gait/ADL impairments and acute rehabilitation was recommended. Patient was medically optimized for discharge to [facility] IRU on 1/17/2022.    TDD - 1/31 Home  ___________________________________________________________________________________________    SUBJECTIVE  Patient seen and examined at bedside  No events overnight or over the weekend.   Denies any pain at this time.   Continue to have left shoulder pain. improving.   Denies any-other complaints at this time.   Participating in therapy   Blood pressure in better controlled   Psychology is following for adjustment with the new CVA.  Will call family for the pharmacy before d/c.    ___________________________________________________________________________________________      PSYCHOLOGY -Plan: Supportive psychotherapy sessions are recommended to address mood and assist with adjustment to CVA. Patient is in agreement with plan. Patient may also benefit from recreation/creative arts therapy to increase positive social interactions and to promote sense of task mastery and completion. Neuropsychology remains available.      ___________________________________________________________________________________________    T(C): 36.7 (01-30-23 @ 08:57)  T(F): 98.1 (01-30-23 @ 08:57), Max: 98.1 (01-29-23 @ 20:50)  HR: 71 (01-30-23 @ 08:57) (71 - 74)  BP: 129/72 (01-30-23 @ 08:57) (126/73 - 133/73)  RR:  (16 - 17)  SpO2:  (95% - 98%)  Wt(kg): --      LABS                                    13.6   5.01  )-----------( 291      ( 30 Jan 2023 06:40 )             42.0     01-30    139  |  103  |  16  ----------------------------<  113<H>  4.2   |  25  |  1.08    Ca    9.0      30 Jan 2023 06:40    TPro  7.5  /  Alb  3.3  /  TBili  0.4  /  DBili  x   /  AST  35  /  ALT  45  /  AlkPhos  96  01-30      ___________________________________________________________________________________________    MEDICATIONS  (STANDING):  amLODIPine   Tablet 10 milliGRAM(s) Oral daily  aspirin enteric coated 81 milliGRAM(s) Oral daily  atorvastatin 80 milliGRAM(s) Oral at bedtime  clopidogrel Tablet 75 milliGRAM(s) Oral daily  enoxaparin Injectable 40 milliGRAM(s) SubCutaneous every 24 hours  polyethylene glycol 3350 17 Gram(s) Oral daily  senna 2 Tablet(s) Oral at bedtime    MEDICATIONS  (PRN):  acetaminophen     Tablet .. 650 milliGRAM(s) Oral every 6 hours PRN Temp greater or equal to 38C (100.4F), Mild Pain (1 - 3)  bisacodyl 5 milliGRAM(s) Oral every 12 hours PRN Constipation  ondansetron   Disintegrating Tablet 4 milliGRAM(s) Oral three times a day PRN Nausea and/or Vomiting        ___________________________________________________________________________________________    Physical Exam:   Constitutional - NAD, Comfortable  HEENT - NCAT, EOMI  Chest - good chest expansion, good respiratory effort, CTAB + loop recorder site c/d/i.   Cardio - warm and well perfused.  Abdomen -  Soft, NTND  Extremities - No peripheral edema, No calf tenderness. + tenderness in the L shoulder. Likely due to impingement. Good ROM overall.   Neurologic Exam:                    Cognitive -             Orientation: Awake, Alert, AAO to self, place, date, year, situation            Attention:  Able to repeat. Able to recall 1/3 objects after 5 mins. Unable to do serial 7's.             Memory: Recent  memory intact            Thought: process, content appropriate     Speech - Expressive aphasia, Comprehensible, Mild dysarthria, able to repeat.      Cranial Nerves - No facial asymmetry, Tongue midline, Shoulder shrug intact     Motor -                      LEFT    UE - ShAB 5/5, EF 5/5, EE 5/5, WE 5/5,  WNL                    RIGHT UE - ShAB 5/5, EF 5/5, EE 5/5, WE 5/5,  WNL                    LEFT    LE - HF 4/5, KE 5/5, DF 5/5, PF 5/5                    RIGHT LE - HF 4/5, KE 5/5, DF 5/5, PF 5/5        Sensory - Intact to LT bilateral  Psychiatric - Mood stable, Affect WNL  Skin: Hematoma noted on the RLQ and at left breast medial to the areola improving    REVIEW OF SYSTEMS  Constitutional: No fever, No Chills, No fatigue  Pulm: No cough,  No shortness of breath  Cardio: No chest pain, No palpitations  GI:  No abdominal pain, No nausea, No vomiting, No diarrhea, No constipation  : No dysuria, No frequency, No hematuria  Neuro: No headaches, No memory loss, + loss of strength, No numbness, No tremors  Skin: No itching, No rashes, No lesions   MSK: No joint pain, No joint swelling, + muscle pain, No Neck or back pain   HPI  This is a 77-year-old right-handed female patient with PMH  of HTN who presented to [place]  on 1/11 with expressive aphasia since 1/8. Daughter found the patient in her bedroom. She was trying to tell her daughter that she had trouble speaking. She knew what she wanted to say, but couldn't find the words to express herself. The daughter reports she had a similar episode in the past and was found to have a UTI. She was treated with antibiotics and symptoms resolved. She took her to urgent care since she thought this was a UTI and they prescribed her antibiotics. Patient did not improve with antibiotics, which prompted them to come to the ED. Patient is noncompliant with blood pressure medication.  At baseline, patient occasionally requires assistance to ambulate, but refuses to use walker. She requires some assistance with ADLs. NIHSS 4, pre-mRS 2. MRI showed Left parietal acute infarct.    Patient started on ASA 81mg indefinitely and Plavix for 3 months per ALIN. Patient with a stable large hematoma on her RLQ abdomen for which chemical DVT prophylaxis was not started. Duplex of the lower extremities neg.  and pt started on atorvastatin 80mg daily (titrate to LDL <70). Cardiology consulted, -160s and started on amlodipine 5mg daily. ILR placed on 1/13/23 to monitor for arrhythmias.    Patient was evaluated by PM&R and therapy for functional deficits, gait/ADL impairments and acute rehabilitation was recommended. Patient was medically optimized for discharge to [facility] IRU on 1/17/2022.    TDD - 1/31 Home  ___________________________________________________________________________________________    SUBJECTIVE  Patient seen and examined at bedside  No events overnight or over the weekend.   Denies any pain at this time.   Describes left shoulder pain as discomfort.   Denies any-other complaints at this time.   Participating in therapy   Blood pressure in better controlled   Psychology is following for adjustment with the new CVA.    ___________________________________________________________________________________________      PSYCHOLOGY -Plan: Supportive psychotherapy sessions are recommended to address mood and assist with adjustment to CVA. Patient is in agreement with plan. Patient may also benefit from recreation/creative arts therapy to increase positive social interactions and to promote sense of task mastery and completion. Neuropsychology remains available.      ___________________________________________________________________________________________    T(C): 36.7 (01-30-23 @ 08:57)  T(F): 98.1 (01-30-23 @ 08:57), Max: 98.1 (01-29-23 @ 20:50)  HR: 71 (01-30-23 @ 08:57) (71 - 74)  BP: 129/72 (01-30-23 @ 08:57) (126/73 - 133/73)  RR:  (16 - 17)  SpO2:  (95% - 98%)  Wt(kg): --      LABS                                    13.6   5.01  )-----------( 291      ( 30 Jan 2023 06:40 )             42.0     01-30    139  |  103  |  16  ----------------------------<  113<H>  4.2   |  25  |  1.08    Ca    9.0      30 Jan 2023 06:40    TPro  7.5  /  Alb  3.3  /  TBili  0.4  /  DBili  x   /  AST  35  /  ALT  45  /  AlkPhos  96  01-30      ___________________________________________________________________________________________    MEDICATIONS  (STANDING):  amLODIPine   Tablet 10 milliGRAM(s) Oral daily  aspirin enteric coated 81 milliGRAM(s) Oral daily  atorvastatin 80 milliGRAM(s) Oral at bedtime  clopidogrel Tablet 75 milliGRAM(s) Oral daily  enoxaparin Injectable 40 milliGRAM(s) SubCutaneous every 24 hours  polyethylene glycol 3350 17 Gram(s) Oral daily  senna 2 Tablet(s) Oral at bedtime    MEDICATIONS  (PRN):  acetaminophen     Tablet .. 650 milliGRAM(s) Oral every 6 hours PRN Temp greater or equal to 38C (100.4F), Mild Pain (1 - 3)  bisacodyl 5 milliGRAM(s) Oral every 12 hours PRN Constipation  ondansetron   Disintegrating Tablet 4 milliGRAM(s) Oral three times a day PRN Nausea and/or Vomiting        ___________________________________________________________________________________________    Physical Exam:   Constitutional - NAD, Comfortable  HEENT - NCAT, EOMI  Chest - good chest expansion, good respiratory effort, CTAB + loop recorder site c/d/i.   Cardio - warm and well perfused.  Abdomen -  Soft, NTND  Extremities - No peripheral edema, No calf tenderness. + tenderness in the L shoulder. Likely due to impingement. Good ROM overall.   Neurologic Exam:                    Cognitive -             Orientation: Awake, Alert, AAO to self, place, date, year, situation            Attention:  Able to repeat. Able to recall 1/3 objects after 5 mins. Unable to do serial 7's.             Memory: Recent  memory intact            Thought: process, content appropriate     Speech - Expressive aphasia, Comprehensible, Mild dysarthria, able to repeat.      Cranial Nerves - No facial asymmetry, Tongue midline, Shoulder shrug intact     Motor -                      LEFT    UE - ShAB 5/5, EF 5/5, EE 5/5, WE 5/5,  WNL                    RIGHT UE - ShAB 5/5, EF 5/5, EE 5/5, WE 5/5,  WNL                    LEFT    LE - HF 4/5, KE 5/5, DF 5/5, PF 5/5                    RIGHT LE - HF 4/5, KE 5/5, DF 5/5, PF 5/5        Sensory - Intact to LT bilateral  Psychiatric - Mood stable, Affect WNL  Skin: Hematoma noted on the RLQ and at left breast medial to the areola improving    REVIEW OF SYSTEMS  Constitutional: No fever, No Chills, No fatigue  Pulm: No cough,  No shortness of breath  Cardio: No chest pain, No palpitations  GI:  No abdominal pain, No nausea, No vomiting, No diarrhea, No constipation  : No dysuria, No frequency, No hematuria  Neuro: No headaches, No memory loss, + loss of strength, No numbness, No tremors  Skin: No itching, No rashes, No lesions   MSK: No joint pain, No joint swelling, + muscle pain, No Neck or back pain

## 2023-01-30 NOTE — PROGRESS NOTE ADULT - ASSESSMENT
Patient is alert and oriented. Mood is neutral, with flat affect. She denies feelings of sadness, depressive thoughts, or anxiety. Psychoeducation is provided regarding CVA and cognitive/language functioning. Patient experiences word-finding difficulties, which can be frustrating. Reviewed strategies to manage stress in the moment. Plan: Neuropsychology remains available.

## 2023-01-30 NOTE — DISCHARGE NOTE PROVIDER - ATTENDING DISCHARGE PHYSICAL EXAMINATION:
Physical Exam:   Constitutional - NAD, Comfortable  HEENT - NCAT, EOMI  Chest - good chest expansion, good respiratory effort, CTAB + loop recorder site c/d/i.   Cardio - warm and well perfused.  Abdomen -  Soft, NTND  Extremities - No peripheral edema, No calf tenderness. + tenderness in the L shoulder. Likely due to impingement. Good ROM overall.   Neurologic Exam:                    Cognitive -             Orientation: Awake, Alert, AAO to self, place, date, year, situation            Attention:  Able to repeat. Able to recall 1/3 objects after 5 mins. Unable to do serial 7's.             Memory: Recent  memory intact            Thought: process, content appropriate     Speech - Expressive aphasia, Comprehensible, Mild dysarthria, able to repeat.      Cranial Nerves - No facial asymmetry, Tongue midline, Shoulder shrug intact     Motor -                      LEFT    UE - ShAB 5/5, EF 5/5, EE 5/5, WE 5/5,  WNL                    RIGHT UE - ShAB 5/5, EF 5/5, EE 5/5, WE 5/5,  WNL                    LEFT    LE - HF 4/5, KE 5/5, DF 5/5, PF 5/5                    RIGHT LE - HF 4/5, KE 5/5, DF 5/5, PF 5/5        Sensory - Intact to LT bilateral  Psychiatric - Mood stable, Affect WNL  Skin: Hematoma noted on the RLQ and at left breast medial to the areola improving

## 2023-01-30 NOTE — DISCHARGE NOTE PROVIDER - HOSPITAL COURSE
This is a 77-year-old right-handed female patient with PMH  of HTN who presented to [facility]  on 1/11 with expressive aphasia since 1/8. Daughter found the patient in her bedroom. She was trying to tell her daughter that she had trouble speaking. She knew what she wanted to say, but couldn't find the words to express herself. The daughter reports she had a similar episode in the past and was found to have a UTI. She was treated with antibiotics and symptoms resolved. She took her to urgent care since she thought this was a UTI and they prescribed her antibiotics. Patient did not improve with antibiotics, which prompted them to come to the ED. Patient is noncompliant with blood pressure medication.  At baseline, patient occasionally requires assistance to ambulate, but refuses to use walker. She requires some assistance with ADLs. NIHSS 4, pre-mRS 2. MRI showed Left parietal acute infarct.    Patient started on ASA 81mg indefinitely and Plavix for 3 months per ALIN. Patient with a stable large hematoma on her RLQ abdomen for which chemical DVT prophylaxis was not started. Duplex of the lower extremities neg.  and pt started on atorvastatin 80mg daily (titrate to LDL <70). Cardiology consulted, -160s and started on amlodipine 5mg daily. ILR placed on 1/13/23 to monitor for arrhythmias.    Patient was evaluated by PM&R and therapy for functional deficits, gait/ADL impairments and acute rehabilitation was recommended. Patient was medically optimized for discharge to [facility] IRU on 1/17/2022.      Patient participated in daily therapies and made good functional gains.  Rehab course notable for high BP. Her medication was adjusted to 10mg of Norvasc daily. she tolerated it well without any complication. She also had some left shoulder pain, looked like impingement and she will need outpatient therapy to get stronger.       Patient tolerated course of inpatient PT/OT/SLP rehab with significant functional improvements.     - She is setup for grooming and eating. She is CG or supervision for the rest of the ADL's.   - She is walking 75 feet with RW with CG or supervision.   - Regular + thin. Expressive aphasia. Mild dysarthria. Pending cognitive evaluation.   - goals: Ambulate to the bathroom with supervision and supervision with transfer.     Patient seen and examined on day of discharge.  Medications, medication side effects, and discharge instructions were reviewed with the patient, who expressed understanding of all information.  Patient was medically and functionally optimized and cleared for discharge.           This is a 77-year-old right-handed female patient with PMH  of HTN who presented to [facility]  on 1/11 with expressive aphasia since 1/8. Daughter found the patient in her bedroom. She was trying to tell her daughter that she had trouble speaking. She knew what she wanted to say, but couldn't find the words to express herself. The daughter reports she had a similar episode in the past and was found to have a UTI. She was treated with antibiotics and symptoms resolved. She took her to urgent care since she thought this was a UTI and they prescribed her antibiotics. Patient did not improve with antibiotics, which prompted them to come to the ED. Patient is noncompliant with blood pressure medication.  At baseline, patient occasionally requires assistance to ambulate, but refuses to use walker. She requires some assistance with ADLs. NIHSS 4, pre-mRS 2. MRI showed Left parietal acute infarct.    Patient started on ASA 81mg indefinitely and Plavix for 3 months per ALIN. Patient with a stable large hematoma on her RLQ abdomen for which chemical DVT prophylaxis was not started. Duplex of the lower extremities neg.  and pt started on atorvastatin 80mg daily (titrate to LDL <70). Cardiology consulted, -160s and started on amlodipine 5mg daily. ILR placed on 1/13/23 to monitor for arrhythmias.    Patient was evaluated by PM&R and therapy for functional deficits, gait/ADL impairments and acute rehabilitation was recommended. Patient was medically optimized for discharge to [facility] IRU on 1/17/2022.    Patient participated in daily therapies and made good functional gains.  Rehab course notable for high BP. Her medication was adjusted to 10mg of Norvasc daily. she tolerated it well without any complication. She also had some left shoulder pain, looked like impingement and she will need outpatient therapy to get stronger.     Patient tolerated course of inpatient PT/OT/SLP rehab with significant functional improvements.     - She is setup for grooming and eating. She is CG or supervision for the rest of the ADL's.   - She is walking 75 feet with RW with CG or supervision.   - Regular + thin. Expressive aphasia. Mild dysarthria. Pending cognitive evaluation.   - goals: Ambulate to the bathroom with supervision and supervision with transfer.     Patient seen and examined on day of discharge.  Medications, medication side effects, and discharge instructions were reviewed with the patient, who expressed understanding of all information.  Patient was medically and functionally optimized and cleared for discharge.

## 2023-01-30 NOTE — DISCHARGE NOTE PROVIDER - NSDCMRMEDTOKEN_GEN_ALL_CORE_FT
acetaminophen 325 mg oral tablet: 2 tab(s) orally every 6 hours, As needed, Temp greater or equal to 38C (100.4F), Mild Pain (1 - 3)  ondansetron 4 mg oral tablet, disintegratin tab(s) orally 3 times a day, As needed, Nausea and/or Vomiting  polyethylene glycol 3350 oral powder for reconstitution: 17 gram(s) orally once a day  senna leaf extract oral tablet: 2 tab(s) orally once a day (at bedtime)   acetaminophen 325 mg oral tablet: 2 tab(s) orally every 6 hours, As needed, Temp greater or equal to 38C (100.4F), Mild Pain (1 - 3)  amLODIPine 10 mg oral tablet: 1 tab(s) orally once a day  aspirin 81 mg oral delayed release tablet: 1 tab(s) orally once a day  atorvastatin 80 mg oral tablet: 1 tab(s) orally once a day (at bedtime)  clopidogrel 75 mg oral tablet: 1 tab(s) orally once a day  ondansetron 4 mg oral tablet, disintegratin tab(s) orally 3 times a day, As needed, Nausea and/or Vomiting  polyethylene glycol 3350 oral powder for reconstitution: 17 gram(s) orally once a day  senna leaf extract oral tablet: 2 tab(s) orally once a day (at bedtime)

## 2023-01-30 NOTE — PROGRESS NOTE ADULT - ASSESSMENT
77 year old PMH HTN presented to 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 on 1/11 after having expressive aphasia since 1/8,  found to have acute Left parietal infarct with L distal M2 occlusion. 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

## 2023-01-30 NOTE — PROGRESS NOTE ADULT - SUBJECTIVE AND OBJECTIVE BOX
Patient is a 77y old  Female who presents with a chief complaint of CVA (30 Jan 2023 10:28)      HPI:  This is a 77-year-old right-handed female patient with PMH  of HTN who presented to [place]  on 1/11 with expressive aphasia since 1/8. Daughter found the patient in her bedroom. She was trying to tell her daughter that she had trouble speaking. She knew what she wanted to say, but couldn't find the words to express herself. The daughter reports she had a similar episode in the past and was found to have a UTI. She was treated with antibiotics and symptoms resolved. She took her to urgent care since she thought this was a UTI and they prescribed her antibiotics. Patient did not improve with antibiotics, which prompted them to come to the ED. Patient is noncompliant with blood pressure medication.  At baseline, patient occasionally requires assistance to ambulate, but refuses to use walker. She requires some assistance with ADLs. NIHSS 4, pre-mRS 2. MRI showed Left parietal acute infarct.    Patient started on ASA 81mg indefinitely and Plavix for 3 months per ALIN. Patient with a stable large hematoma on her RLQ abdomen for which chemical DVT prophylaxis was not started. Duplex of the lower extremities neg.  and pt started on atorvastatin 80mg daily (titrate to LDL <70). Cardiology consulted, -160s and started on amlodipine 5mg daily. ILR placed on 1/13/23 to monitor for arrhythmias.    Patient was evaluated by PM&R and therapy for functional deficits, gait/ADL impairments and acute rehabilitation was recommended. Patient was medically optimized for discharge to [facility] IRU on 1/17/2022.   (17 Jan 2023 13:20)      INTERVAL HPI:  OVERNIGHT EVENTS:  T(F): 98.1 (01-30-23 @ 08:57), Max: 98.1 (01-29-23 @ 20:50)  HR: 71 (01-30-23 @ 08:57) (71 - 74)  BP: 129/72 (01-30-23 @ 08:57) (126/73 - 133/73)  RR: 17 (01-30-23 @ 08:57) (16 - 17)  SpO2: 95% (01-30-23 @ 08:57) (95% - 98%)  I&O's Summary      REVIEW OF SYSTEMS:  CONSTITUTIONAL: No fever, weight loss, or fatigue  EYES: No eye pain, visual disturbances, or discharge  ENMT:  No difficulty hearing, tinnitus, vertigo; No sinus or throat pain  NECK: No pain or stiffness  BREASTS: No pain, masses, or nipple discharge  RESPIRATORY: No cough, wheezing, chills or hemoptysis; No shortness of breath  CARDIOVASCULAR: No chest pain, palpitations, dizziness, or leg swelling  GASTROINTESTINAL: No abdominal or epigastric pain. No nausea, vomiting, or hematemesis; No diarrhea or constipation. No melena or hematochezia.  GENITOURINARY: No dysuria, frequency, hematuria, or incontinence  NEUROLOGICAL: No headaches, memory loss, loss of strength, numbness, or tremors  SKIN: No itching, burning, rashes, or lesions   LYMPH NODES: No enlarged glands  ENDOCRINE: No heat or cold intolerance; No hair loss  MUSCULOSKELETAL: No joint pain or swelling; No muscle, back, or extremity pain  PSYCHIATRIC: No depression, anxiety, mood swings, or difficulty sleeping  HEME/LYMPH: No easy bruising, or bleeding gums  ALLERY AND IMMUNOLOGIC: No hives or eczema    PHYSICAL EXAM:  GENERAL: NAD, well-groomed, well-developed  HEAD:  Atraumatic, Normocephalic  EYES: EOMI, PERRLA, conjunctiva and sclera clear  ENMT: No tonsillar erythema, exudates, or enlargement; Moist mucous membranes, Good dentition, No lesions  NECK: Supple, No JVD, Normal thyroid  NERVOUS SYSTEM:  Alert & Oriented X3, Good concentration; Motor Strength 5/5 B/L upper and lower extremities; DTRs 2+ intact and symmetric  CHEST/LUNG: Clear to percussion bilaterally; No rales, rhonchi, wheezing, or rubs  HEART: Regular rate and rhythm; No murmurs, rubs, or gallops  ABDOMEN: Soft, Nontender, Nondistended; Bowel sounds present  EXTREMITIES:  2+ Peripheral Pulses, No clubbing, cyanosis, or edema  LYMPH: No lymphadenopathy noted  SKIN: No rashes or lesions    LABS:                        13.6   5.01  )-----------( 291      ( 30 Jan 2023 06:40 )             42.0     01-30    139  |  103  |  16  ----------------------------<  113<H>  4.2   |  25  |  1.08    Ca    9.0      30 Jan 2023 06:40    TPro  7.5  /  Alb  3.3  /  TBili  0.4  /  DBili  x   /  AST  35  /  ALT  45  /  AlkPhos  96  01-30        CAPILLARY BLOOD GLUCOSE                  MEDICATIONS  (STANDING):  amLODIPine   Tablet 10 milliGRAM(s) Oral daily  aspirin enteric coated 81 milliGRAM(s) Oral daily  atorvastatin 80 milliGRAM(s) Oral at bedtime  clopidogrel Tablet 75 milliGRAM(s) Oral daily  enoxaparin Injectable 40 milliGRAM(s) SubCutaneous every 24 hours  polyethylene glycol 3350 17 Gram(s) Oral daily  senna 2 Tablet(s) Oral at bedtime    MEDICATIONS  (PRN):  acetaminophen     Tablet .. 650 milliGRAM(s) Oral every 6 hours PRN Temp greater or equal to 38C (100.4F), Mild Pain (1 - 3)  bisacodyl 5 milliGRAM(s) Oral every 12 hours PRN Constipation  ondansetron   Disintegrating Tablet 4 milliGRAM(s) Oral three times a day PRN Nausea and/or Vomiting       Patient is a 77y old  Female who presents with a chief complaint of CVA (30 Jan 2023 10:28)      HPI:  This is a 77-year-old right-handed female patient with PMH  of HTN who presented to [facility]  on 1/11 with expressive aphasia since 1/8. Daughter found the patient in her bedroom. She was trying to tell her daughter that she had trouble speaking. She knew what she wanted to say, but couldn't find the words to express herself. The daughter reports she had a similar episode in the past and was found to have a UTI. She was treated with antibiotics and symptoms resolved. She took her to urgent care since she thought this was a UTI and they prescribed her antibiotics. Patient did not improve with antibiotics, which prompted them to come to the ED. Patient is noncompliant with blood pressure medication.  At baseline, patient occasionally requires assistance to ambulate, but refuses to use walker. She requires some assistance with ADLs. NIHSS 4, pre-mRS 2. MRI showed Left parietal acute infarct.    Patient started on ASA 81mg indefinitely and Plavix for 3 months per ALIN. Patient with a stable large hematoma on her RLQ abdomen for which chemical DVT prophylaxis was not started. Duplex of the lower extremities neg.  and pt started on atorvastatin 80mg daily (titrate to LDL <70). Cardiology consulted, -160s and started on amlodipine 5mg daily. ILR placed on 1/13/23 to monitor for arrhythmias.    Patient was evaluated by PM&R and therapy for functional deficits, gait/ADL impairments and acute rehabilitation was recommended. Patient was medically optimized for discharge to [facility] IRU on 1/17/2022.   (17 Jan 2023 13:20)      INTERVAL HPI: Patient was seen and examined, No notable events overnight. Patient laying comfortably in bed with no complaints.  Pain controlled, no chest pain, no N/V, no Fevers/Chills. Tolerating rehabilitation well at this time.  OVERNIGHT EVENTS:  T(F): 98.1 (01-30-23 @ 08:57), Max: 98.1 (01-29-23 @ 20:50)  HR: 71 (01-30-23 @ 08:57) (71 - 74)  BP: 129/72 (01-30-23 @ 08:57) (126/73 - 133/73)  RR: 17 (01-30-23 @ 08:57) (16 - 17)  SpO2: 95% (01-30-23 @ 08:57) (95% - 98%)  I&O's Summary      REVIEW OF SYSTEMS:  CONSTITUTIONAL: No fever, weight loss, or fatigue  EYES: No eye pain, visual disturbances, or discharge  ENMT:  No difficulty hearing, tinnitus, vertigo; No sinus or throat pain  NECK: No pain or stiffness  RESPIRATORY: No cough, wheezing, chills or hemoptysis; No shortness of breath  CARDIOVASCULAR: No chest pain, palpitations, dizziness, or leg swelling  GASTROINTESTINAL: No abdominal or epigastric pain. No nausea, vomiting, or hematemesis; No diarrhea or constipation. No melena or hematochezia.  GENITOURINARY: No dysuria, frequency, hematuria, or incontinence  NEUROLOGICAL: No headaches, memory loss, loss of strength, numbness, or tremors  SKIN: No itching, burning, rashes, or lesions   MUSCULOSKELETAL: No joint pain or swelling; No muscle, back, or extremity pain  PSYCHIATRIC: No depression, anxiety, mood swings, or difficulty sleeping      PHYSICAL EXAM:  GENERAL: NAD, well-groomed, well-developed  HEAD:  Atraumatic, Normocephalic  EYES: EOMI, PERRLA, conjunctiva and sclera clear  ENMT: No tonsillar erythema, exudates, or enlargement; Moist mucous membranes, Good dentition, No lesions  NECK: Supple, No JVD  NERVOUS SYSTEM:  Alert & Oriented X3, Good concentration; Motor Strength 5/5 B/L upper and lower extremities; DTRs 2+ intact and symmetric  CHEST/LUNG: Clear to percussion bilaterally; No rales, rhonchi, wheezing, or rubs  HEART: Regular rate and rhythm; No murmurs, rubs, or gallops  ABDOMEN: Soft, Nontender, Nondistended; Bowel sounds present  EXTREMITIES:  2+ Peripheral Pulses, No clubbing, cyanosis, or edema        LABS:                        13.6   5.01  )-----------( 291      ( 30 Jan 2023 06:40 )             42.0     01-30    139  |  103  |  16  ----------------------------<  113<H>  4.2   |  25  |  1.08    Ca    9.0      30 Jan 2023 06:40    TPro  7.5  /  Alb  3.3  /  TBili  0.4  /  DBili  x   /  AST  35  /  ALT  45  /  AlkPhos  96  01-30        CAPILLARY BLOOD GLUCOSE                  MEDICATIONS  (STANDING):  amLODIPine   Tablet 10 milliGRAM(s) Oral daily  aspirin enteric coated 81 milliGRAM(s) Oral daily  atorvastatin 80 milliGRAM(s) Oral at bedtime  clopidogrel Tablet 75 milliGRAM(s) Oral daily  enoxaparin Injectable 40 milliGRAM(s) SubCutaneous every 24 hours  polyethylene glycol 3350 17 Gram(s) Oral daily  senna 2 Tablet(s) Oral at bedtime    MEDICATIONS  (PRN):  acetaminophen     Tablet .. 650 milliGRAM(s) Oral every 6 hours PRN Temp greater or equal to 38C (100.4F), Mild Pain (1 - 3)  bisacodyl 5 milliGRAM(s) Oral every 12 hours PRN Constipation  ondansetron   Disintegrating Tablet 4 milliGRAM(s) Oral three times a day PRN Nausea and/or Vomiting       Patient is a 77y old  Female who presents with a chief complaint of CVA (30 Jan 2023 10:28)      HPI:  This is a 77-year-old right-handed female patient with PMH  of HTN who presented to [place]  on 1/11 with expressive aphasia since 1/8. Daughter found the patient in her bedroom. She was trying to tell her daughter that she had trouble speaking. She knew what she wanted to say, but couldn't find the words to express herself. The daughter reports she had a similar episode in the past and was found to have a UTI. She was treated with antibiotics and symptoms resolved. She took her to urgent care since she thought this was a UTI and they prescribed her antibiotics. Patient did not improve with antibiotics, which prompted them to come to the ED. Patient is noncompliant with blood pressure medication.  At baseline, patient occasionally requires assistance to ambulate, but refuses to use walker. She requires some assistance with ADLs. NIHSS 4, pre-mRS 2. MRI showed Left parietal acute infarct.    Patient started on ASA 81mg indefinitely and Plavix for 3 months per ALIN. Patient with a stable large hematoma on her RLQ abdomen for which chemical DVT prophylaxis was not started. Duplex of the lower extremities neg.  and pt started on atorvastatin 80mg daily (titrate to LDL <70). Cardiology consulted, -160s and started on amlodipine 5mg daily. ILR placed on 1/13/23 to monitor for arrhythmias.    Patient was evaluated by PM&R and therapy for functional deficits, gait/ADL impairments and acute rehabilitation was recommended. Patient was medically optimized for discharge to [facility] IRU on 1/17/2022.   (17 Jan 2023 13:20)      INTERVAL HPI: Patient was seen and examined, No notable events overnight. Patient laying comfortably in bed with no complaints.  Pain controlled, no chest pain, no N/V, no Fevers/Chills. Tolerating rehabilitation well at this time.  OVERNIGHT EVENTS:  T(F): 98.1 (01-30-23 @ 08:57), Max: 98.1 (01-29-23 @ 20:50)  HR: 71 (01-30-23 @ 08:57) (71 - 74)  BP: 129/72 (01-30-23 @ 08:57) (126/73 - 133/73)  RR: 17 (01-30-23 @ 08:57) (16 - 17)  SpO2: 95% (01-30-23 @ 08:57) (95% - 98%)  I&O's Summary      REVIEW OF SYSTEMS:  CONSTITUTIONAL: No fever, weight loss, or fatigue  EYES: No eye pain, visual disturbances, or discharge  ENMT:  No difficulty hearing, tinnitus, vertigo; No sinus or throat pain  NECK: No pain or stiffness  RESPIRATORY: No cough, wheezing, chills or hemoptysis; No shortness of breath  CARDIOVASCULAR: No chest pain, palpitations, dizziness, or leg swelling  GASTROINTESTINAL: No abdominal or epigastric pain. No nausea, vomiting, or hematemesis; No diarrhea or constipation. No melena or hematochezia.  GENITOURINARY: No dysuria, frequency, hematuria, or incontinence  NEUROLOGICAL: No headaches, memory loss, loss of strength, numbness, or tremors  SKIN: No itching, burning, rashes, or lesions   MUSCULOSKELETAL: No joint pain or swelling; No muscle, back, or extremity pain  PSYCHIATRIC: No depression, anxiety, mood swings, or difficulty sleeping      PHYSICAL EXAM:  GENERAL: NAD, well-groomed, well-developed  HEAD:  Atraumatic, Normocephalic  EYES: EOMI, PERRLA, conjunctiva and sclera clear  ENMT: No tonsillar erythema, exudates, or enlargement; Moist mucous membranes, Good dentition, No lesions  NECK: Supple, No JVD  NERVOUS SYSTEM:  Alert & Oriented X3, Good strength lower b/l  leg weakness noted  CHEST/LUNG: Clear to percussion bilaterally; No rales, rhonchi, wheezing, or rubs  HEART: Regular rate and rhythm; No murmurs, rubs, or gallops  ABDOMEN: Soft, Nontender, Nondistended; Bowel sounds present  EXTREMITIES:  2+ Peripheral Pulses, hematoma RLE        LABS:                        13.6   5.01  )-----------( 291      ( 30 Jan 2023 06:40 )             42.0     01-30    139  |  103  |  16  ----------------------------<  113<H>  4.2   |  25  |  1.08    Ca    9.0      30 Jan 2023 06:40    TPro  7.5  /  Alb  3.3  /  TBili  0.4  /  DBili  x   /  AST  35  /  ALT  45  /  AlkPhos  96  01-30        CAPILLARY BLOOD GLUCOSE                  MEDICATIONS  (STANDING):  amLODIPine   Tablet 10 milliGRAM(s) Oral daily  aspirin enteric coated 81 milliGRAM(s) Oral daily  atorvastatin 80 milliGRAM(s) Oral at bedtime  clopidogrel Tablet 75 milliGRAM(s) Oral daily  enoxaparin Injectable 40 milliGRAM(s) SubCutaneous every 24 hours  polyethylene glycol 3350 17 Gram(s) Oral daily  senna 2 Tablet(s) Oral at bedtime    MEDICATIONS  (PRN):  acetaminophen     Tablet .. 650 milliGRAM(s) Oral every 6 hours PRN Temp greater or equal to 38C (100.4F), Mild Pain (1 - 3)  bisacodyl 5 milliGRAM(s) Oral every 12 hours PRN Constipation  ondansetron   Disintegrating Tablet 4 milliGRAM(s) Oral three times a day PRN Nausea and/or Vomiting

## 2023-01-30 NOTE — DISCHARGE NOTE PROVIDER - CARE PROVIDER_API CALL
Rock Reyes)  PhysicalRehab Medicine  [address] [place] [address]  Phone: [phone]  Fax: [phone]  Follow Up Time: 1 month

## 2023-01-30 NOTE — DISCHARGE NOTE PROVIDER - NSDCFUADDAPPT_GEN_ALL_CORE_FT
Reza Kemp (DO)  Neurology; Vascular Neurology  [address], [address]  [place] [address]  Phone: [phone]  Fax: [phone]  Follow Up Time: 1 month    Michael Kaur (DO)  Cardiology; Internal Medicine  [address], [address]  [place] [address]  Phone: [phone]  Fax: [phone]  Follow Up Time: 1 month  DASH Diet  No restrictions

## 2023-01-30 NOTE — PROGRESS NOTE ADULT - SUBJECTIVE AND OBJECTIVE BOX
Patient participated alone in a 35-minute, follow-up, supportive psychotherapy session at bedside. She is aware that discharge to home is pending for tomorrow and her family will provide her with assistance and support, as needed. She is learning how to work around residual word-finding difficulties from CVA.

## 2023-01-30 NOTE — DISCHARGE NOTE PROVIDER - NSDCCPCAREPLAN_GEN_ALL_CORE_FT
PRINCIPAL DISCHARGE DIAGNOSIS  Diagnosis: CVA (cerebrovascular accident)  Assessment and Plan of Treatment: You were admitted to [facility] after having a stroke. After being discharged, please have close follow up with your PCP, Neurologist, and rehab doctors. Continue with all medications as prescribed.   Please continue to take the plavix for total of 3 months after your intial admission to the hospital. Please see the neurlogist prior to stopping.      SECONDARY DISCHARGE DIAGNOSES  Diagnosis: HTN (hypertension)  Assessment and Plan of Treatment: We adjusted some of your Blood pressure medication. Please continue to take as indiciated. follow up with your PCP for further adjustments as needed.

## 2023-01-30 NOTE — PROGRESS NOTE ADULT - ASSESSMENT
ASSESSMENT/PLAN  This is a 78 YO  woman with PMH  of HTN who presented to [facility]  on 1/11 with expressive aphasia since 1/8. MRI showed Left parietal acute infarct. She was also found to have stable large hematoma on her RLQ abdomen. Patient now with gait Instability, ADL impairments and Functional impairments.    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  - aphasia  - dysarthia  - dysphagia  - Continue Comprehensive Rehab Program: PT/OT/ST, 3hours daily and 5 days weekly  - PT: Focused on improving strength, endurance, coordination, balance, functional mobility, and transfers  - OT: Focused on improving strength, fine motor skills, coordination, posture and ADLs.    - ST: to diagnose and treat deficits in swallowing, cognition and communication.   - c/w aspirin, plavix and high dose statin  - ILR placed on 1/13/23   - Psychology is following for adjustment with the new CVA.  - Added recreational therapy.     #HTN  - Amlodipine increased to 10mg daily.   - BP is stable.     #HLD  - Lipitor 80mg daily    # CKD  - monitor kidney function, BMP, electrolytes  - WNL this am ( 1/26)      #Hematoma  - stable hematoma on her RLQ abdomen and the Left Breast.   - Continue to monitor    #Pain management  - Tylenol PRN    #DVT ppx  - Lovenox   - last dopplers: Negative for DVT on 1/15    #Bowel Regimen  - Senna, miralax     #Bladder management  - BS on admission, and q 8 hours (SC if > 400)  - Monitor UO    #FEN   - Diet: DASH     #Skin:  - Skin on admission: Large hematoma on her RLQ abdomen and the Left Breast.   - Pressure injury/Skin: Turn Q2hrs while in bed, OOB to Chair, PT/OT     #Dysphagia    - SLP: evaluation and treatment    #Precaution  - Fall, Aspiration    #GOC  CODE STATUS: FULL CODE       IDTR     # Case discussed in IDT rounds 1/26   - She lives in apartment with 2 steps and has support from 2 daughter.   - She is setup for grooming and eating. She is CG or supervision for the rest of the ADL's.   - She is walking 75 feet with RW with CG or supervision.   - Regular + thin. Expressive aphasia. Mild dysarthria. Pending cognitive evaluation.   - goals: Ambulate to the bathroom with supervision and supervision with transfer.   - target dc home 1/31.   - caregiver training

## 2023-01-31 ENCOUNTER — TRANSCRIPTION ENCOUNTER (OUTPATIENT)
Age: 78
End: 2023-01-31

## 2023-01-31 VITALS
RESPIRATION RATE: 15 BRPM | SYSTOLIC BLOOD PRESSURE: 147 MMHG | OXYGEN SATURATION: 95 % | HEART RATE: 86 BPM | DIASTOLIC BLOOD PRESSURE: 80 MMHG

## 2023-01-31 PROCEDURE — 99238 HOSP IP/OBS DSCHRG MGMT 30/<: CPT

## 2023-01-31 RX ADMIN — CLOPIDOGREL BISULFATE 75 MILLIGRAM(S): 75 TABLET, FILM COATED ORAL at 11:36

## 2023-01-31 RX ADMIN — AMLODIPINE BESYLATE 10 MILLIGRAM(S): 2.5 TABLET ORAL at 05:28

## 2023-01-31 RX ADMIN — Medication 81 MILLIGRAM(S): at 11:37

## 2023-01-31 NOTE — PROGRESS NOTE ADULT - PROVIDER SPECIALTY LIST ADULT
Hospitalist
Rehab Medicine
Hospitalist
Neuropsychology
Rehab Medicine
Hospitalist
Rehab Medicine
Physiatry
Rehab Medicine
Rehab Medicine

## 2023-01-31 NOTE — DISCHARGE NOTE NURSING/CASE MANAGEMENT/SOCIAL WORK - PATIENT PORTAL LINK FT
You can access the FollowMyHealth Patient Portal offered by [facility] by registering at the following website: [email or web address]. By joining [facility]’s FollowMyHealth portal, you will also be able to view your health information using other applications (apps) compatible with our system.

## 2023-01-31 NOTE — PROGRESS NOTE ADULT - ASSESSMENT
ASSESSMENT/PLAN  This is a 76 YO  woman with PMH  of HTN who presented to [place]  on 1/11 with expressive aphasia since 1/8. MRI showed Left parietal acute infarct. She was also found to have stable large hematoma on her RLQ abdomen. Patient now with gait Instability, ADL impairments and Functional impairments.    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  - Continue Comprehensive Rehab Program: PT/OT/ST, 3hours daily and 5 days weekly  - PT: Focused on improving strength, endurance, coordination, balance, functional mobility, and transfers  - OT: Focused on improving strength, fine motor skills, coordination, posture and ADLs.    - ST: to diagnose and treat deficits in swallowing, cognition and communication.   - c/w aspirin, plavix and high dose statin  - ILR placed on 1/13/23   - Follow up Neurology OP.     #HTN  - Amlodipine increased to 10mg daily.   - BP is stable.     #HLD  - Lipitor 80mg daily    # CKD  - monitor kidney function, BMP, electrolytes     #Hematoma  - stable hematoma on her RLQ abdomen and the Left Breast.     #Pain management  - Tylenol PRN    #DVT ppx  - Lovenox   - last dopplers: Negative for DVT on 1/15    #Bowel Regimen  - Senna, miralax     #FEN   - Diet: DASH     #Skin:  - Skin on admission: Large hematoma on her RLQ abdomen and the Left Breast.   - Pressure injury/Skin: Turn Q2hrs while in bed, OOB to Chair, PT/OT     #Dysphagia    - SLP: evaluation and treatment    #Precaution  - Fall, Aspiration    #GOC  CODE STATUS: FULL CODE       IDTR     # Case discussed in IDT rounds 1/26   - She lives in apartment with 2 steps and has support from 2 daughter.   - She is setup for grooming and eating. She is CG or supervision for the rest of the ADL's.   - She is walking 75 feet with RW with CG or supervision.   - Regular + thin. Expressive aphasia. Mild dysarthria. Pending cognitive evaluation.   - goals: Ambulate to the bathroom with supervision and supervision with transfer.   - target dc home 1/31.   - caregiver training     ASSESSMENT/PLAN  This is a 78 YO  woman with PMH  of HTN who presented to [facility]  on 1/11 with expressive aphasia since 1/8. MRI showed Left parietal acute infarct. She was also found to have stable large hematoma on her RLQ abdomen. Patient now with gait Instability, ADL impairments and Functional impairments.    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  - Continue Comprehensive Rehab Program: PT/OT/ST, 3hours daily and 5 days weekly  - c/w aspirin, plavix and high dose statin  - ILR placed on 1/13/23   - Follow up Neurology OP.   - Discharge home today    #HTN  - Amlodipine increased to 10mg daily.   - BP is stable.     #HLD  - Lipitor 80mg daily    # CKD  - monitor kidney function, BMP, electrolytes     #Hematoma  - stable hematoma on her RLQ abdomen and the Left Breast.     #Pain management  - Tylenol PRN    #DVT ppx  - Lovenox   - last dopplers: Negative for DVT on 1/15    #Bowel Regimen  - Senna, miralax     #FEN   - Diet: DASH     #Skin:  - Skin on admission: Large hematoma on her RLQ abdomen and the Left Breast.   - Pressure injury/Skin: Turn Q2hrs while in bed, OOB to Chair, PT/OT     #Dysphagia    - SLP: evaluation and treatment    #Precaution  - Fall, Aspiration    #[facility]  CODE STATUS: FULL CODE       IDTR     # Case discussed in IDT rounds 1/26   - She lives in apartment with 2 steps and has support from 2 daughter.   - She is setup for grooming and eating. She is CG or supervision for the rest of the ADL's.   - She is walking 75 feet with RW with CG or supervision.   - Regular + thin. Expressive aphasia. Mild dysarthria. Pending cognitive evaluation.   - goals: Ambulate to the bathroom with supervision and supervision with transfer.   - target dc home 1/31.   - caregiver training

## 2023-01-31 NOTE — DISCHARGE NOTE NURSING/CASE MANAGEMENT/SOCIAL WORK - NSDCPEFALRISK_GEN_ALL_CORE
For information on Fall & Injury Prevention, visit: [email or web address] OR  [email or web address] OR  https://www.cdc.gov/steadi/patient.html

## 2023-01-31 NOTE — DISCHARGE NOTE NURSING/CASE MANAGEMENT/SOCIAL WORK - NSDCFUADDAPPT_GEN_ALL_CORE_FT
Reza Kemp (DO)  Neurology; Vascular Neurology  [address], [address]  [place] [address]  Phone: [phone]  Fax: [phone]  Follow Up Time: 1 month    Michael aKur (DO)  Cardiology; Internal Medicine  [address], [address]  [place] [address]  Phone: [phone]  Fax: [phone]  Follow Up Time: 1 month  DASH Diet  No restrictions

## 2023-01-31 NOTE — PROGRESS NOTE ADULT - SUBJECTIVE AND OBJECTIVE BOX
HPI  This is a 77-year-old right-handed female patient with PMH  of HTN who presented to [facility]  on 1/11 with expressive aphasia since 1/8. Daughter found the patient in her bedroom. She was trying to tell her daughter that she had trouble speaking. She knew what she wanted to say, but couldn't find the words to express herself. The daughter reports she had a similar episode in the past and was found to have a UTI. She was treated with antibiotics and symptoms resolved. She took her to urgent care since she thought this was a UTI and they prescribed her antibiotics. Patient did not improve with antibiotics, which prompted them to come to the ED. Patient is noncompliant with blood pressure medication.  At baseline, patient occasionally requires assistance to ambulate, but refuses to use walker. She requires some assistance with ADLs. NIHSS 4, pre-mRS 2. MRI showed Left parietal acute infarct.    Patient started on ASA 81mg indefinitely and Plavix for 3 months per ALIN. Patient with a stable large hematoma on her RLQ abdomen for which chemical DVT prophylaxis was not started. Duplex of the lower extremities neg.  and pt started on atorvastatin 80mg daily (titrate to LDL <70). Cardiology consulted, -160s and started on amlodipine 5mg daily. ILR placed on 1/13/23 to monitor for arrhythmias.    Patient was evaluated by PM&R and therapy for functional deficits, gait/ADL impairments and acute rehabilitation was recommended. Patient was medically optimized for discharge to [facility] on 1/17/2022.    TDD - 1/31 Home  ___________________________________________________________________________________________    SUBJECTIVE  Patient seen and examined at bedside  No events overnight.   Denies any pain at this time.   Denies any-other complaints at this time.   Participating in therapy   Blood pressure in better controlled   She is going home today. Sent the meds and answered any question regarding to the discharge.     ___________________________________________________________________________________________      PSYCHOLOGY -Plan: Supportive psychotherapy sessions are recommended to address mood and assist with adjustment to CVA. Patient is in agreement with plan. Patient may also benefit from recreation/creative arts therapy to increase positive social interactions and to promote sense of task mastery and completion. Neuropsychology remains available.      ___________________________________________________________________________________________    T(C): 36.8 (01-31-23 @ 07:45)  T(F): 98.3 (01-31-23 @ 07:45), Max: 98.7 (01-30-23 @ 20:38)  HR: 76 (01-31-23 @ 07:45) (72 - 78)  BP: 134/73 (01-31-23 @ 07:45) (128/63 - 134/73)  RR:  (15 - 16)  SpO2:  (96% - 96%)  Wt(kg): --    LABS                                    13.6   5.01  )-----------( 291      ( 30 Jan 2023 06:40 )             42.0     01-30    139  |  103  |  16  ----------------------------<  113<H>  4.2   |  25  |  1.08    Ca    9.0      30 Jan 2023 06:40    TPro  7.5  /  Alb  3.3  /  TBili  0.4  /  DBili  x   /  AST  35  /  ALT  45  /  AlkPhos  96  01-30      ___________________________________________________________________________________________    MEDICATIONS  (STANDING):  amLODIPine   Tablet 10 milliGRAM(s) Oral daily  aspirin enteric coated 81 milliGRAM(s) Oral daily  atorvastatin 80 milliGRAM(s) Oral at bedtime  clopidogrel Tablet 75 milliGRAM(s) Oral daily  enoxaparin Injectable 40 milliGRAM(s) SubCutaneous every 24 hours  polyethylene glycol 3350 17 Gram(s) Oral daily  senna 2 Tablet(s) Oral at bedtime    MEDICATIONS  (PRN):  acetaminophen     Tablet .. 650 milliGRAM(s) Oral every 6 hours PRN Temp greater or equal to 38C (100.4F), Mild Pain (1 - 3)  bisacodyl 5 milliGRAM(s) Oral every 12 hours PRN Constipation  ondansetron   Disintegrating Tablet 4 milliGRAM(s) Oral three times a day PRN Nausea and/or Vomiting        ___________________________________________________________________________________________    Physical Exam:   Constitutional - NAD, Comfortable  HEENT - NCAT, EOMI  Chest - good chest expansion, good respiratory effort, CTAB + loop recorder site c/d/i.   Cardio - warm and well perfused.  Abdomen -  Soft, NTND  Extremities - No peripheral edema, No calf tenderness. + tenderness in the L shoulder. Likely due to impingement. Good ROM overall.   Neurologic Exam:                    Cognitive -             Orientation: Awake, Alert, AAO to self, place, date, year, situation            Attention:  Able to repeat. Able to recall 1/3 objects after 5 mins. Unable to do serial 7's.             Memory: Recent  memory intact            Thought: process, content appropriate     Speech - Expressive aphasia, Comprehensible, Mild dysarthria, able to repeat.      Cranial Nerves - No facial asymmetry, Tongue midline, Shoulder shrug intact     Motor -                      LEFT    UE - ShAB 5/5, EF 5/5, EE 5/5, WE 5/5,  WNL                    RIGHT UE - ShAB 5/5, EF 5/5, EE 5/5, WE 5/5,  WNL                    LEFT    LE - HF 4/5, KE 5/5, DF 5/5, PF 5/5                    RIGHT LE - HF 4/5, KE 5/5, DF 5/5, PF 5/5        Sensory - Intact to LT bilateral  Psychiatric - Mood stable, Affect WNL  Skin: Hematoma noted on the RLQ and at left breast medial to the areola improving    REVIEW OF SYSTEMS  Constitutional: No fever, No Chills, No fatigue  Pulm: No cough,  No shortness of breath  Cardio: No chest pain, No palpitations  GI:  No abdominal pain, No nausea, No vomiting, No diarrhea, No constipation  : No dysuria, No frequency, No hematuria  Neuro: No headaches, No memory loss, + loss of strength, No numbness, No tremors  Skin: No itching, No rashes, No lesions   MSK: No joint pain, No joint swelling, + muscle pain, No Neck or back pain

## 2023-01-31 NOTE — PROGRESS NOTE ADULT - REASON FOR ADMISSION
CVA
s/p CVA
CVA

## 2023-02-27 ENCOUNTER — APPOINTMENT (OUTPATIENT)
Dept: INTERNAL MEDICINE | Facility: CLINIC | Age: 78
End: 2023-02-27
Payer: MEDICARE

## 2023-02-27 VITALS
DIASTOLIC BLOOD PRESSURE: 70 MMHG | HEIGHT: 61 IN | WEIGHT: 293 LBS | SYSTOLIC BLOOD PRESSURE: 130 MMHG | OXYGEN SATURATION: 98 % | HEART RATE: 95 BPM | TEMPERATURE: 98 F | BODY MASS INDEX: 55.32 KG/M2

## 2023-02-27 DIAGNOSIS — Z80.3 FAMILY HISTORY OF MALIGNANT NEOPLASM OF BREAST: ICD-10-CM

## 2023-02-27 DIAGNOSIS — Z78.9 OTHER SPECIFIED HEALTH STATUS: ICD-10-CM

## 2023-02-27 PROCEDURE — 99205 OFFICE O/P NEW HI 60 MIN: CPT

## 2023-02-27 NOTE — HEALTH RISK ASSESSMENT
[No] : No [One fall no injury in past year] : Patient reported one fall in the past year without injury [1] : 1) Little interest or pleasure doing things for several days (1) [0] : 2) Feeling down, depressed, or hopeless: Not at all (0) [PHQ-2 Negative - No further assessment needed] : PHQ-2 Negative - No further assessment needed [I have developed a follow-up plan documented below in the note.] : I have developed a follow-up plan documented below in the note. [Never] : Never [de-identified] : 4/2022 OF HER BED [[id number]] : 1

## 2023-02-27 NOTE — ASSESSMENT
[FreeTextEntry1] : CVA\par -CARDIOLOGY 2/28/2023\par -JOHANNA NEUO, REHAB 3/20/2023 \par weight loss counseling provided as above \par Depression screening WITH the Patient Health Questionnaire-2 (PHQ-2), THE RESULT WAS NEGATIVE.\par DISCUSSED PRECAUTIONS AGAINST COVID19, INCLUDING MASK WEARING, SOCIAL DISTANCING AND HAND WASHING.\par -Follow up in 1 MONTH for CPE / annual exam or PRN.\par 61 MINUTES SPENT BY THE PROVIDER, INCLUSIVE OF ALL ISSUES RELATED TO THIS ENCOUNTER ON THE DATE OF SERVICE. \par All questions and concerns were discussed. \par

## 2023-02-27 NOTE — COUNSELING
[Fall prevention counseling provided] : Fall prevention counseling provided [Adequate lighting] : Adequate lighting [No throw rugs] : No throw rugs [Use proper foot wear] : Use proper foot wear [Use recommended devices] : Use recommended devices [Potential consequences of obesity discussed] : Potential consequences of obesity discussed [Benefits of weight loss discussed] : Benefits of weight loss discussed [Structured Weight Management Program suggested:] : Structured weight management program suggested [Encouraged to maintain food diary] : Encouraged to maintain food diary [Encouraged to increase physical activity] : Encouraged to increase physical activity [Encouraged to use exercise tracking device] : Encouraged to use exercise tracking device [Target Wt Loss Goal ___] : Weight Loss Goals: Target weight loss goal [unfilled] lbs [Weigh Self Weekly] : weigh self weekly [Decrease Portions] : decrease portions [____ min/wk Activity] : [unfilled] min/wk activity [Keep Food Diary] : keep food diary [Good understanding] : Patient has a good understanding of disease, goals and obesity follow-up plan [FreeTextEntry4] : 16

## 2023-03-07 PROCEDURE — 92523 SPEECH SOUND LANG COMPREHEN: CPT

## 2023-03-07 PROCEDURE — 92507 TX SP LANG VOICE COMM INDIV: CPT

## 2023-03-07 PROCEDURE — 97163 PT EVAL HIGH COMPLEX 45 MIN: CPT

## 2023-03-07 PROCEDURE — 97110 THERAPEUTIC EXERCISES: CPT

## 2023-03-07 PROCEDURE — 97167 OT EVAL HIGH COMPLEX 60 MIN: CPT

## 2023-03-07 PROCEDURE — 80053 COMPREHEN METABOLIC PANEL: CPT

## 2023-03-07 PROCEDURE — 97130 THER IVNTJ EA ADDL 15 MIN: CPT

## 2023-03-07 PROCEDURE — 81001 URINALYSIS AUTO W/SCOPE: CPT

## 2023-03-07 PROCEDURE — 97112 NEUROMUSCULAR REEDUCATION: CPT

## 2023-03-07 PROCEDURE — 97530 THERAPEUTIC ACTIVITIES: CPT

## 2023-03-07 PROCEDURE — 36415 COLL VENOUS BLD VENIPUNCTURE: CPT

## 2023-03-07 PROCEDURE — 87635 SARS-COV-2 COVID-19 AMP PRB: CPT

## 2023-03-07 PROCEDURE — 97116 GAIT TRAINING THERAPY: CPT

## 2023-03-07 PROCEDURE — 92611 MOTION FLUOROSCOPY/SWALLOW: CPT

## 2023-03-07 PROCEDURE — 85025 COMPLETE CBC W/AUTO DIFF WBC: CPT

## 2023-03-07 PROCEDURE — 97129 THER IVNTJ 1ST 15 MIN: CPT

## 2023-03-07 PROCEDURE — 92610 EVALUATE SWALLOWING FUNCTION: CPT

## 2023-03-07 PROCEDURE — 85027 COMPLETE CBC AUTOMATED: CPT

## 2023-03-07 PROCEDURE — 97535 SELF CARE MNGMENT TRAINING: CPT

## 2023-03-07 PROCEDURE — 99366 TEAM CONF W/PAT BY HC PROF: CPT

## 2023-03-20 ENCOUNTER — APPOINTMENT (OUTPATIENT)
Dept: PHYSICAL MEDICINE AND REHAB | Facility: CLINIC | Age: 78
End: 2023-03-20
Payer: MEDICARE

## 2023-03-20 ENCOUNTER — NON-APPOINTMENT (OUTPATIENT)
Age: 78
End: 2023-03-20

## 2023-03-20 VITALS
OXYGEN SATURATION: 98 % | SYSTOLIC BLOOD PRESSURE: 162 MMHG | WEIGHT: 148 LBS | BODY MASS INDEX: 27.94 KG/M2 | HEIGHT: 61 IN | HEART RATE: 77 BPM | DIASTOLIC BLOOD PRESSURE: 87 MMHG | TEMPERATURE: 98.1 F

## 2023-03-20 DIAGNOSIS — Z86.73 PERSONAL HISTORY OF TRANSIENT ISCHEMIC ATTACK (TIA), AND CEREBRAL INFARCTION W/OUT RESIDUAL DEFICITS: ICD-10-CM

## 2023-03-20 PROCEDURE — 99203 OFFICE O/P NEW LOW 30 MIN: CPT

## 2023-03-20 NOTE — REVIEW OF SYSTEMS
[Muscle Pain] : muscle pain [Difficulty Walking] : difficulty walking [Negative] : Psychiatric [FreeTextEntry9] : Left shoulder/upper back pain

## 2023-03-20 NOTE — SOCIAL HISTORY
[Apartment] : in an apartment [Stairs to enter?] : stairs to enter [] : wheelchair accessibility [Cane] : ~T cane [Walker] : walker [Manual Wheelchair] : manual wheelchair [de-identified] : Two grandchildren [FreeTextEntry1] : Two steps to enter

## 2023-03-20 NOTE — PHYSICAL EXAM
[FreeTextEntry1] : Constitutional - NAD, Comfortable\par HEENT - NCAT, EOMI\par Chest - good chest expansion, good respiratory effort, CTAB\par Cardio - warm and well perfused.\par Abdomen -  Soft, NTND\par Extremities - No peripheral edema, No calf tenderness. + tenderness in the left upper trapezius\par Neurologic Exam:               \par    Cognitive - \par           Orientation: Awake, Alert, AAO to self, place, date, year, situation\par           Attention:  Able to repeat. Able to recall 1/3 objects after 5 mins. Unable to do serial 7's. \par           Memory: Recent  memory intact\par           Thought: process, content appropriate\par    Speech - Expressive aphasia, Comprehensible, Mild dysarthria, able to repeat. \par    Cranial Nerves - No facial asymmetry, Tongue midline, Shoulder shrug intact\par    Motor -  \par                   LEFT    UE - ShAB 5/5, EF 5/5, EE 5/5, WE 5/5,  WNL\par                   RIGHT UE - ShAB 5/5, EF 5/5, EE 5/5, WE 5/5,  WNL\par                   LEFT    LE - HF 4/5, KE 5/5, DF 5/5, PF 5/5\par                   RIGHT LE - HF 5/5, KE 5/5, DF 5/5, PF 5/5   \par    Sensory - Intact to LT bilateral\par Psychiatric - Mood stable, Affect WNL\par

## 2023-03-20 NOTE — HISTORY OF PRESENT ILLNESS
[FreeTextEntry1] : This is a 77-year-old right-handed female patient with PMH  of HTN who presented to [facility]  on 1/11 with expressive aphasia since 1/8. Daughter found the patient in her bedroom. She was trying to tell her daughter that she had trouble speaking. She knew what she wanted to say, but couldn't find the words to express herself. The daughter reports she had a similar episode in the past and was found to have a UTI. She was treated with antibiotics and symptoms resolved. She took her to urgent care since she thought this was a UTI and they prescribed her antibiotics. Patient did not improve with antibiotics, which prompted them to come to the ED. Patient is noncompliant with blood pressure medication.  At baseline, patient occasionally requires assistance to ambulate, but refuses to use walker. She requires some assistance with ADLs. NIHSS 4, pre-mRS 2. MRI showed Left parietal acute infarct.\par \par Patient started on ASA 81mg indefinitely and Plavix for 3 months per ALIN. Patient with a stable large hematoma on her RLQ abdomen for which chemical DVT prophylaxis was not started. Duplex of the lower extremities neg.  and pt started on atorvastatin 80mg daily (titrate to LDL <70). Cardiology consulted, -160s and started on amlodipine 5mg daily. ILR placed on 1/13/23 to monitor for arrhythmias.\par \par Patient was evaluated by PM&R and therapy for functional deficits, gait/ADL impairments and acute rehabilitation was recommended. She was admitted and subsequently discharged on 1/31/23 with functional improvements. She additionally complained on left shoulder pain. She has been participating on a physical therapy program at home with good response. More recently, she was re-evaluated by Neurologist and recommended continued participation in Physical Therapy..\par

## 2023-04-03 ENCOUNTER — APPOINTMENT (OUTPATIENT)
Dept: INTERNAL MEDICINE | Facility: CLINIC | Age: 78
End: 2023-04-03

## 2023-05-08 ENCOUNTER — APPOINTMENT (OUTPATIENT)
Dept: PHYSICAL MEDICINE AND REHAB | Facility: CLINIC | Age: 78
End: 2023-05-08
Payer: MEDICARE

## 2023-05-08 VITALS
HEIGHT: 61 IN | BODY MASS INDEX: 28.32 KG/M2 | DIASTOLIC BLOOD PRESSURE: 80 MMHG | WEIGHT: 150 LBS | SYSTOLIC BLOOD PRESSURE: 134 MMHG | OXYGEN SATURATION: 94 % | HEART RATE: 80 BPM | TEMPERATURE: 98.2 F

## 2023-05-08 PROCEDURE — 99212 OFFICE O/P EST SF 10 MIN: CPT

## 2023-05-08 NOTE — ASSESSMENT
[FreeTextEntry1] : Patient with persistent post-stroke left shoulder pain with negative impingement testing. Will treat with oral medications and order MRI imaging to assess etiology. A thorough discussion took place regarding treatment plan and rationale behind recommended diagnostic testing. All questions answered. Patient and family expressed understanding and agreement with plan.

## 2023-05-08 NOTE — HISTORY OF PRESENT ILLNESS
[FreeTextEntry1] : This is a 77-year-old right-handed female patient with PMH of HTN who presented to [facility] on 1/11 with expressive aphasia since 1/8. Daughter found the patient in her bedroom. She was trying to tell her daughter that she had trouble speaking. She knew what she wanted to say, but couldn't find the words to express herself. The daughter reports she had a similar episode in the past and was found to have a UTI. She was treated with antibiotics and symptoms resolved. She took her to urgent care since she thought this was a UTI and they prescribed her antibiotics. Patient did not improve with antibiotics, which prompted them to come to the ED. Patient is noncompliant with blood pressure medication. At baseline, patient occasionally requires assistance to ambulate, but refuses to use walker. She requires some assistance with ADLs. NIHSS 4, pre-mRS 2. MRI showed Left parietal acute infarct.\par \par Patient started on ASA 81mg indefinitely and Plavix for 3 months per ALIN. Patient with a stable large hematoma on her RLQ abdomen for which chemical DVT prophylaxis was not started. Duplex of the lower extremities neg.  and pt started on atorvastatin 80mg daily (titrate to LDL <70). Cardiology consulted, -160s and started on amlodipine 5mg daily. ILR placed on 1/13/23 to monitor for arrhythmias.\par \par Patient was evaluated by PM&R and therapy for functional deficits, gait/ADL impairments and acute rehabilitation was recommended. She was admitted and subsequently discharged on 1/31/23 with functional improvements. She additionally complained on left shoulder pain. She has been participating on a physical therapy program at home with good response. More recently, she was re-evaluated by Neurologist and recommended continued participation in Physical Therapy. She arrives today for evaluation with persistent left sided post-stroke shoulder pain. She notes her pain was present prior to stroke and has been progressively worsening since. She states it is worsened with physical activity but has trouble describing activities that reproduce pain.\par

## 2023-05-08 NOTE — PHYSICAL EXAM
[FreeTextEntry1] : Constitutional - NAD, Comfortable\par Extremities - No peripheral edema, No calf tenderness. + tenderness in the L shoulder and upper trapezius that is reproduced on abduction but has negative Neer/Crabtree and only mild TTP on pressure applied in modified Cras position. Good ROM overall. \par Neurologic Exam:               \par    Cognitive - \par           Orientation: Awake, Alert, AAO to self, place, date, year, situation\par           Attention:  Able to repeat. Able to recall 1/3 objects after 5 mins. Unable to do serial 7's. \par           Memory: Recent  memory intact\par           Thought: process, content appropriate\par    Speech - Expressive aphasia, Comprehensible, Mild dysarthria, able to repeat. \par    Cranial Nerves - No facial asymmetry, Tongue midline, Shoulder shrug intact\par    Motor -  \par                   LEFT    UE - ShAB 5/5, EF 5/5, EE 5/5, WE 5/5,  WNL\par                   RIGHT UE - ShAB 5/5, EF 5/5, EE 5/5, WE 5/5,  WNL\par                   LEFT    LE - HF 4/5, KE 5/5, DF 5/5, PF 5/5\par                   RIGHT LE - HF 4/5, KE 5/5, DF 5/5, PF 5/5   \par    Sensory - Intact to LT bilateral\par Psychiatric - Mood stable, Affect WNL

## 2023-05-16 ENCOUNTER — NON-APPOINTMENT (OUTPATIENT)
Age: 78
End: 2023-05-16

## 2023-05-16 ENCOUNTER — APPOINTMENT (OUTPATIENT)
Dept: INTERNAL MEDICINE | Facility: CLINIC | Age: 78
End: 2023-05-16
Payer: MEDICARE

## 2023-05-16 VITALS
WEIGHT: 148 LBS | TEMPERATURE: 97 F | OXYGEN SATURATION: 98 % | HEART RATE: 85 BPM | HEIGHT: 61 IN | DIASTOLIC BLOOD PRESSURE: 60 MMHG | SYSTOLIC BLOOD PRESSURE: 120 MMHG | BODY MASS INDEX: 27.94 KG/M2

## 2023-05-16 DIAGNOSIS — R92.2 INCONCLUSIVE MAMMOGRAM: ICD-10-CM

## 2023-05-16 DIAGNOSIS — M62.838 OTHER MUSCLE SPASM: ICD-10-CM

## 2023-05-16 PROCEDURE — 93000 ELECTROCARDIOGRAM COMPLETE: CPT | Mod: 59

## 2023-05-16 PROCEDURE — G0444 DEPRESSION SCREEN ANNUAL: CPT | Mod: 59

## 2023-05-16 PROCEDURE — 36415 COLL VENOUS BLD VENIPUNCTURE: CPT

## 2023-05-16 PROCEDURE — 99397 PER PM REEVAL EST PAT 65+ YR: CPT | Mod: 25

## 2023-05-16 RX ORDER — LIDOCAINE 5% 700 MG/1
5 PATCH TOPICAL
Qty: 30 | Refills: 1 | Status: DISCONTINUED | COMMUNITY
Start: 2023-03-20 | End: 2023-05-16

## 2023-05-16 RX ORDER — CLOPIDOGREL BISULFATE 75 MG/1
75 TABLET, FILM COATED ORAL
Qty: 90 | Refills: 3 | Status: DISCONTINUED | COMMUNITY
Start: 2023-02-27 | End: 2023-05-16

## 2023-05-16 RX ORDER — METHYLPREDNISOLONE 4 MG/1
4 TABLET ORAL DAILY
Qty: 1 | Refills: 0 | Status: DISCONTINUED | COMMUNITY
Start: 2023-05-08 | End: 2023-05-16

## 2023-05-16 NOTE — ASSESSMENT
[FreeTextEntry1] : # MUSCLE SPASM OF SHOULDER REGION - F/U WITH REHAB DOCTOR, MRI WAS ORDER\par # Hypertension-\par normotensive on meds;\par continue Norvasc 10 mg daily. Follow blood pressure\par Eat a Healthy Diet. Choose healthy meal and snack options to help you avoid high blood pressure and its complications\par Keep Yourself at a Healthy Weight\par Be Physically Active\par Get Enough Sleep \par   \par *Immunizations \par COVID -19 TOTAL 4 VACCINE   LAST DOSE 2022\par Influenza   declines\par Pneumococcal - UTD\par TDAP - UTD\par shingles vaccine UTD\par EKG done for HCM at this office today;  NSR no acute ST-T changes\par Lab ( venipuncture ) done today at this office\par Preventive medicine discussed - including importance of lifestyle modification - \par with incorporation of healthy diet,  recommended Diet low fat diet\par + regular exercise\par con't calcium 1200-1500mg and vit D 0060-2317 IU daily and regular weight bearing exercise\par for OP prevention\par weight loss counseling provided as above \par Depression screening WITH the Patient Health Questionnaire-2 (PHQ-2), THE RESULT WAS NEGATIVE.\par The benefits of adequate calcium intake and routine daily cardiovascular exercise were reviewed with the patient.\par  The patient was informed regarding the benefits of a YEARLY screening FOR SKIN CANCER \par -Recommended following up with dermatology for annual skin surveillance.\par -Recommended following up with GYN for annual surveillance.\par Recommended following up with dental, opthalmology\par  The importance of safer-sex was discussed with the patient. \par DISCUSSED PRECAUTIONS AGAINST COVID19, INCLUDING MASK WEARING, SOCIAL DISTANCING AND HAND WASHING.\par -Follow up in 1 year for CPE / annual exam or PRN.\par f/u 3 months for cva and htn\par All questions and concerns were discussed.

## 2023-05-16 NOTE — HISTORY OF PRESENT ILLNESS
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  SHE was NONCOMPLIANT WITH MEDICATIONS. AFTER CVS SHE IS \par   Amlodipine 10 mg qd

## 2023-05-16 NOTE — REVIEW OF SYSTEMS
- - - [Negative] : Heme/Lymph [Muscle Weakness] : muscle weakness [Muscle Pain] : muscle pain [FreeTextEntry9] : with persistent left sided post-stroke shoulder pain

## 2023-05-16 NOTE — COUNSELING
[Fall prevention counseling provided] : Fall prevention counseling provided [Adequate lighting] : Adequate lighting [No throw rugs] : No throw rugs [Use proper foot wear] : Use proper foot wear [Use recommended devices] : Use recommended devices [Potential consequences of obesity discussed] : Potential consequences of obesity discussed [Encouraged to maintain food diary] : Encouraged to maintain food diary [Encouraged to increase physical activity] : Encouraged to increase physical activity [Encouraged to use exercise tracking device] : Encouraged to use exercise tracking device [Target Wt Loss Goal ___] : Weight Loss Goals: Target weight loss goal [unfilled] lbs [Weigh Self Weekly] : weigh self weekly [Decrease Portions] : decrease portions [____ min/wk Activity] : [unfilled] min/wk activity [Keep Food Diary] : keep food diary [Good understanding] : Patient has a good understanding of disease, goals and obesity follow-up plan [FreeTextEntry4] : 16

## 2023-05-16 NOTE — PHYSICAL EXAM
[No Acute Distress] : no acute distress [Well Nourished] : well nourished [Well Developed] : well developed [Well-Appearing] : well-appearing [Normal Sclera/Conjunctiva] : normal sclera/conjunctiva [PERRL] : pupils equal round and reactive to light [EOMI] : extraocular movements intact [Normal Outer Ear/Nose] : the outer ears and nose were normal in appearance [Normal Oropharynx] : the oropharynx was normal [No JVD] : no jugular venous distention [No Lymphadenopathy] : no lymphadenopathy [Supple] : supple [Thyroid Normal, No Nodules] : the thyroid was normal and there were no nodules present [No Respiratory Distress] : no respiratory distress  [No Accessory Muscle Use] : no accessory muscle use [Clear to Auscultation] : lungs were clear to auscultation bilaterally [Normal Rate] : normal rate  [Regular Rhythm] : with a regular rhythm [Normal S1, S2] : normal S1 and S2 [No Murmur] : no murmur heard [No Carotid Bruits] : no carotid bruits [No Abdominal Bruit] : a ~M bruit was not heard ~T in the abdomen [No Varicosities] : no varicosities [Pedal Pulses Present] : the pedal pulses are present [No Edema] : there was no peripheral edema [No Palpable Aorta] : no palpable aorta [No Extremity Clubbing/Cyanosis] : no extremity clubbing/cyanosis [Normal Appearance] : normal in appearance [No Nipple Discharge] : no nipple discharge [No Axillary Lymphadenopathy] : no axillary lymphadenopathy [Soft] : abdomen soft [Non Tender] : non-tender [Non-distended] : non-distended [No Masses] : no abdominal mass palpated [No HSM] : no HSM [Normal Bowel Sounds] : normal bowel sounds [Normal Posterior Cervical Nodes] : no posterior cervical lymphadenopathy [Normal Anterior Cervical Nodes] : no anterior cervical lymphadenopathy [No CVA Tenderness] : no CVA  tenderness [No Spinal Tenderness] : no spinal tenderness [No Joint Swelling] : no joint swelling [No Rash] : no rash [Coordination Grossly Intact] : coordination grossly intact [No Focal Deficits] : no focal deficits [Deep Tendon Reflexes (DTR)] : deep tendon reflexes were 2+ and symmetric [Normal Affect] : the affect was normal [Normal Insight/Judgement] : insight and judgment were intact [Speech Grossly Normal] : speech grossly normal [Memory Grossly Normal] : memory grossly normal [Alert and Oriented x3] : oriented to person, place, and time [Normal Mood] : the mood was normal [de-identified] : walks with a walker

## 2023-05-17 ENCOUNTER — NON-APPOINTMENT (OUTPATIENT)
Age: 78
End: 2023-05-17

## 2023-05-17 LAB
25(OH)D3 SERPL-MCNC: 18.8 NG/ML
ALBUMIN SERPL ELPH-MCNC: 3.9 G/DL
ALP BLD-CCNC: 108 U/L
ALT SERPL-CCNC: 26 U/L
ANION GAP SERPL CALC-SCNC: 17 MMOL/L
AST SERPL-CCNC: 23 U/L
BASOPHILS # BLD AUTO: 0.03 K/UL
BASOPHILS NFR BLD AUTO: 0.3 %
BILIRUB SERPL-MCNC: 0.5 MG/DL
BUN SERPL-MCNC: 14 MG/DL
CALCIUM SERPL-MCNC: 9.3 MG/DL
CHLORIDE SERPL-SCNC: 103 MMOL/L
CHOLEST SERPL-MCNC: 117 MG/DL
CO2 SERPL-SCNC: 20 MMOL/L
CREAT SERPL-MCNC: 0.88 MG/DL
EGFR: 68 ML/MIN/1.73M2
EOSINOPHIL # BLD AUTO: 0.1 K/UL
EOSINOPHIL NFR BLD AUTO: 1.1 %
ESTIMATED AVERAGE GLUCOSE: 128 MG/DL
FERRITIN SERPL-MCNC: 95 NG/ML
GLUCOSE SERPL-MCNC: 88 MG/DL
HBA1C MFR BLD HPLC: 6.1 %
HCT VFR BLD CALC: 45.3 %
HDLC SERPL-MCNC: 43 MG/DL
HGB BLD-MCNC: 14.6 G/DL
IMM GRANULOCYTES NFR BLD AUTO: 0.3 %
LDLC SERPL CALC-MCNC: 53 MG/DL
LYMPHOCYTES # BLD AUTO: 2.62 K/UL
LYMPHOCYTES NFR BLD AUTO: 28.5 %
MAN DIFF?: NORMAL
MCHC RBC-ENTMCNC: 28.6 PG
MCHC RBC-ENTMCNC: 32.2 GM/DL
MCV RBC AUTO: 88.8 FL
MONOCYTES # BLD AUTO: 0.56 K/UL
MONOCYTES NFR BLD AUTO: 6.1 %
NEUTROPHILS # BLD AUTO: 5.84 K/UL
NEUTROPHILS NFR BLD AUTO: 63.7 %
NONHDLC SERPL-MCNC: 74 MG/DL
PLATELET # BLD AUTO: 238 K/UL
POTASSIUM SERPL-SCNC: 4.2 MMOL/L
PROT SERPL-MCNC: 7.5 G/DL
RBC # BLD: 5.1 M/UL
RBC # FLD: 13.5 %
SODIUM SERPL-SCNC: 140 MMOL/L
TRIGL SERPL-MCNC: 104 MG/DL
TSH SERPL-ACNC: 1.76 UIU/ML
VIT B12 SERPL-MCNC: 664 PG/ML
WBC # FLD AUTO: 9.18 K/UL

## 2023-07-19 ENCOUNTER — APPOINTMENT (OUTPATIENT)
Dept: MRI IMAGING | Facility: CLINIC | Age: 78
End: 2023-07-19

## 2023-08-14 ENCOUNTER — APPOINTMENT (OUTPATIENT)
Dept: INTERNAL MEDICINE | Facility: CLINIC | Age: 78
End: 2023-08-14

## 2023-09-19 ENCOUNTER — RX RENEWAL (OUTPATIENT)
Age: 78
End: 2023-09-19

## 2023-10-20 ENCOUNTER — RX RENEWAL (OUTPATIENT)
Age: 78
End: 2023-10-20

## 2023-11-06 ENCOUNTER — LABORATORY RESULT (OUTPATIENT)
Age: 78
End: 2023-11-06

## 2023-11-06 ENCOUNTER — APPOINTMENT (OUTPATIENT)
Dept: INTERNAL MEDICINE | Facility: CLINIC | Age: 78
End: 2023-11-06
Payer: MEDICARE

## 2023-11-06 VITALS
BODY MASS INDEX: 28.32 KG/M2 | DIASTOLIC BLOOD PRESSURE: 80 MMHG | TEMPERATURE: 97.8 F | SYSTOLIC BLOOD PRESSURE: 155 MMHG | OXYGEN SATURATION: 97 % | HEART RATE: 89 BPM | HEIGHT: 61 IN | WEIGHT: 150 LBS

## 2023-11-06 PROCEDURE — 99214 OFFICE O/P EST MOD 30 MIN: CPT | Mod: 25

## 2023-11-06 PROCEDURE — G0447 BEHAVIOR COUNSEL OBESITY 15M: CPT | Mod: 59

## 2023-11-06 RX ORDER — AMLODIPINE BESYLATE 10 MG/1
10 TABLET ORAL DAILY
Qty: 90 | Refills: 0 | Status: DISCONTINUED | COMMUNITY
Start: 2023-02-27 | End: 2023-11-06

## 2023-11-09 LAB
ALBUMIN SERPL ELPH-MCNC: 4.1 G/DL
ALP BLD-CCNC: 109 U/L
ALT SERPL-CCNC: 27 U/L
ANION GAP SERPL CALC-SCNC: 13 MMOL/L
APPEARANCE: CLEAR
AST SERPL-CCNC: 29 U/L
BASOPHILS # BLD AUTO: 0.02 K/UL
BASOPHILS NFR BLD AUTO: 0.3 %
BILIRUB SERPL-MCNC: 0.3 MG/DL
BILIRUBIN URINE: NEGATIVE
BLOOD URINE: NEGATIVE
BUN SERPL-MCNC: 15 MG/DL
CALCIUM SERPL-MCNC: 9.4 MG/DL
CHLORIDE SERPL-SCNC: 104 MMOL/L
CHOLEST SERPL-MCNC: 123 MG/DL
CO2 SERPL-SCNC: 23 MMOL/L
COLOR: NORMAL
CREAT SERPL-MCNC: 0.94 MG/DL
EGFR: 62 ML/MIN/1.73M2
EOSINOPHIL # BLD AUTO: 0.05 K/UL
EOSINOPHIL NFR BLD AUTO: 0.9 %
ESTIMATED AVERAGE GLUCOSE: 126 MG/DL
GLUCOSE QUALITATIVE U: NEGATIVE MG/DL
GLUCOSE SERPL-MCNC: 108 MG/DL
HBA1C MFR BLD HPLC: 6 %
HCT VFR BLD CALC: 44 %
HDLC SERPL-MCNC: 44 MG/DL
HGB BLD-MCNC: 14.4 G/DL
IMM GRANULOCYTES NFR BLD AUTO: 0.2 %
KETONES URINE: ABNORMAL MG/DL
LDLC SERPL CALC-MCNC: 61 MG/DL
LEUKOCYTE ESTERASE URINE: ABNORMAL
LYMPHOCYTES # BLD AUTO: 1.89 K/UL
LYMPHOCYTES NFR BLD AUTO: 32.5 %
MAN DIFF?: NORMAL
MCHC RBC-ENTMCNC: 28.9 PG
MCHC RBC-ENTMCNC: 32.7 GM/DL
MCV RBC AUTO: 88.2 FL
MONOCYTES # BLD AUTO: 0.39 K/UL
MONOCYTES NFR BLD AUTO: 6.7 %
NEUTROPHILS # BLD AUTO: 3.46 K/UL
NEUTROPHILS NFR BLD AUTO: 59.4 %
NITRITE URINE: NEGATIVE
NONHDLC SERPL-MCNC: 80 MG/DL
PH URINE: 5.5
PLATELET # BLD AUTO: 276 K/UL
POTASSIUM SERPL-SCNC: 4.3 MMOL/L
PROT SERPL-MCNC: 8 G/DL
PROTEIN URINE: NORMAL MG/DL
RBC # BLD: 4.99 M/UL
RBC # FLD: 13.3 %
SODIUM SERPL-SCNC: 140 MMOL/L
SPECIFIC GRAVITY URINE: 1.02
TRIGL SERPL-MCNC: 99 MG/DL
UROBILINOGEN URINE: 0.2 MG/DL
WBC # FLD AUTO: 5.82 K/UL

## 2023-11-20 ENCOUNTER — RX RENEWAL (OUTPATIENT)
Age: 78
End: 2023-11-20

## 2024-03-18 ENCOUNTER — APPOINTMENT (OUTPATIENT)
Dept: INTERNAL MEDICINE | Facility: CLINIC | Age: 79
End: 2024-03-18
Payer: MEDICARE

## 2024-03-18 ENCOUNTER — LABORATORY RESULT (OUTPATIENT)
Age: 79
End: 2024-03-18

## 2024-03-18 VITALS
WEIGHT: 153 LBS | BODY MASS INDEX: 28.89 KG/M2 | DIASTOLIC BLOOD PRESSURE: 56 MMHG | HEIGHT: 61 IN | OXYGEN SATURATION: 98 % | SYSTOLIC BLOOD PRESSURE: 132 MMHG | TEMPERATURE: 98.3 F | HEART RATE: 101 BPM

## 2024-03-18 DIAGNOSIS — I10 ESSENTIAL (PRIMARY) HYPERTENSION: ICD-10-CM

## 2024-03-18 DIAGNOSIS — R53.83 OTHER FATIGUE: ICD-10-CM

## 2024-03-18 PROCEDURE — 99214 OFFICE O/P EST MOD 30 MIN: CPT

## 2024-03-18 RX ORDER — NIFEDIPINE 90 MG/1
90 TABLET, EXTENDED RELEASE ORAL DAILY
Qty: 90 | Refills: 3 | Status: ACTIVE | COMMUNITY
Start: 2024-03-18 | End: 1900-01-01

## 2024-03-18 RX ORDER — IBUPROFEN 600 MG/1
600 TABLET, FILM COATED ORAL EVERY 6 HOURS
Qty: 56 | Refills: 0 | Status: DISCONTINUED | COMMUNITY
Start: 2023-11-06 | End: 2024-03-18

## 2024-03-18 RX ORDER — KRILL/OM-3/DHA/EPA/PHOSPHO/AST 1000-230MG
81 CAPSULE ORAL
Qty: 30 | Refills: 0 | Status: ACTIVE | COMMUNITY
Start: 2023-09-19 | End: 1900-01-01

## 2024-03-18 NOTE — HISTORY OF PRESENT ILLNESS
[FreeTextEntry1] : Patient presents for follow up evaluation for multiple medical concerns. [de-identified] : Patient presents for follow up evaluation for multiple medical concerns including: FATIGUE FOR 5 DAYS # hypertension . 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   depression with anxiety, asthma  and obesity,  dietary/weight loss consultation

## 2024-03-18 NOTE — PLAN
[FreeTextEntry1] : CVA -REF TO PT, OT speech therapy  HTN RENEWED MEDS  Hyperlipidemia-  continue ATORVASTAIN 80 mg daily.  lipid- statin- pt is compliant w meds, diet. no myalgia  I recommend trying lifestyle changes and sticking to a low cholesterol diet   DID blood work TODAY

## 2024-03-18 NOTE — PHYSICAL EXAM
[Normal TMs] : both tympanic membranes were normal [Supple] : supple [Clear to Auscultation] : lungs were clear to auscultation bilaterally [Normal S1, S2] : normal S1 and S2 [Normal] : soft, non-tender, non-distended, no masses palpated, no HSM and normal bowel sounds

## 2024-03-19 LAB
25(OH)D3 SERPL-MCNC: 26.1 NG/ML
APPEARANCE: CLEAR
BASOPHILS # BLD AUTO: 0.05 K/UL
BASOPHILS NFR BLD AUTO: 0.7 %
BILIRUBIN URINE: ABNORMAL
BLOOD URINE: NEGATIVE
COLOR: NORMAL
EOSINOPHIL # BLD AUTO: 0.16 K/UL
EOSINOPHIL NFR BLD AUTO: 2.3 %
FERRITIN SERPL-MCNC: 80 NG/ML
GLUCOSE QUALITATIVE U: NEGATIVE MG/DL
HCT VFR BLD CALC: 43.7 %
HGB BLD-MCNC: 14.2 G/DL
IMM GRANULOCYTES NFR BLD AUTO: 0.3 %
KETONES URINE: ABNORMAL MG/DL
LEUKOCYTE ESTERASE URINE: ABNORMAL
LYMPHOCYTES # BLD AUTO: 2.35 K/UL
LYMPHOCYTES NFR BLD AUTO: 33.6 %
MAN DIFF?: NORMAL
MCHC RBC-ENTMCNC: 28.6 PG
MCHC RBC-ENTMCNC: 32.5 GM/DL
MCV RBC AUTO: 88.1 FL
MONOCYTES # BLD AUTO: 0.39 K/UL
MONOCYTES NFR BLD AUTO: 5.6 %
NEUTROPHILS # BLD AUTO: 4.02 K/UL
NEUTROPHILS NFR BLD AUTO: 57.5 %
NITRITE URINE: NEGATIVE
PH URINE: 5.5
PLATELET # BLD AUTO: 311 K/UL
PROTEIN URINE: NORMAL MG/DL
RBC # BLD: 4.96 M/UL
RBC # FLD: 13.5 %
SPECIFIC GRAVITY URINE: 1.03
TSH SERPL-ACNC: 1.61 UIU/ML
UROBILINOGEN URINE: 1 MG/DL
VIT B12 SERPL-MCNC: >2000 PG/ML
WBC # FLD AUTO: 6.99 K/UL

## 2024-03-20 LAB
ALBUMIN SERPL ELPH-MCNC: 4.2 G/DL
ALP BLD-CCNC: 127 U/L
ALT SERPL-CCNC: 27 U/L
ANION GAP SERPL CALC-SCNC: 18 MMOL/L
AST SERPL-CCNC: 23 U/L
BILIRUB SERPL-MCNC: 0.2 MG/DL
BUN SERPL-MCNC: 17 MG/DL
CALCIUM SERPL-MCNC: 9.7 MG/DL
CHLORIDE SERPL-SCNC: 102 MMOL/L
CHOLEST SERPL-MCNC: 136 MG/DL
CO2 SERPL-SCNC: 21 MMOL/L
CREAT SERPL-MCNC: 1.18 MG/DL
EGFR: 47 ML/MIN/1.73M2
ESTIMATED AVERAGE GLUCOSE: 131 MG/DL
GLUCOSE SERPL-MCNC: 113 MG/DL
HBA1C MFR BLD HPLC: 6.2 %
HDLC SERPL-MCNC: 46 MG/DL
LDLC SERPL CALC-MCNC: 72 MG/DL
NONHDLC SERPL-MCNC: 90 MG/DL
POTASSIUM SERPL-SCNC: 4.7 MMOL/L
PROT SERPL-MCNC: 7.8 G/DL
SODIUM SERPL-SCNC: 141 MMOL/L
TRIGL SERPL-MCNC: 93 MG/DL

## 2024-05-08 RX ORDER — ATORVASTATIN CALCIUM 80 MG/1
80 TABLET, FILM COATED ORAL
Qty: 90 | Refills: 0 | Status: ACTIVE | COMMUNITY
Start: 2023-02-27 | End: 1900-01-01

## 2024-05-17 ENCOUNTER — LABORATORY RESULT (OUTPATIENT)
Age: 79
End: 2024-05-17

## 2024-05-17 ENCOUNTER — NON-APPOINTMENT (OUTPATIENT)
Age: 79
End: 2024-05-17

## 2024-05-17 ENCOUNTER — APPOINTMENT (OUTPATIENT)
Dept: INTERNAL MEDICINE | Facility: CLINIC | Age: 79
End: 2024-05-17
Payer: MEDICARE

## 2024-05-17 VITALS — OXYGEN SATURATION: 97 % | HEART RATE: 96 BPM | SYSTOLIC BLOOD PRESSURE: 130 MMHG | DIASTOLIC BLOOD PRESSURE: 70 MMHG

## 2024-05-17 DIAGNOSIS — E55.9 VITAMIN D DEFICIENCY, UNSPECIFIED: ICD-10-CM

## 2024-05-17 DIAGNOSIS — I63.9 CEREBRAL INFARCTION, UNSPECIFIED: ICD-10-CM

## 2024-05-17 DIAGNOSIS — Z13.820 ENCOUNTER FOR SCREENING FOR OSTEOPOROSIS: ICD-10-CM

## 2024-05-17 DIAGNOSIS — E66.3 OVERWEIGHT: ICD-10-CM

## 2024-05-17 DIAGNOSIS — E78.5 HYPERLIPIDEMIA, UNSPECIFIED: ICD-10-CM

## 2024-05-17 DIAGNOSIS — Z00.00 ENCOUNTER FOR GENERAL ADULT MEDICAL EXAMINATION W/OUT ABNORMAL FINDINGS: ICD-10-CM

## 2024-05-17 PROCEDURE — 93000 ELECTROCARDIOGRAM COMPLETE: CPT

## 2024-05-17 PROCEDURE — G0439: CPT

## 2024-05-17 RX ORDER — ERGOCALCIFEROL 1.25 MG/1
1.25 MG CAPSULE, LIQUID FILLED ORAL
Qty: 4 | Refills: 8 | Status: ACTIVE | COMMUNITY
Start: 2024-05-17 | End: 1900-01-01

## 2024-05-17 RX ORDER — NIFEDIPINE 60 MG/1
60 TABLET, EXTENDED RELEASE ORAL
Qty: 90 | Refills: 0 | Status: DISCONTINUED | COMMUNITY
Start: 2023-11-06 | End: 2024-05-17

## 2024-05-17 NOTE — HISTORY OF PRESENT ILLNESS
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  SHE was NONCOMPLIANT WITH MEDICATIONS. AFTER CVS SHE IS    Amlodipine 10 mg qd

## 2024-05-17 NOTE — REVIEW OF SYSTEMS
[Muscle Weakness] : muscle weakness [Muscle Pain] : muscle pain [Negative] : Heme/Lymph [FreeTextEntry9] : with persistent left sided post-stroke shoulder pain

## 2024-05-17 NOTE — ASSESSMENT
[FreeTextEntry1] : # MUSCLE SPASM OF SHOULDER REGION -  F/U WITH REHAB DOCTOR,  -REF TO PHYSICAL THERAPY  # CVA HAS AN APT WITH NEUROLOGY  # Forms for homecare filled.  your vitamin D level was low.  This is a very common finding. I suggest taking a vitamin D3 supplement of 1000 I.U. daily. This is available over the counter.   # Hypertension- normotensive on meds; continue NIFEDIPINE 90 mg daily. Follow blood pressure Eat a Healthy Diet. Choose healthy meal and snack options to help you avoid high blood pressure and its complications Keep Yourself at a Healthy Weight Be Physically Active Get Enough Sleep     *Immunizations  COVID -19 TOTAL 4 VACCINE   LAST DOSE 2022 Influenza   declines Pneumococcal - UTD TDAP - UTD shingles vaccine UTD EKG done for HCM at this office today;  NSR no acute ST-T changes Lab ( venipuncture ) done today at this office Preventive medicine discussed - including importance of lifestyle modification -  with incorporation of healthy diet,  recommended Diet low fat diet + regular exercise con't calcium 1200-1500mg and vit D 2867-9489 IU daily and regular weight bearing exercise for OP prevention weight loss counseling provided as above  Depression screening WITH the Patient Health Questionnaire-2 (PHQ-2), THE RESULT WAS NEGATIVE. The benefits of adequate calcium intake and routine daily cardiovascular exercise were reviewed with the patient.  The patient was informed regarding the benefits of a YEARLY screening FOR SKIN CANCER  -Recommended following up with dermatology for annual skin surveillance. -Recommended following up with GYN for annual surveillance. Recommended following up with dental, opthalmology  The importance of safer-sex was discussed with the patient.  DISCUSSED PRECAUTIONS AGAINST COVID19, INCLUDING MASK WEARING, SOCIAL DISTANCING AND HAND WASHING. -Follow up in 1 year for CPE / annual exam or PRN. f/u 3 months for cva and htn All questions and concerns were discussed.

## 2024-05-17 NOTE — PHYSICAL EXAM
[No Acute Distress] : no acute distress [Well Nourished] : well nourished [Well Developed] : well developed [Well-Appearing] : well-appearing [Normal Sclera/Conjunctiva] : normal sclera/conjunctiva [PERRL] : pupils equal round and reactive to light [EOMI] : extraocular movements intact [Normal Outer Ear/Nose] : the outer ears and nose were normal in appearance [Normal Oropharynx] : the oropharynx was normal [No JVD] : no jugular venous distention [No Lymphadenopathy] : no lymphadenopathy [Supple] : supple [Thyroid Normal, No Nodules] : the thyroid was normal and there were no nodules present [No Respiratory Distress] : no respiratory distress  [No Accessory Muscle Use] : no accessory muscle use [Clear to Auscultation] : lungs were clear to auscultation bilaterally [Normal Rate] : normal rate  [Regular Rhythm] : with a regular rhythm [Normal S1, S2] : normal S1 and S2 [No Murmur] : no murmur heard [No Carotid Bruits] : no carotid bruits [No Abdominal Bruit] : a ~M bruit was not heard ~T in the abdomen [No Varicosities] : no varicosities [Pedal Pulses Present] : the pedal pulses are present [No Edema] : there was no peripheral edema [No Palpable Aorta] : no palpable aorta [No Extremity Clubbing/Cyanosis] : no extremity clubbing/cyanosis [Normal Appearance] : normal in appearance [No Nipple Discharge] : no nipple discharge [No Axillary Lymphadenopathy] : no axillary lymphadenopathy [Soft] : abdomen soft [Non Tender] : non-tender [Non-distended] : non-distended [No Masses] : no abdominal mass palpated [No HSM] : no HSM [Normal Bowel Sounds] : normal bowel sounds [Normal Posterior Cervical Nodes] : no posterior cervical lymphadenopathy [No CVA Tenderness] : no CVA  tenderness [Normal Anterior Cervical Nodes] : no anterior cervical lymphadenopathy [No Spinal Tenderness] : no spinal tenderness [No Joint Swelling] : no joint swelling [No Rash] : no rash [Coordination Grossly Intact] : coordination grossly intact [No Focal Deficits] : no focal deficits [Deep Tendon Reflexes (DTR)] : deep tendon reflexes were 2+ and symmetric [Speech Grossly Normal] : speech grossly normal [Memory Grossly Normal] : memory grossly normal [Normal Affect] : the affect was normal [Alert and Oriented x3] : oriented to person, place, and time [Normal Mood] : the mood was normal [Normal Insight/Judgement] : insight and judgment were intact [de-identified] : walks with a walker, MUSCULAR AND MORTOR IMPAIRMENT AFTER CVA

## 2024-05-17 NOTE — HEALTH RISK ASSESSMENT
[No] : No [0] : 2) Feeling down, depressed, or hopeless: Not at all (0) [PHQ-2 Negative - No further assessment needed] : PHQ-2 Negative - No further assessment needed [I have developed a follow-up plan documented below in the note.] : I have developed a follow-up plan documented below in the note. [Never] : Never [Patient reported mammogram was normal] : Patient reported mammogram was normal [Patient reported colonoscopy was normal] : Patient reported colonoscopy was normal [None] : None [With Family] : lives with family [] :  [Fully functional (bathing, dressing, toileting, transferring, walking, feeding)] : Fully functional (bathing, dressing, toileting, transferring, walking, feeding) [With Patient/Caregiver] : , with patient/caregiver [Name: ___] : Health Care Proxy's Name: [unfilled]  [Relationship: ___] : Relationship: [unfilled] [I will adhere to the patient's wishes.] : I will adhere to the patient's wishes. [Fair] :  ~his/her~ mood as fair [[id number]] : 0 [HIV test declined] : HIV test declined [Hepatitis C test declined] : Hepatitis C test declined [Reports changes in hearing] : Reports no changes in hearing [Reports changes in vision] : Reports no changes in vision [Reports changes in dental health] : Reports no changes in dental health [MammogramDate] : 2021 [MammogramComments] : PER PT REF GIVEN [PapSmearDate] : NEVER [BoneDensityDate] : NEVER [BoneDensityComments] : REF GIVEN [ColonoscopyDate] : 2019 [ColonoscopyComments] : PER PT WNL, TO GO BACK IN 10 YRS [de-identified] : LAST EYE EXAM 2/2022 [de-identified] : NEVER [AdvancecareDate] : 05/2024 [FreeTextEntry4] : [phone]\[facility]  [phone]

## 2024-05-17 NOTE — PAST MEDICAL HISTORY
[Postmenopausal] : postmenopausal [Menopause Age____] : age at menopause was [unfilled] [Total Preg ___] : G[unfilled] [Live Births ___] : P[unfilled]  [Living ___] : Living: [unfilled]

## 2024-05-23 ENCOUNTER — NON-APPOINTMENT (OUTPATIENT)
Age: 79
End: 2024-05-23

## 2024-05-23 LAB
25(OH)D3 SERPL-MCNC: 24.2 NG/ML
ALBUMIN SERPL ELPH-MCNC: 4.2 G/DL
ALP BLD-CCNC: 120 U/L
ALT SERPL-CCNC: 26 U/L
ANION GAP SERPL CALC-SCNC: 17 MMOL/L
APPEARANCE: CLEAR
AST SERPL-CCNC: 26 U/L
BASOPHILS # BLD AUTO: 0.03 K/UL
BASOPHILS NFR BLD AUTO: 0.5 %
BILIRUB SERPL-MCNC: 0.3 MG/DL
BILIRUBIN URINE: NEGATIVE
BLOOD URINE: NEGATIVE
BUN SERPL-MCNC: 13 MG/DL
CALCIUM SERPL-MCNC: 9.7 MG/DL
CHLORIDE SERPL-SCNC: 104 MMOL/L
CHOLEST SERPL-MCNC: 115 MG/DL
CO2 SERPL-SCNC: 22 MMOL/L
COLOR: YELLOW
CREAT SERPL-MCNC: 1.07 MG/DL
EGFR: 53 ML/MIN/1.73M2
EOSINOPHIL # BLD AUTO: 0.23 K/UL
EOSINOPHIL NFR BLD AUTO: 3.7 %
ESTIMATED AVERAGE GLUCOSE: 126 MG/DL
FERRITIN SERPL-MCNC: 87 NG/ML
GLUCOSE QUALITATIVE U: NEGATIVE MG/DL
GLUCOSE SERPL-MCNC: 120 MG/DL
HBA1C MFR BLD HPLC: 6 %
HCT VFR BLD CALC: 44.5 %
HDLC SERPL-MCNC: 39 MG/DL
HGB BLD-MCNC: 14.5 G/DL
IMM GRANULOCYTES NFR BLD AUTO: 0.2 %
IRON SATN MFR SERPL: 23 %
IRON SERPL-MCNC: 66 UG/DL
KETONES URINE: NEGATIVE MG/DL
LDLC SERPL CALC-MCNC: 57 MG/DL
LEUKOCYTE ESTERASE URINE: ABNORMAL
LYMPHOCYTES # BLD AUTO: 2.05 K/UL
LYMPHOCYTES NFR BLD AUTO: 33.3 %
MAN DIFF?: NORMAL
MCHC RBC-ENTMCNC: 28.4 PG
MCHC RBC-ENTMCNC: 32.6 GM/DL
MCV RBC AUTO: 87.1 FL
MONOCYTES # BLD AUTO: 0.43 K/UL
MONOCYTES NFR BLD AUTO: 7 %
NEUTROPHILS # BLD AUTO: 3.41 K/UL
NEUTROPHILS NFR BLD AUTO: 55.3 %
NITRITE URINE: NEGATIVE
NONHDLC SERPL-MCNC: 75 MG/DL
PH URINE: 7
PLATELET # BLD AUTO: 307 K/UL
POTASSIUM SERPL-SCNC: 4.8 MMOL/L
PROT SERPL-MCNC: 8.2 G/DL
PROTEIN URINE: NEGATIVE MG/DL
RBC # BLD: 5.11 M/UL
RBC # FLD: 13.3 %
SODIUM SERPL-SCNC: 142 MMOL/L
SPECIFIC GRAVITY URINE: 1.02
TIBC SERPL-MCNC: 284 UG/DL
TRIGL SERPL-MCNC: 95 MG/DL
TSH SERPL-ACNC: 1.23 UIU/ML
UIBC SERPL-MCNC: 218 UG/DL
UROBILINOGEN URINE: 0.2 MG/DL
VIT B12 SERPL-MCNC: 651 PG/ML
WBC # FLD AUTO: 6.16 K/UL

## 2024-06-11 DIAGNOSIS — M25.512 PAIN IN LEFT SHOULDER: ICD-10-CM

## 2024-10-28 ENCOUNTER — APPOINTMENT (OUTPATIENT)
Dept: INTERNAL MEDICINE | Facility: CLINIC | Age: 79
End: 2024-10-28

## 2024-10-31 NOTE — DIETITIAN INITIAL EVALUATION ADULT - ORAL INTAKE PTA/DIET HISTORY
Patient Does Not Follow Diet @Home But Tries to "Limit Sweet Intake"  Consumes 3 Meals a Day w/ Occasionally  Usually Cooks For Self  Does Take Vitamin/Supplements @Home - Unsure of Which One @This Time
Fair

## 2025-01-23 ENCOUNTER — APPOINTMENT (OUTPATIENT)
Dept: CARE COORDINATION | Facility: HOME HEALTH | Age: 80
End: 2025-01-23
Payer: MEDICARE

## 2025-01-23 VITALS — HEIGHT: 61 IN | WEIGHT: 153 LBS | BODY MASS INDEX: 28.89 KG/M2

## 2025-01-23 DIAGNOSIS — I10 ESSENTIAL (PRIMARY) HYPERTENSION: ICD-10-CM

## 2025-01-23 DIAGNOSIS — R41.89 OTHER SYMPTOMS AND SIGNS INVOLVING COGNITIVE FUNCTIONS AND AWARENESS: ICD-10-CM

## 2025-01-23 DIAGNOSIS — G81.04: ICD-10-CM

## 2025-01-23 DIAGNOSIS — G81.00: ICD-10-CM

## 2025-01-23 DIAGNOSIS — F03.90 UNSPECIFIED DEMENTIA W/OUT BEHAVIORAL DISTURBANCE: ICD-10-CM

## 2025-01-23 DIAGNOSIS — E78.5 HYPERLIPIDEMIA, UNSPECIFIED: ICD-10-CM

## 2025-01-23 DIAGNOSIS — N18.31 CHRONIC KIDNEY DISEASE, STAGE 3A: ICD-10-CM

## 2025-01-23 PROCEDURE — 99348 HOME/RES VST EST LOW MDM 30: CPT | Mod: 2W

## 2025-01-23 RX ORDER — DONEPEZIL HYDROCHLORIDE 5 MG/1
5 TABLET ORAL
Refills: 0 | Status: ACTIVE | COMMUNITY

## 2025-02-18 NOTE — SPEECH LANGUAGE PATHOLOGY EVALUATION - SLP ATTENTION
Called pt  I informed her of her breast cancer diagnosis  I offered her an appt with Dr Goodwin on 2/25 @ 8:45am    I told her Katie will call her   
WFL
